# Patient Record
Sex: FEMALE | Race: BLACK OR AFRICAN AMERICAN | Employment: OTHER | URBAN - NONMETROPOLITAN AREA
[De-identification: names, ages, dates, MRNs, and addresses within clinical notes are randomized per-mention and may not be internally consistent; named-entity substitution may affect disease eponyms.]

---

## 2017-09-07 ENCOUNTER — HOSPITAL ENCOUNTER (OUTPATIENT)
Dept: CT IMAGING | Age: 52
Discharge: HOME | End: 2017-09-07
Payer: MEDICAID

## 2017-09-07 DIAGNOSIS — R51.9 HEADACHE, UNSPECIFIED HEADACHE TYPE: ICD-10-CM
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
## 2017-09-07 PROCEDURE — 70450 CT HEAD/BRAIN W/O DYE: CPT

## 2018-01-01 ENCOUNTER — APPOINTMENT (OUTPATIENT)
Dept: CT IMAGING | Age: 53
DRG: 040 | End: 2018-01-01
Payer: MEDICAID

## 2018-01-01 ENCOUNTER — APPOINTMENT (OUTPATIENT)
Dept: GENERAL RADIOLOGY | Age: 53
DRG: 040 | End: 2018-01-01
Payer: MEDICAID

## 2018-01-01 ENCOUNTER — HOSPITAL ENCOUNTER (INPATIENT)
Age: 53
LOS: 4 days | Discharge: NURSING, REHAB OR LONG-TERM CARE | DRG: 040 | End: 2018-01-05
Attending: EMERGENCY MEDICINE | Admitting: HOSPITALIST
Payer: MEDICAID

## 2018-01-01 DIAGNOSIS — I63.9 CEREBROVASCULAR ACCIDENT (CVA), UNSPECIFIED MECHANISM (HCC): Primary | ICD-10-CM

## 2018-01-01 PROBLEM — I10 ESSENTIAL HYPERTENSION: Chronic | Status: ACTIVE | Noted: 2018-01-01

## 2018-01-01 LAB
ALBUMIN SERPL-MCNC: 4.1 G/DL (ref 3.5–5.2)
ALP BLD-CCNC: 67 U/L (ref 35–104)
ALT SERPL-CCNC: 22 U/L (ref 5–33)
ANION GAP SERPL CALCULATED.3IONS-SCNC: 12 MMOL/L (ref 7–19)
APTT: 25.8 SEC (ref 26–36.2)
AST SERPL-CCNC: 14 U/L (ref 5–32)
BASOPHILS ABSOLUTE: 0 K/UL (ref 0–0.2)
BASOPHILS RELATIVE PERCENT: 0.6 % (ref 0–1)
BILIRUB SERPL-MCNC: <0.2 MG/DL (ref 0.2–1.2)
BUN BLDV-MCNC: 13 MG/DL (ref 6–20)
CALCIUM SERPL-MCNC: 9.3 MG/DL (ref 8.6–10)
CHLORIDE BLD-SCNC: 106 MMOL/L (ref 98–111)
CO2: 26 MMOL/L (ref 22–29)
CREAT SERPL-MCNC: 0.8 MG/DL (ref 0.5–0.9)
EOSINOPHILS ABSOLUTE: 0.3 K/UL (ref 0–0.6)
EOSINOPHILS RELATIVE PERCENT: 4 % (ref 0–5)
GFR NON-AFRICAN AMERICAN: >60
GLUCOSE BLD-MCNC: 122 MG/DL (ref 74–109)
HCT VFR BLD CALC: 41.6 % (ref 37–47)
HEMOGLOBIN: 13.6 G/DL (ref 12–16)
INR BLD: 1.03 (ref 0.88–1.18)
LYMPHOCYTES ABSOLUTE: 2 K/UL (ref 1.1–4.5)
LYMPHOCYTES RELATIVE PERCENT: 28.2 % (ref 20–40)
MCH RBC QN AUTO: 32.8 PG (ref 27–31)
MCHC RBC AUTO-ENTMCNC: 32.7 G/DL (ref 33–37)
MCV RBC AUTO: 100.2 FL (ref 81–99)
MONOCYTES ABSOLUTE: 0.6 K/UL (ref 0–0.9)
MONOCYTES RELATIVE PERCENT: 8.5 % (ref 0–10)
NEUTROPHILS ABSOLUTE: 4.1 K/UL (ref 1.5–7.5)
NEUTROPHILS RELATIVE PERCENT: 58.4 % (ref 50–65)
PDW BLD-RTO: 14.2 % (ref 11.5–14.5)
PLATELET # BLD: 239 K/UL (ref 130–400)
PMV BLD AUTO: 10.2 FL (ref 9.4–12.3)
POTASSIUM SERPL-SCNC: 3.7 MMOL/L (ref 3.5–5)
PROTHROMBIN TIME: 13.4 SEC (ref 12–14.6)
RBC # BLD: 4.15 M/UL (ref 4.2–5.4)
SODIUM BLD-SCNC: 144 MMOL/L (ref 136–145)
TOTAL PROTEIN: 7.1 G/DL (ref 6.6–8.7)
WBC # BLD: 6.9 K/UL (ref 4.8–10.8)

## 2018-01-01 PROCEDURE — 6360000002 HC RX W HCPCS: Performed by: PHYSICIAN ASSISTANT

## 2018-01-01 PROCEDURE — 99222 1ST HOSP IP/OBS MODERATE 55: CPT | Performed by: PHYSICIAN ASSISTANT

## 2018-01-01 PROCEDURE — 70450 CT HEAD/BRAIN W/O DYE: CPT

## 2018-01-01 PROCEDURE — 85025 COMPLETE CBC W/AUTO DIFF WBC: CPT

## 2018-01-01 PROCEDURE — 36415 COLL VENOUS BLD VENIPUNCTURE: CPT

## 2018-01-01 PROCEDURE — 93005 ELECTROCARDIOGRAM TRACING: CPT

## 2018-01-01 PROCEDURE — 80053 COMPREHEN METABOLIC PANEL: CPT

## 2018-01-01 PROCEDURE — 85610 PROTHROMBIN TIME: CPT

## 2018-01-01 PROCEDURE — 6370000000 HC RX 637 (ALT 250 FOR IP): Performed by: PHYSICIAN ASSISTANT

## 2018-01-01 PROCEDURE — 1210000000 HC MED SURG R&B

## 2018-01-01 PROCEDURE — 2580000003 HC RX 258: Performed by: PHYSICIAN ASSISTANT

## 2018-01-01 PROCEDURE — 99285 EMERGENCY DEPT VISIT HI MDM: CPT

## 2018-01-01 PROCEDURE — 71045 X-RAY EXAM CHEST 1 VIEW: CPT

## 2018-01-01 PROCEDURE — 99285 EMERGENCY DEPT VISIT HI MDM: CPT | Performed by: EMERGENCY MEDICINE

## 2018-01-01 PROCEDURE — 85730 THROMBOPLASTIN TIME PARTIAL: CPT

## 2018-01-01 RX ORDER — GABAPENTIN 300 MG/1
300 CAPSULE ORAL 3 TIMES DAILY
Status: DISCONTINUED | OUTPATIENT
Start: 2018-01-01 | End: 2018-01-05 | Stop reason: HOSPADM

## 2018-01-01 RX ORDER — ONDANSETRON 2 MG/ML
4 INJECTION INTRAMUSCULAR; INTRAVENOUS EVERY 6 HOURS PRN
Status: DISCONTINUED | OUTPATIENT
Start: 2018-01-01 | End: 2018-01-05 | Stop reason: HOSPADM

## 2018-01-01 RX ORDER — SODIUM CHLORIDE 0.9 % (FLUSH) 0.9 %
10 SYRINGE (ML) INJECTION PRN
Status: DISCONTINUED | OUTPATIENT
Start: 2018-01-01 | End: 2018-01-04 | Stop reason: SDUPTHER

## 2018-01-01 RX ORDER — LISINOPRIL AND HYDROCHLOROTHIAZIDE 20; 12.5 MG/1; MG/1
1 TABLET ORAL DAILY
Status: ON HOLD | COMMUNITY
End: 2018-01-29 | Stop reason: HOSPADM

## 2018-01-01 RX ORDER — FLUOXETINE HYDROCHLORIDE 20 MG/1
20 CAPSULE ORAL DAILY
Status: DISCONTINUED | OUTPATIENT
Start: 2018-01-01 | End: 2018-01-05 | Stop reason: HOSPADM

## 2018-01-01 RX ORDER — SODIUM CHLORIDE 0.9 % (FLUSH) 0.9 %
10 SYRINGE (ML) INJECTION EVERY 12 HOURS SCHEDULED
Status: DISCONTINUED | OUTPATIENT
Start: 2018-01-01 | End: 2018-01-04 | Stop reason: SDUPTHER

## 2018-01-01 RX ORDER — GABAPENTIN 300 MG/1
300 CAPSULE ORAL 3 TIMES DAILY
Status: ON HOLD | COMMUNITY
End: 2018-01-29 | Stop reason: HOSPADM

## 2018-01-01 RX ORDER — HYDROXYZINE PAMOATE 25 MG/1
25 CAPSULE ORAL 3 TIMES DAILY PRN
Status: DISCONTINUED | OUTPATIENT
Start: 2018-01-01 | End: 2018-01-05 | Stop reason: HOSPADM

## 2018-01-01 RX ORDER — HYDROXYZINE PAMOATE 25 MG/1
25 CAPSULE ORAL 3 TIMES DAILY PRN
Status: ON HOLD | COMMUNITY
End: 2019-07-05 | Stop reason: HOSPADM

## 2018-01-01 RX ORDER — FLUTICASONE PROPIONATE 50 MCG
1 SPRAY, SUSPENSION (ML) NASAL DAILY
Status: DISCONTINUED | OUTPATIENT
Start: 2018-01-01 | End: 2018-01-05 | Stop reason: HOSPADM

## 2018-01-01 RX ORDER — LEVOFLOXACIN 500 MG/1
500 TABLET, FILM COATED ORAL DAILY
COMMUNITY
End: 2018-01-01

## 2018-01-01 RX ORDER — ACETAMINOPHEN 325 MG/1
650 TABLET ORAL EVERY 4 HOURS PRN
Status: DISCONTINUED | OUTPATIENT
Start: 2018-01-01 | End: 2018-01-05 | Stop reason: HOSPADM

## 2018-01-01 RX ORDER — TERBINAFINE HYDROCHLORIDE 250 MG/1
250 TABLET ORAL DAILY
Status: ON HOLD | COMMUNITY
End: 2018-01-29 | Stop reason: HOSPADM

## 2018-01-01 RX ORDER — OLANZAPINE 2.5 MG/1
5 TABLET ORAL NIGHTLY
Status: DISCONTINUED | OUTPATIENT
Start: 2018-01-01 | End: 2018-01-05 | Stop reason: HOSPADM

## 2018-01-01 RX ADMIN — ENOXAPARIN SODIUM 40 MG: 40 INJECTION SUBCUTANEOUS at 21:47

## 2018-01-01 RX ADMIN — Medication 10 ML: at 21:48

## 2018-01-01 RX ADMIN — OLANZAPINE 5 MG: 2.5 TABLET, FILM COATED ORAL at 21:48

## 2018-01-01 RX ADMIN — FLUOXETINE HYDROCHLORIDE 20 MG: 20 CAPSULE ORAL at 21:50

## 2018-01-01 RX ADMIN — GABAPENTIN 300 MG: 300 CAPSULE ORAL at 21:48

## 2018-01-01 ASSESSMENT — ENCOUNTER SYMPTOMS
VISUAL CHANGE: 0
VOMITING: 0
SHORTNESS OF BREATH: 0

## 2018-01-01 NOTE — ED PROVIDER NOTES
images are visualized and preliminarily interpreted by the emergency physician with the below findings:        Interpretation per the Radiologist below, if available at the time of this note:    CT Head WO Contrast   Final Result   1. Right frontal lobe hypodensities are most consistent with   nonhemorrhagic subacute ischemia of the right middle cerebral artery   distribution. 2. Chronic bilateral basal ganglia and cerebellar calcifications. .    Signed by Dr Tal Lasron on 1/1/2018 4:42 PM      XR CHEST PORTABLE   Final Result   Impression:   1. Diminished level of inspiration with no acute process identified. .   Signed by Dr Tal Larson on 1/1/2018 4:33 PM            ED BEDSIDE ULTRASOUND:   Performed by ED Physician - none    LABS:  Labs Reviewed   CBC WITH AUTO DIFFERENTIAL - Abnormal; Notable for the following:        Result Value    RBC 4.15 (*)     .2 (*)     MCH 32.8 (*)     MCHC 32.7 (*)     All other components within normal limits   COMPREHENSIVE METABOLIC PANEL - Abnormal; Notable for the following:     Glucose 122 (*)     All other components within normal limits   APTT - Abnormal; Notable for the following:     aPTT 25.8 (*)     All other components within normal limits   PROTIME-INR       All other labs were within normal range or not returned as of this dictation. EMERGENCY DEPARTMENT COURSE and DIFFERENTIAL DIAGNOSIS/MDM:   Vitals:    Vitals:    01/01/18 1502 01/01/18 1613   BP: (!) 142/92 (!) 132/93   Pulse: 88 80   Resp: 18 21   SpO2: 98% 95%   Weight: 173 lb (78.5 kg)    Height: 5' 2\" (1.575 m)            MDM  Number of Diagnoses or Management Options  Diagnosis management comments: Discussed with Dr. Parul Lima - admit      CRITICAL CARE TIME   Total Critical Care time was 0 minutes, excluding separately reportable procedures. There was a high probability of clinically significant/life threatening deterioration in the patient's condition which required my urgent intervention.

## 2018-01-02 ENCOUNTER — APPOINTMENT (OUTPATIENT)
Dept: MRI IMAGING | Age: 53
DRG: 040 | End: 2018-01-02
Payer: MEDICAID

## 2018-01-02 PROBLEM — R27.0 ATAXIA: Status: ACTIVE | Noted: 2018-01-02

## 2018-01-02 PROBLEM — R20.0 NUMBNESS: Status: ACTIVE | Noted: 2018-01-02

## 2018-01-02 PROBLEM — R26.9 GAIT ABNORMALITY: Status: ACTIVE | Noted: 2018-01-02

## 2018-01-02 PROBLEM — R47.1 DYSARTHRIA: Status: ACTIVE | Noted: 2018-01-02

## 2018-01-02 LAB
ANION GAP SERPL CALCULATED.3IONS-SCNC: 13 MMOL/L (ref 7–19)
BUN BLDV-MCNC: 14 MG/DL (ref 6–20)
CALCIUM SERPL-MCNC: 9.1 MG/DL (ref 8.6–10)
CHLORIDE BLD-SCNC: 106 MMOL/L (ref 98–111)
CHOLESTEROL, TOTAL: 191 MG/DL (ref 160–199)
CO2: 25 MMOL/L (ref 22–29)
CREAT SERPL-MCNC: 0.7 MG/DL (ref 0.5–0.9)
GFR NON-AFRICAN AMERICAN: >60
GLUCOSE BLD-MCNC: 93 MG/DL (ref 74–109)
HBA1C MFR BLD: 5.9 %
HCT VFR BLD CALC: 39.7 % (ref 37–47)
HDLC SERPL-MCNC: 34 MG/DL (ref 65–121)
HEMOGLOBIN: 13.1 G/DL (ref 12–16)
LDL CHOLESTEROL CALCULATED: 129 MG/DL
LV EF: 55 %
LVEF MODALITY: NORMAL
MCH RBC QN AUTO: 32.5 PG (ref 27–31)
MCHC RBC AUTO-ENTMCNC: 33 G/DL (ref 33–37)
MCV RBC AUTO: 98.5 FL (ref 81–99)
PDW BLD-RTO: 14 % (ref 11.5–14.5)
PLATELET # BLD: 225 K/UL (ref 130–400)
PMV BLD AUTO: 9.8 FL (ref 9.4–12.3)
POTASSIUM SERPL-SCNC: 3.7 MMOL/L (ref 3.5–5)
RBC # BLD: 4.03 M/UL (ref 4.2–5.4)
SEDIMENTATION RATE, ERYTHROCYTE: 18 MM/HR (ref 0–25)
SODIUM BLD-SCNC: 144 MMOL/L (ref 136–145)
TRIGL SERPL-MCNC: 141 MG/DL (ref 0–149)
TSH SERPL DL<=0.05 MIU/L-ACNC: 1.1 UIU/ML (ref 0.27–4.2)
WBC # BLD: 6.6 K/UL (ref 4.8–10.8)

## 2018-01-02 PROCEDURE — 83090 ASSAY OF HOMOCYSTEINE: CPT

## 2018-01-02 PROCEDURE — 85300 ANTITHROMBIN III ACTIVITY: CPT

## 2018-01-02 PROCEDURE — 80048 BASIC METABOLIC PNL TOTAL CA: CPT

## 2018-01-02 PROCEDURE — 83921 ORGANIC ACID SINGLE QUANT: CPT

## 2018-01-02 PROCEDURE — 93880 EXTRACRANIAL BILAT STUDY: CPT

## 2018-01-02 PROCEDURE — 83516 IMMUNOASSAY NONANTIBODY: CPT

## 2018-01-02 PROCEDURE — A9577 INJ MULTIHANCE: HCPCS | Performed by: PSYCHIATRY & NEUROLOGY

## 2018-01-02 PROCEDURE — 99223 1ST HOSP IP/OBS HIGH 75: CPT | Performed by: PSYCHIATRY & NEUROLOGY

## 2018-01-02 PROCEDURE — 81241 F5 GENE: CPT

## 2018-01-02 PROCEDURE — 92610 EVALUATE SWALLOWING FUNCTION: CPT

## 2018-01-02 PROCEDURE — 2580000003 HC RX 258: Performed by: PHYSICIAN ASSISTANT

## 2018-01-02 PROCEDURE — 6360000002 HC RX W HCPCS: Performed by: PHYSICIAN ASSISTANT

## 2018-01-02 PROCEDURE — 86255 FLUORESCENT ANTIBODY SCREEN: CPT

## 2018-01-02 PROCEDURE — G8987 SELF CARE CURRENT STATUS: HCPCS

## 2018-01-02 PROCEDURE — 84443 ASSAY THYROID STIM HORMONE: CPT

## 2018-01-02 PROCEDURE — 70544 MR ANGIOGRAPHY HEAD W/O DYE: CPT

## 2018-01-02 PROCEDURE — 86308 HETEROPHILE ANTIBODY SCREEN: CPT

## 2018-01-02 PROCEDURE — 97162 PT EVAL MOD COMPLEX 30 MIN: CPT

## 2018-01-02 PROCEDURE — 36415 COLL VENOUS BLD VENIPUNCTURE: CPT

## 2018-01-02 PROCEDURE — G8988 SELF CARE GOAL STATUS: HCPCS

## 2018-01-02 PROCEDURE — 93306 TTE W/DOPPLER COMPLETE: CPT

## 2018-01-02 PROCEDURE — G8997 SWALLOW GOAL STATUS: HCPCS

## 2018-01-02 PROCEDURE — 80061 LIPID PANEL: CPT

## 2018-01-02 PROCEDURE — G8978 MOBILITY CURRENT STATUS: HCPCS

## 2018-01-02 PROCEDURE — 82390 ASSAY OF CERULOPLASMIN: CPT

## 2018-01-02 PROCEDURE — G8996 SWALLOW CURRENT STATUS: HCPCS

## 2018-01-02 PROCEDURE — 85027 COMPLETE CBC AUTOMATED: CPT

## 2018-01-02 PROCEDURE — 70553 MRI BRAIN STEM W/O & W/DYE: CPT

## 2018-01-02 PROCEDURE — 85306 CLOT INHIBIT PROT S FREE: CPT

## 2018-01-02 PROCEDURE — G8979 MOBILITY GOAL STATUS: HCPCS

## 2018-01-02 PROCEDURE — 99232 SBSQ HOSP IP/OBS MODERATE 35: CPT | Performed by: INTERNAL MEDICINE

## 2018-01-02 PROCEDURE — 1210000000 HC MED SURG R&B

## 2018-01-02 PROCEDURE — 6360000004 HC RX CONTRAST MEDICATION: Performed by: PSYCHIATRY & NEUROLOGY

## 2018-01-02 PROCEDURE — 6370000000 HC RX 637 (ALT 250 FOR IP): Performed by: PHYSICIAN ASSISTANT

## 2018-01-02 PROCEDURE — 85652 RBC SED RATE AUTOMATED: CPT

## 2018-01-02 PROCEDURE — 6370000000 HC RX 637 (ALT 250 FOR IP): Performed by: PSYCHIATRY & NEUROLOGY

## 2018-01-02 PROCEDURE — 83036 HEMOGLOBIN GLYCOSYLATED A1C: CPT

## 2018-01-02 PROCEDURE — 97165 OT EVAL LOW COMPLEX 30 MIN: CPT

## 2018-01-02 PROCEDURE — 85301 ANTITHROMBIN III ANTIGEN: CPT

## 2018-01-02 PROCEDURE — 85303 CLOT INHIBIT PROT C ACTIVITY: CPT

## 2018-01-02 RX ORDER — PRAVASTATIN SODIUM 20 MG
40 TABLET ORAL NIGHTLY
Status: DISCONTINUED | OUTPATIENT
Start: 2018-01-02 | End: 2018-01-05 | Stop reason: HOSPADM

## 2018-01-02 RX ORDER — ASPIRIN 81 MG/1
81 TABLET ORAL DAILY
Status: DISCONTINUED | OUTPATIENT
Start: 2018-01-02 | End: 2018-01-05 | Stop reason: HOSPADM

## 2018-01-02 RX ADMIN — GADOBENATE DIMEGLUMINE 17 ML: 529 INJECTION, SOLUTION INTRAVENOUS at 10:13

## 2018-01-02 RX ADMIN — GABAPENTIN 300 MG: 300 CAPSULE ORAL at 12:10

## 2018-01-02 RX ADMIN — FLUOXETINE HYDROCHLORIDE 20 MG: 20 CAPSULE ORAL at 12:09

## 2018-01-02 RX ADMIN — ENOXAPARIN SODIUM 40 MG: 40 INJECTION SUBCUTANEOUS at 20:50

## 2018-01-02 RX ADMIN — Medication 81 MG: at 12:10

## 2018-01-02 RX ADMIN — Medication 10 ML: at 12:15

## 2018-01-02 RX ADMIN — GABAPENTIN 300 MG: 300 CAPSULE ORAL at 20:53

## 2018-01-02 RX ADMIN — Medication 10 ML: at 20:53

## 2018-01-02 RX ADMIN — FLUTICASONE PROPIONATE 1 SPRAY: 50 SPRAY, METERED NASAL at 12:10

## 2018-01-02 RX ADMIN — PRAVASTATIN SODIUM 40 MG: 20 TABLET ORAL at 20:50

## 2018-01-02 RX ADMIN — OLANZAPINE 5 MG: 2.5 TABLET, FILM COATED ORAL at 20:50

## 2018-01-02 NOTE — PROGRESS NOTES
Speech Language Pathology  Facility/Department: [address] EVALUATION    NAME: Krystin Patricia  : 1965  MRN: [id number]    Date of Eval: 2018  Evaluating Therapist: Ambar Power    Current Diet level:  Current Liquid Diet : NPO    Reason for Referral  Krystin Patricia was referred for a bedside swallow evaluation to assess the efficiency of her swallow function, identify signs and symptoms of aspiration and make recommendations regarding safe dietary consistencies, effective compensatory strategies, and safe eating environment. Impression  Assessed patient's swallowing function. Patient exhibits decreased oral prep and transit of more solid consistencies, fast oral transit and suspected swallows delay with even thickened liquids (S/S penetration/aspiration were noted with nectar thick H2O trials with inconsistent changes in vocal quality and inconsistent delayed coughs observed), and very sluggish, mild-moderately decreased laryngeal elevation for swallow airway protection. At this time, would recommend mechanical soft consistency and honey thick liquids. Meds crushed in puree. Assist during meals. CHECK FOR POCKETING ON THE LEFT. Okay for ice chips IN BETWEEN MEALS for comfort. Treatment Plan  Requires SLP Intervention: Yes    Recommended Diet and Intervention  Diet Solids Recommendation: Dysphagia II Mechanically Altered  Liquid Consistency Recommendation: Honey  Recommended Form of Meds: Crushed in puree as able  Therapeutic Interventions: Patient/Family education, Diet tolerance monitoring, Oral motor exercises, Pharyngeal exercises, Therapeutic PO trials with SLP    Compensatory Swallowing Strategies  Compensatory Swallowing Strategies: Upright as possible for all oral intake;Assist feed;Small bites/sips;Eat/Feed slowly; Alternate solids and liquids; Check for pocketing of food on the Left; External pacing;Remain upright for 30-45 minutes after noted.)    Assessed patient's swallowing function with the following observation noted:    Oral Phase Dysfunction  Oral Phase: Exceptions  Oral Phase Dysfunction  Impaired Mastication: Ice chips;Mechanical soft (Patient exhibited slow oral prep with decreased vertical and rotary jaw movement noted, particularly on the left side of the mouth, during 1/2 teaspoon ice chip trials and mechanical soft consistency trials (with added gravy texture) presented by SLP.)  Decreased Anterior to Posterior Transit: Mechanical soft  Suspected Premature Bolus Loss: Ice chips;Honey - cup;Nectar - cup (Patient exhibited fast oral transit of masticated ice chip trials. Patient exhibited inconsistently fast oral transit of honey thick H2O trials presented independently via cup. Patient exhibited consistently fast oral transit of nectar thick H2O trials presented independently via cup.)  Lingual/Palatal Residue: Mechanical soft (Min-moderate oral cavity residue was noted, particularly in the left sulci, post swallows that cleared with lingual sweep and additional swallows.)  Oral Phase - Comment: Patient exhibited functional oral prep and transit of puree consistency trials, presented by SLP, with timing of oral transit primarily measuring 1-2 seconds in length and with no oral cavity residue noted post swallows.       Indicators of Pharyngeal Phase Dysfunction  Pharyngeal Phase: Exceptions  Indicators of Pharyngeal Phase Dysfunction  Delayed Swallow: Ice chips;Honey - cup;Nectar - cup (Suspect secondary to fast oral transit times.)  Decreased Laryngeal Elevation: All (Laryngeal elevation was considered to be very sluggish and mild-moderately decreased for swallow airway protection.)  Wet Vocal Quality: Nectar - cup (Patient exhibited inconsistent wet vocal quality and inconsistent delayed coughs following nectar thick H2O trials.)  Cough - Delayed: Nectar - cup (Patient exhibited inconsistent, delayed coughs following nectar thick H2O

## 2018-01-02 NOTE — PROGRESS NOTES
Patient alert and oriented x 3, up x 2 to bsc to void, left facial droop noted with left side weakness and right side numbness, will cont to monitor thru night  at bedside   Electronically signed by Veronica Pandey RN on 1/1/2018 at 11:11 PM

## 2018-01-02 NOTE — PROGRESS NOTES
Progress Note  1/2/2018 5:17 PM  Subjective:   Admit Date: 1/1/2018  PCP: JOHN Cordova      Subjective: pt seen and examined. States she is feeling better. Denies Kline, dizziness, n/v. No chest pain or sob. Still some L sided weakness but says its better. ROS: 14 point review of systems is negative except as specifically addressed above.     DIET DYSPHAGIA II MECHANICALLY ALTERED; Honey Thick    Intake/Output Summary (Last 24 hours) at 01/02/18 1717  Last data filed at 01/02/18 1100   Gross per 24 hour   Intake              150 ml   Output              300 ml   Net             -150 ml     Medications:   Current Facility-Administered Medications   Medication Dose Route Frequency Provider Last Rate Last Dose    aspirin EC tablet 81 mg  81 mg Oral Daily Kathrine Dong MD   81 mg at 01/02/18 1210    pravastatin (PRAVACHOL) tablet 40 mg  40 mg Oral Nightly Kathrine Dong MD        FLUoxetine (PROZAC) capsule 20 mg  20 mg Oral Daily Jigna Toro PA-C   20 mg at 01/02/18 1209    fluticasone (FLONASE) 50 MCG/ACT nasal spray 1 spray  1 spray Nasal Daily Jigna Toro PA-C   1 spray at 01/02/18 1210    gabapentin (NEURONTIN) capsule 300 mg  300 mg Oral TID Jigna Toro PA-C   300 mg at 01/02/18 1210    hydrOXYzine (VISTARIL) capsule 25 mg  25 mg Oral TID PRN Jigna Toro PA-C        OLANZapine (ZYPREXA) tablet 5 mg  5 mg Oral Nightly Jigna Toro PA-C   5 mg at 01/01/18 2148    sodium chloride flush 0.9 % injection 10 mL  10 mL Intravenous 2 times per day Jigna Toro PA-C   10 mL at 01/02/18 1215    sodium chloride flush 0.9 % injection 10 mL  10 mL Intravenous PRN Jigna Toro PA-C        acetaminophen (TYLENOL) tablet 650 mg  650 mg Oral Q4H PRN Jigna Toro PA-C        magnesium hydroxide (MILK OF MAGNESIA) 400 MG/5ML suspension 30 mL  30 mL Oral Daily PRN KEMAR Kim-KIM        [facility]etron [facility]) injection 4 mg  4 mg Intravenous Q6H PRN Jigna Toro PA-C  enoxaparin (LOVENOX) injection 40 mg  40 mg Subcutaneous Nightly Estee Peterson PA-C   40 mg at 01/01/18 2147    influenza quadrivalent split vaccine (FLUZONE;FLUARIX;FLULAVAL;AFLURIA) injection 0.5 mL  0.5 mL Intramuscular Once Jayla Bell MD            Labs:     Recent Labs      01/01/18   1524  01/02/18   0225   WBC  6.9  6.6   RBC  4.15*  4.03*   HGB  13.6  13.1   HCT  41.6  39.7   MCV  100.2*  98.5   MCH  32.8*  32.5*   MCHC  32.7*  33.0   PLT  239  225     Recent Labs      01/01/18   1524  01/02/18   0225   NA  144  144   K  3.7  3.7   ANIONGAP  12  13   CL  106  106   CO2  26  25   BUN  13  14   CREATININE  0.8  0.7   GLUCOSE  122*  93   CALCIUM  9.3  9.1     No results for input(s): MG, PHOS in the last 72 hours. Recent Labs      01/01/18   1524   AST  14   ALT  22   BILITOT  <0.2   ALKPHOS  67     ABGs:No results for input(s): PHART, LGN8XLQ, PO2ART, AGX8VZR, BEART, HGBAE, F6AFRFWJ, CARBOXHGBART, 02THERAPY in the last 72 hours. HgBA1c:   Recent Labs      01/02/18 0225   LABA1C  5.9     FLP:  Lab Results   Component Value Date    TRIG 141 01/02/2018    HDL 34 01/02/2018    LDLCALC 129 01/02/2018     TSH:    Lab Results   Component Value Date    TSH 1.100 01/02/2018     Troponin T: No results for input(s): TROPONINI in the last 72 hours.   INR:   Recent Labs      01/01/18   1524   INR  1.03       Objective:   Vitals: /78   Pulse 85   Temp 99.1 °F (37.3 °C) (Temporal)   Resp 16   Ht 5' 2\" (1.575 m)   Wt 179 lb (81.2 kg)   SpO2 94%   BMI 32.74 kg/m²   24HR INTAKE/OUTPUT:    Intake/Output Summary (Last 24 hours) at 01/02/18 1717  Last data filed at 01/02/18 1100   Gross per 24 hour   Intake              150 ml   Output              300 ml   Net             -150 ml     General appearance: NAD, alert and cooperative with exam  HEENT: atraumatic, PERRLA, EOMI, anicteric, trachea midline  Lungs: no increased work of breathing, CTAB, no wheezing/rhonchi/rales  Heart: RRR, +s1/s2, no

## 2018-01-02 NOTE — PROGRESS NOTES
Physical Therapy    Facility/Department: [facility] SURG SERVICES  Initial Assessment    NAME: Lisa Del Castillo  : 1965  MRN: [id number]    Date of Service: 2018    Patient Diagnosis(es): The encounter diagnosis was Cerebrovascular accident (CVA), unspecified mechanism ([facility] [address] has a past medical history of Anxiety; Depression; and Hypertension. has a past surgical history that includes  section; Hand surgery; Cholecystectomy; and Cosmetic surgery. Restrictions     Vision/Hearing  Vision:  (Pt had no c/o)  Hearing: Within functional limits     Subjective  General  Family / Caregiver Present: Yes  Diagnosis: CVA  Follows Commands: Within Functional Limits  Subjective  Subjective: Pt'S [address] [facility] [place] [id number] [facility] ASSIST FOR ALL MOBILITY  Pain Screening  Patient Currently in Pain: Yes          Orientation  Orientation  Overall Orientation Status: Within Functional Limits (thought it was december but states year correctly)    Social/Functional History  Social/Functional History  Lives With: Spouse  Home Equipment: Rolling walker  ADL Assistance: Needs assistance  Ambulation Assistance: Needs assistance  Transfer Assistance: Needs assistance  Additional Comments: requires assist with all adl's and mobility in the last month.  chart notes pt is homeless  Objective     Observation/Palpation  Posture: Fair    AROM RLE (degrees)  RLE AROM: WFL  AROM LLE (degrees)  LLE AROM : WFL  Strength RLE  Comment: hip 3/5, knee 4/5, ankle 4/5  Strength LLE  Comment: hip 2/5, knee 3/5, ankle -3/5  Motor Control  Gross Motor?:  (dec doordination L LE)  Sensation  Overall Sensation Status: WFL  Bed mobility  Supine to Sit: Contact guard assistance  Comment: cga to become upright but then required assist due to LOB once sitting  Transfers  Sit to Stand: Contact guard assistance  Stand to sit: Contact guard assistance  Bed to Chair: Minimal assistance (1+1)  Ambulation  Ambulation?: 40 percent impaired, limited or restricted  OutComes Score                                           AM-PAC Score             Goals  Short term goals  Time Frame for Short term goals: 14 days before re eval  Short term goal 1: SUP<>SIT SBA  Short term goal 2: SIT<>STAND SBA  Short term goal 3: AMB 25 FT WITH RW AND SBA       Therapy Time   Individual Concurrent Group Co-treatment   Time In           Time Out           Minutes                   Ravindra Miles PT    Electronically signed by Ravindra Miles PT on 1/2/2018 at 11:13 AM

## 2018-01-02 NOTE — CARE COORDINATION
The [address] at [facility]  Notification of Admission Decision      [] Patient has been accepted for admit to [facility] on :       Please write discharge orders and summary prior to discharge. [] Patient acceptance to Rehab pending the following :    [] Eval in progress       [x] Patient determined to be ineligible for services at [facility] because :  Feel patient will need a longer stay than Rehab to be able to return to [facility]. We recommend you consider: SNF        Thank you for your referral, we appreciate you.     Electronically Signed by Robert Ybarra Admissions Coordinator 1/2/2018 1:31 PM

## 2018-01-02 NOTE — CONSULTS
Inpatient consult to Neurology  Consult performed by: Eduard Florian ordered by: Maribell Estrada Neurology Consult        Patient:   Jose J Christianson  MR#:    [id number]  Account Number:                   [id number]      Room:    [address] [place] [address]   YOB: 1965  Date of Progress Note: 1/2/2018  Time of Note                           7:23 AM  Attending Physician:  Ruben Link MD  Consulting Physician:   Sabra Hall M.D.        [address] [place] [address] Avenue:  Left facial droop/right sided numbness/dysarthria/ataxia       HISTORY OF PRESENT ILLNESS:   This 50 y.o. female who presents with left-sided weakness, right-sided numbness, imbalance, dizziness, dysarthria that started about a week ago. Patient denies any previous stroke but she was a smoker. Currently she is homeless with her . Approximately a week ago she noted left-sided weakness and facial drooping. She had some right-sided numbness. A few weeks prior to admission she lost control of the right side. She indicates her Balance is affected. She indicates that she has had issues with walking over the last 1 year. REVIEW OF SYSTEMS:  Constitutional - No fever or chills. No diaphoresis or significant fatigue. HENT -  No tinnitus or significant hearing loss. Eyes - no sudden vision change or eye pain  Respiratory - no significant shortness of breath or cough  Cardiovascular - no chest pain No palpitations or significant leg swelling  Gastrointestinal - no abdominal swelling or pain. Genitourinary - No difficulty urinating, dysuria  Musculoskeletal - no back pain or myalgia. Skin - no color change or rash  Neurologic - No seizures. No lateralizing weakness. Hematologic - no easy bruising or excessive bleeding. Psychiatric - no severe anxiety or nervousness. All other review of systems are negative.       Past Medical History:      Diagnosis Date    Anxiety     Depression     Hypertension        Past Surgical History:      Procedure Laterality Date     SECTION      x2    CHOLECYSTECTOMY      COSMETIC SURGERY      HAND SURGERY         Medications in Hospital:      Current Facility-Administered Medications:     FLUoxetine (PROZAC) capsule 20 mg, 20 mg, Oral, Daily, Cornel Lord PA-C, 20 mg at 18    fluticasone (FLONASE) 50 MCG/ACT nasal spray 1 spray, 1 spray, Nasal, Daily, Cornel Lord PA-C, Stopped at 18    gabapentin (NEURONTIN) capsule 300 mg, 300 mg, Oral, TID, Cornel Lord PA-C, 300 mg at 18    hydrOXYzine (VISTARIL) capsule 25 mg, 25 mg, Oral, TID PRN, Cornel Lord PA-C    OLANZapine [facility]) tablet 5 mg, 5 mg, Oral, Nightly, Cornel Lord PA-C, 5 mg at 18    sodium chloride flush 0.9 % injection 10 mL, 10 mL, Intravenous, 2 times per day, Cornel Lord PA-C, 10 mL at 18    sodium chloride flush 0.9 % injection 10 mL, 10 mL, Intravenous, PRN, Cornel Lrod PA-C    acetaminophen (TYLENOL) tablet 650 mg, 650 mg, Oral, Q4H PRN, Cornel Lord PA-C    magnesium hydroxide (MILK OF MAGNESIA) 400 MG/5ML suspension 30 mL, 30 mL, Oral, Daily PRN, Cornel Lord PA-C    ondansetron [facility]) injection 4 mg, 4 mg, Intravenous, Q6H PRN, Cornel Lord PA-C    enoxaparin (LOVENOX) injection 40 mg, 40 mg, Subcutaneous, Nightly, Cornel Lord PA-C, 40 mg at 18    influenza quadrivalent split vaccine (FLUZONE;FLUARIX;FLULAVAL;AFLURIA) injection 0.5 mL, 0.5 mL, Intramuscular, Once, Maximus Crespo MD    Allergies:  Penicillins    Social History:   TOBACCO:   reports that she has been smoking. She has been smoking about 0.50 packs per day. She has never used smokeless tobacco.  ETOH:   reports that she drinks alcohol.     Family History:       Problem Relation Age of Onset    Mental Illness Mother     Diabetes Mother     Cancer Mother     Mental Illness Father            Physical Exam:    Vitals: /74 Pulse 79   Temp 98.4 °F (36.9 °C) (Temporal)   Resp 14   Ht 5' 2\" (1.575 m)   Wt 179 lb (81.2 kg)   SpO2 92%   BMI 32.74 kg/m²     Constitutional - well developed, well nourished. Eyes - conjunctiva normal.  Pupils react to light  Ear, nose, throat -hearing intact to finger rub No scars, masses, or lesions over external nose or ears, no atrophy of tongue  Neck-symmetric, no masses noted, no jugular vein distension  Respiration- chest wall appears symmetric, good expansion,   normal effort without use of accessory muscles  Musculoskeletal - no significant wasting of muscles noted, no bony deformities  Extremities-no clubbing, cyanosis or edema  Skin - warm, dry, and intact. No rash, erythema, or pallor.   Psychiatric - mood, affect, and behavior appear normal.      Neurological exam  Awake, alert, fluent oriented x 3 appropriate affect  Attention and concentration appear appropriate  Recent and remote memory appears unremarkable  Speech with dysarthria  No clear issues with language of fund of knowledge    Cranial Nerve Exam   CN II- Visual fields grossly unremarkable  CN III, IV,VI-EOMI, No nystagmus, conjugate eye movements, no ptosis  CN V-sensation reduced over the right  CN VII-left upper and lower facial droop  CN VIII-Hearing intact to finger rub  CN IX and X- Palate elevates in midline  CN XI-good shoulder shrug  CN XII-Tongue midline with no fasciculations or fibrillations    Motor Exam  Giveaway on the right upper extremity  no cogwheeling, normal tone    Sensory Exam  Sensation reduced over the right    Reflexes   2+ biceps bilaterally  2+ brachioradialis  2+patella  2+ ankle jerks  No clonus ankles  No Gordon's sign bilateral hands    Tremors- no tremors in hands or head noted    Gait  Not tested    Coordination  Finger to nose-ataxia worse on the left        CBC:   Recent Labs      01/01/18   1524  01/02/18   0225   WBC  6.9  6.6   HGB  13.6  13.1   PLT  239  225       BMP:    Recent Labs 01/01/18   1524  01/02/18   0225   NA  144  144   K  3.7  3.7   CL  106  106   CO2  26  25   BUN  13  14   CREATININE  0.8  0.7   GLUCOSE  122*  93       Hepatic:   Recent Labs      01/01/18   1524   AST  14   ALT  22   BILITOT  <0.2   ALKPHOS  67       Lipids:   Recent Labs      01/02/18   0225   CHOL  191   HDL  34*       INR:   Recent Labs      01/01/18   1524   INR  1.03           Assessment and Plan     Left-sided weakness/right-sided numbness/ataxia/left upper and lower facial droop/dysarthria  Suspect a brainstem stroke (tiffany) with crossed findings-Demyelination is certainly in the differential  Check MRI of the brain with without contrast/MRA of the head/carotid/2-D echo  Placed on aspirin for stroke prophylaxis   -start statin  hemoglobin A1c 5.9  Smoking cessation  DVT prophylaxis-Lovenox  Mood disorder on medications  PT/OT/speech  rehab consult       Please feel free to call with any questions   [phone] (cell phone)    Dr Keely Pham certified in Neurology  Board Certified in Clinical Neurophysiology  Fellowship Trained in [place] [address] Neurophysiology

## 2018-01-02 NOTE — H&P
Yes Historical Provider, MD     Allergies:    Penicillins    Social History:    The patient currently lives at home. Tobacco:   reports that she has been smoking. She has been smoking about 0.50 packs per day. She has never used smokeless tobacco.  Alcohol:   reports that she drinks alcohol. Illicit Drugs: denies    Family History:      Problem Relation Age of Onset    Mental Illness Mother     Diabetes Mother     Cancer Mother     Mental Illness Father      Review of Systems:   Constitutional / general:  Denies fever / chills / sweats  Head: + headache / Denies neck stiffness / trauma / visual change  Eyes:  Denies blurry vision / acute visual change or loss / itching / redness  ENT: Denies sore throat / hoarseness / nasal drainage / ear pain  CV:  Denies chest pain / palpitations/ orthopnea   Respiratory:  Denies cough / shortness of breath / sputum / hemoptysis  GI: Denies nausea / vomiting / abdominal pain / diarrhea / constipation  :  Denies dysuria / hesitancy / urgency / hematuria   Neuro: Denies paralysis / syncope / seizure / dysphagia /+ headache /+ paresthesias/ +unilateral weakness  Musculoskeletal:  + muscle weakness /Denies joint stiffness / pain  Vascular: Denies edema / claudication / varicosities  Heme / endocrine: Denies easy bruising / bleeding / excessive sweating / heat or cold intolerance  Psychiatric:  + depression /Denies anxiety / insomnia / mood changes  Skin:  Denies new rashes / lesions / skin hair or nail changes    14 point review of systems is negative except as specifically addressed above. Physical Examination:  /75   Pulse 87   Temp 98 °F (36.7 °C)   Resp 15   Ht 5' 2\" (1.575 m)   Wt 173 lb (78.5 kg)   SpO2 99%   BMI 31.64 kg/m²   General appearance: alert, appears stated age, cooperative and no distress, resting comfortably in ER stretcher  Head: Normocephalic, without obvious abnormality, atraumatic  Eyes: conjunctivae/corneas clear. PERRL, EOM's intact.

## 2018-01-03 ENCOUNTER — APPOINTMENT (OUTPATIENT)
Dept: CT IMAGING | Age: 53
DRG: 040 | End: 2018-01-03
Payer: MEDICAID

## 2018-01-03 DIAGNOSIS — I63.9 CEREBROVASCULAR ACCIDENT (CVA) DETERMINED BY CLINICAL ASSESSMENT (HCC): Primary | ICD-10-CM

## 2018-01-03 DIAGNOSIS — R90.89 ABNORMAL FINDING ON MRI OF BRAIN: ICD-10-CM

## 2018-01-03 PROCEDURE — 99233 SBSQ HOSP IP/OBS HIGH 50: CPT | Performed by: INTERNAL MEDICINE

## 2018-01-03 PROCEDURE — 97530 THERAPEUTIC ACTIVITIES: CPT

## 2018-01-03 PROCEDURE — 99233 SBSQ HOSP IP/OBS HIGH 50: CPT | Performed by: PSYCHIATRY & NEUROLOGY

## 2018-01-03 PROCEDURE — 71260 CT THORAX DX C+: CPT

## 2018-01-03 PROCEDURE — 2580000003 HC RX 258: Performed by: PHYSICIAN ASSISTANT

## 2018-01-03 PROCEDURE — 1210000000 HC MED SURG R&B

## 2018-01-03 PROCEDURE — 99253 IP/OBS CNSLTJ NEW/EST LOW 45: CPT | Performed by: NEUROLOGICAL SURGERY

## 2018-01-03 PROCEDURE — 97116 GAIT TRAINING THERAPY: CPT

## 2018-01-03 PROCEDURE — 6370000000 HC RX 637 (ALT 250 FOR IP): Performed by: PHYSICIAN ASSISTANT

## 2018-01-03 PROCEDURE — 74177 CT ABD & PELVIS W/CONTRAST: CPT

## 2018-01-03 PROCEDURE — 6360000002 HC RX W HCPCS: Performed by: PHYSICIAN ASSISTANT

## 2018-01-03 PROCEDURE — 6360000004 HC RX CONTRAST MEDICATION: Performed by: HOSPITALIST

## 2018-01-03 PROCEDURE — 6370000000 HC RX 637 (ALT 250 FOR IP): Performed by: PSYCHIATRY & NEUROLOGY

## 2018-01-03 PROCEDURE — 92526 ORAL FUNCTION THERAPY: CPT

## 2018-01-03 PROCEDURE — 99255 IP/OBS CONSLTJ NEW/EST HI 80: CPT | Performed by: INTERNAL MEDICINE

## 2018-01-03 RX ADMIN — ENOXAPARIN SODIUM 40 MG: 40 INJECTION SUBCUTANEOUS at 20:58

## 2018-01-03 RX ADMIN — Medication 10 ML: at 20:59

## 2018-01-03 RX ADMIN — GABAPENTIN 300 MG: 300 CAPSULE ORAL at 18:44

## 2018-01-03 RX ADMIN — Medication 81 MG: at 18:44

## 2018-01-03 RX ADMIN — FLUOXETINE HYDROCHLORIDE 20 MG: 20 CAPSULE ORAL at 18:44

## 2018-01-03 RX ADMIN — PRAVASTATIN SODIUM 40 MG: 20 TABLET ORAL at 20:58

## 2018-01-03 RX ADMIN — FLUTICASONE PROPIONATE 1 SPRAY: 50 SPRAY, METERED NASAL at 18:27

## 2018-01-03 RX ADMIN — OLANZAPINE 5 MG: 2.5 TABLET, FILM COATED ORAL at 20:58

## 2018-01-03 RX ADMIN — GABAPENTIN 300 MG: 300 CAPSULE ORAL at 20:58

## 2018-01-03 RX ADMIN — IOPAMIDOL 90 ML: 755 INJECTION, SOLUTION INTRAVENOUS at 13:37

## 2018-01-03 NOTE — PROGRESS NOTES
Physical Therapy  Name: Jose J Christianson  MRN:  [id number]  Date of service:  1/3/2018       01/03/18 1693   Subjective   Subjective Patient agreeable to work with therapy. Pain Screening   Patient Currently in Pain Denies   Bed Mobility   Supine to Sit Stand by assistance;Contact guard assistance   Scooting Stand by assistance;Contact guard assistance   Transfers   Sit to Stand Contact guard assistance   Stand to sit Contact guard assistance   Ambulation   Ambulation? Yes   Ambulation 1   Surface level tile   Device Standard Walker   Assistance Moderate assistance  (1+1)   Quality of Gait unsteady, assist with advancing walker, consistent cues required for technique and sequencing, assist with lateral weight shifts with decreased foot clearance noted on left LE   Distance 20'   Comments increased gait distance   Short term goals   Time Frame for Short term goals 14 days before re eval   Short term goal 1 SUP<>SIT SBA   Short term goal 2 SIT<>STAND SBA   Short term goal 3 AMB 25 FT WITH RW AND SBA   Conditions Requiring Skilled Therapeutic Intervention   Body structures, Functions, Activity limitations Decreased functional mobility ; Decreased strength;Decreased safe awareness;Decreased balance;Decreased coordination   Assessment Patient did better today. Assisted patient to the recliner and left with all needs in reach.    Treatment Diagnosis CVA   Prognosis Good       Electronically signed by Faisal Wheatley PTA on 1/3/2018 at 4:23 PM

## 2018-01-03 NOTE — CONSULTS
PA-C, 5 mg at 01/02/18 2050    sodium chloride flush 0.9 % injection 10 mL, 10 mL, Intravenous, 2 times per day, Susan Pontiff, PA-C, 10 mL at 01/02/18 2053    sodium chloride flush 0.9 % injection 10 mL, 10 mL, Intravenous, PRN, Susan Pontiff, PA-C    acetaminophen (TYLENOL) tablet 650 mg, 650 mg, Oral, Q4H PRN, Susan Pontiff, PA-C    magnesium hydroxide (MILK OF MAGNESIA) 400 MG/5ML suspension 30 mL, 30 mL, Oral, Daily PRN, Susan Pontiff, PA-C    [facility]ansetron [facility]) injection 4 mg, 4 mg, Intravenous, Q6H PRN, Susan Pontiff, PA-C    enoxaparin (LOVENOX) injection 40 mg, 40 mg, Subcutaneous, Nightly, Susan Pontiff, PA-C, 40 mg at 01/02/18 2050    influenza quadrivalent split vaccine (FLUZONE;FLUARIX;FLULAVAL;AFLURIA) injection 0.5 mL, 0.5 mL, Intramuscular, Once, Elly Montero MD  Penicillins    Social History  History   Smoking Status    Current Every Day Smoker    Packs/day: 0.50   Smokeless Tobacco    Never Used     History   Alcohol Use    Yes     Comment: Occassionally         Family History   Problem Relation Age of Onset    Mental Illness Mother     Diabetes Mother     Cancer Mother     Mental Illness Father          REVIEW OF SYSTEMS:  Constitutional: No fevers No chills  Neck:No stiffness  Respiratory: No shortness of breath  Cardiovascular: No chest pain No palpitations  Gastrointestinal: No abdominal pain    Genitourinary: No Dysuria  Neurological: No headache, no confusion    PHYSICAL EXAM:  Vitals:    01/03/18 1432   BP: 114/76   Pulse: 84   Resp: 18   Temp: 97.4 °F (36.3 °C)   SpO2: 96%     Constitutional: The patient appears well-developed and well-nourished.    Eyes - conjunctiva normal.  Conjugate Gaze  Ear, nose, throat - No scars, masses, or lesions over external nose or ears, no atrophy of tongue  Neck-symmetric, no masses noted, no jugular vein distension  Respiration- chest wall appears symmetric, good expansion, normal effort without use of accessory muscles  Musculoskeletal - no significant wasting of muscles noted, no bony deformities, gait no gross ataxia  Extremities-no clubbing, cyanosis or edema  Skin - warm, dry, and intact. No rash, erythema, or pallor. Psychiatric - mood, affect, and behavior appear normal.     Neurologic Examination  Awake, Alert and oriented x 3  Left sided facial droop  Pronator drift on left  Normal speech pattern, following commands  Identifies objects appropriately   Motor 4-/5 entire LUE, subtle weakness in LLE   No deficits to light touch or pinprick sensation  Reflexes 2+ and symmetric      DATA:  Nursing/pcp notes, imaging,labs and vitals reviewed. PT,OT and/or speech notes reviewed    Lab Results   Component Value Date    WBC 6.6 01/02/2018    HGB 13.1 01/02/2018    HCT 39.7 01/02/2018    MCV 98.5 01/02/2018     01/02/2018     Lab Results   Component Value Date     01/02/2018    K 3.7 01/02/2018     01/02/2018    CO2 25 01/02/2018    BUN 14 01/02/2018    CREATININE 0.7 01/02/2018    GLUCOSE 93 01/02/2018    CALCIUM 9.1 01/02/2018    PROT 7.1 01/01/2018    LABALBU 4.1 01/01/2018    BILITOT <0.2 01/01/2018    ALKPHOS 67 01/01/2018    AST 14 01/01/2018    ALT 22 01/01/2018    LABGLOM >60 01/02/2018    GLOB 3.0 11/12/2016     Lab Results   Component Value Date    INR 1.03 01/01/2018    PROTIME 13.4 01/01/2018     Narrative   Examination. MRA HEAD WO CONTRAST   History: Stroke. MR angiography of the brain is performed with 2-D time-of-flight   imaging sequence without contrast enhancement. There is subsequent 3-D   maximum intensity projection image reconstruction. The source images   and reformatted images are reviewed. There is no previous study for   comparison. The correlation is made with the previous MR imaging of   the brain obtained earlier today. There are normal size internal carotid arteries near the base of the   skull and in the carotid canals bilaterally.  There are normal size   internal carotid arteries in the cavernous sinus and suprasellar   region bilaterally. The left internal carotid artery then divides into   normal-appearing middle and anterior cerebral arteries. There is   normal visualization of the insular and opercular branches of the left   middle cerebral artery. The right internal carotid artery divides into   normal-appearing anterior cerebral artery. There is an area of   narrowing and displacement at the junction of M1 and M2 segment of the   right middle cerebral artery. This area is poorly defined due to   motion/imaging artifacts. The insular branches of the right middle   cerebral arteries are very poorly visualized and are not displaced   laterally. Opercular branches are also poorly visualized. There are no   areas of focal aneurysmal dilatation. A normal size/dominant right vertebral artery and a small and   attenuated left vertebral arteries enter the foramen magnum and join   to make a normal size basilar artery. It divides into a   tiny/attenuated P1 segment of the left posterior cerebral artery and a   normal size right posterior cerebral artery. There is a large left   posterior communicating artery joining and attenuated P1 segment to   make a normal size P2 and P3 segment of the left posterior cerebral   artery. No areas of focal stenosis or aneurysmal dilatation.       Impression   An area of narrowing at the M1 and M2 junction of the   right middle cerebral artery with displacement of the insular and   opercular branches which are poorly visualized. The possibility of a   mass in the area is suspected. A fetal type posterior circulation.    Signed by Dr Himanshu Broderick on 1/2/2018 10:50 AM         Narrative   MRI BRAIN W WO CONTRAST 1/2/2018 6:00 AM   HISTORY: CVA size for one week   Comparison: CT scan of January 1, 2018 CT scan September 7, 2017     Technique: Multiplanar imaging of the brain was performed in a routine   fashion before and after the intravenous injection of gadolinium   contrast.   Findings:    Midline structures are nondisplaced. There is mass effect against the   frontal horn and body of the right lateral ventricle. . . Basilar   cisterns are preserved. Restricted diffusion is present in the right   centrum semiovale. Subtle blood products are present in the right   basal ganglia and head of the caudate nucleus on the right. Some   associated enhancement is present. An underlying neoplasm cannot be   excluded. . No abnormal extra axial fluid collections are noted. There is restricted diffusion in the right hemisphere adjacent to the   subtle hemorrhagic lesion. .    Proximal cervical spinal cord, brainstem, and cerebellum are   unremarkable. Normal cerebrovascular flow voids are seen. Bilateral   globes and orbits are normal in appearance. No abnormal signal is noted in the mastoid air cells or paranasal   sinuses.        Impression   Impression:    Right basal ganglia lesion is present. This is 17 x 42 mm. This   contains some blood products. There are also some subtle peripheral   enhancement. Associated restricted diffusion is noted peripherally. Although   restricted diffusion suggests ischemic infarction in the basal ganglia   this lesion may represent a neoplasm. Follow-up is suggested. 2.    fahrs disease . There is extensive calcification in the basal ganglia   region and also in the dentate nuclei region. Signed by Dr Elijah Boland on 1/2/2018 10:32 AM         IMPRESSION:  Mrs. Rafael Velasco is a 50year old female that originally presented to the ED with stroke like symptoms that had been occurring for approximately 1 week. RECOMMENDATIONS:    -We had a long discussion with Mrs. Rafael Velasco regarding the results/findings of the MRI brain.   We discussed that we believe that the findings of the MRI are consistent with an ischemic event followed by a small amount of hemorrhage that is resulting in cerebral edema, however, the possibility of

## 2018-01-03 NOTE — CONSULTS
[facility]     Cardiology Consultation      Date of Admission:  1/1/2018  2:48 PM    Date of Initially Being Seen / Consultation:  1/3/18    Cardiologist:  Dr. Olivia Briggs    Cardiology Attending: Dr. Ilan Macdonald Attending: Hospitalist     PCP:  JOHN Drew    Reason for Consultation or Admission / Chief Complaint:  Evaluation for WAQAR and Loop recorder placement    SUBJECTIVE AND HISTORY OF PRESENT ILLNESS:    Source of the history:  Patient, family, previous inpatient and outpatient records in [facility]. Sylvester Cabot is a 50 y.o. female who presents to [facility] ER with symptoms / signs / problem or diagnosis of left sided weakness, right sided numbness, balance abnormalities, facial droop, and dysarthria onset of a week ago. Patient had an abnormal MRI/MRA of the brain that is consistent with embolic process according to Dr. Fay Armstrong. Cardiology was asked to evaluate patient for need for WAQAR and loop recorder placement. Patient states that she does have occasional palpitations that she was told were due to panic attacks. They usually last for seconds to minutes and spontaneously resolve. She denied aggravating or alleviating factors. Patient denies prior history of CAD, PE, and DVT. Patient denies chest pain, pressure, and tightness. Patient does have a history of hypertension.      Family present:  no      CARDIAC RISK PROFILE:    Risk Factor Yes / No / Unknown       Gender Female   Cigarette Use Yes: Former   Family History of Cardiovascular Disease Yes: Mother   Diabetes Mellitus no   Hypercholesteremia yes   Hypertension yes          Cardiac Specific Problems:    Specialty Problems        Cardiology Problems    * (Principal)Cerebrovascular accident (CVA) determined by clinical assessment [facility])        Essential hypertension                PRIOR CARDIAC PROBLEM LIST  (IF APPLICABLE):  6/0/6262 Echo: Normal LVEF, 55%      Past Medical History:    Past Medical History:   Diagnosis Date    Anxiety     Depression     Hypertension          Past Surgical History:    Past Surgical History:   Procedure Laterality Date     SECTION      x2    CHOLECYSTECTOMY      COSMETIC SURGERY      HAND SURGERY           Home Medications:   Prior to Admission medications    Medication Sig Start Date End Date Taking? Authorizing Provider   hydrOXYzine (VISTARIL) 25 MG capsule Take 25 mg by mouth 3 times daily as needed for Itching   Yes Historical Provider, MD   terbinafine (LAMISIL) 250 MG tablet Take 250 mg by mouth daily   Yes Historical Provider, MD   lisinopril-hydrochlorothiazide (PRINZIDE;ZESTORETIC) 20-12.5 MG per tablet Take 1 tablet by mouth daily   Yes Historical Provider, MD   gabapentin (NEURONTIN) 300 MG capsule Take 300 mg by mouth 3 times daily. Yes Historical Provider, MD   FLUoxetine (PROZAC) 20 MG capsule Take 1 capsule by mouth daily 16  Yes Arthor Ashley, APRBYRON   OLANZapine (ZYPREXA) 5 MG tablet Take 1 tablet by mouth nightly 16  Yes Arthor Ashley, APRN   fluticasone (FLONASE) 50 MCG/ACT nasal spray 1 spray by Nasal route daily   Yes Historical Provider, MD        Facility Administered Medications:    aspirin  81 mg Oral Daily    pravastatin  40 mg Oral Nightly    FLUoxetine  20 mg Oral Daily    fluticasone  1 spray Nasal Daily    gabapentin  300 mg Oral TID    OLANZapine  5 mg Oral Nightly    sodium chloride flush  10 mL Intravenous 2 times per day    enoxaparin  40 mg Subcutaneous Nightly    influenza virus vaccine  0.5 mL Intramuscular Once       Allergies:  Penicillins     Social History:       Social History     Social History    Marital status:      Spouse name: N/A    Number of children: N/A    Years of education: N/A     Occupational History    Not on file.      Social History Main Topics    Smoking status: Current Every Day Smoker     Packs/day: 0.50    Smokeless tobacco: Never Used    Alcohol use distress  HEENT -  PERRLA, Hearing appears normal, conjunctiva and lids are normal, ears and nose appear normal  NECK - no thyromegaly, no JVD, trachea is in the midline  CARDIOVASCULAR - PMI is in the left mid line clavicular position, Normal S1 and S2 without systolic murmur. No S3 or S4    PULMONARY - No respiratory distress. No wheezes and rales. Breath sounds in both  lung fields are Normal  ABDOMEN  - soft, non tender, no rebound, no hepatomegaly or splenomegaly  MUSCULOSKELETAL  - Sitting, digitals and nails are without clubbing or cyanosis  EXTREMITIES - No+ edema  NEUROLOGIC - alert and oriented x3  SKIN - turgor is normal, no rash  PSYCHIATRIC - normal mood and affect, alert and orientated x 3, judgement and insight appear appropriate  I examined the patient for these specific problems:    ASSESSMENT:    ALL THE CARDIOLOGY PROBLEMS ARE LISTED ABOVE; HOWEVER, THE FOLLOWING SPECIFIC CARDIAC PROBLEMS WERE ADDRESSED AND TREATED DURING THE HOSPITAL VISIT TODAY:                                                                                                                                                                                                                                              MEDICAL DECISION MAKING                  Cardiac Specific Problem / Diagnosis   Discussion and Data Reviewed Diagnostic Procedures Ordered Management Options Selected                 1. Presenting problem / symptom     Left sided weakness, ataxia, facial droop, speech disturbance  Initial encounter MRA:   An area of narrowing at the M1 and M2 junction of the   right middle cerebral artery with displacement of the insular and   opercular branches which are poorly visualized. The possibility of a mass in the area is suspected. A fetal type posterior circulation.      MRI brain:   Right basal ganglia lesion is present. This is 17 x 42 mm. This   contains some blood products.  There are also some subtle peripheral

## 2018-01-03 NOTE — PROGRESS NOTES
Progress Note  1/3/2018 1:57 PM  Subjective:   Admit Date: 1/1/2018  PCP: JOHN Saleem      Subjective: pt seen and examined. Continues to feel improved. Has less weakness today. Denies Kline, dizziness, n/v. No chest pain or sob. ROS: 14 point review of systems is negative except as specifically addressed above.     DIET DYSPHAGIA II MECHANICALLY ALTERED; Pace Thick    Intake/Output Summary (Last 24 hours) at 01/03/18 1357  Last data filed at 01/03/18 0835   Gross per 24 hour   Intake              840 ml   Output              450 ml   Net              390 ml     Medications:   Current Facility-Administered Medications   Medication Dose Route Frequency Provider Last Rate Last Dose    aspirin EC tablet 81 mg  81 mg Oral Daily Suma Fry MD   81 mg at 01/02/18 1210    pravastatin (PRAVACHOL) tablet 40 mg  40 mg Oral Nightly Suma Fry MD   40 mg at 01/02/18 2050    FLUoxetine (PROZAC) capsule 20 mg  20 mg Oral Daily Cornel Lord PA-C   20 mg at 01/02/18 1209    fluticasone (FLONASE) 50 MCG/ACT nasal spray 1 spray  1 spray Nasal Daily Cornel Lord PA-C   1 spray at 01/02/18 1210    gabapentin (NEURONTIN) capsule 300 mg  300 mg Oral TID JESSICA AshbyC   300 mg at 01/02/18 2053    hydrOXYzine (VISTARIL) capsule 25 mg  25 mg Oral TID PRN Cornel Lord PA-C        OLANZapine (ZYPREXA) tablet 5 mg  5 mg Oral Nightly Cornel Lord PA-C   5 mg at 01/02/18 2050    sodium chloride flush 0.9 % injection 10 mL  10 mL Intravenous 2 times per day Cornel Lord PA-C   10 mL at 01/02/18 2053    sodium chloride flush 0.9 % injection 10 mL  10 mL Intravenous PRN Cornel Lord PA-C        acetaminophen (TYLENOL) tablet 650 mg  650 mg Oral Q4H PRN Cornel Lord PA-C        magnesium hydroxide (MILK OF MAGNESIA) 400 MG/5ML suspension 30 mL  30 mL Oral Daily PRN Cornel Lord PA-C        [facility]) injection 4 mg  4 mg Intravenous Q6H PRN Cornel Lord PA-C

## 2018-01-03 NOTE — CARE COORDINATION
SW met with Pt and spouse in the room to discuss DC plans. Pt and spouse have a home at [facility]. If Pt goes to rehab somewhere, then [facility]. Pt and spouse are requesting to do outpatient therapy instead. SW explained the process of getting Pt to and from appointments and both parties understood. SW gave contact card and asked to be contacted if plans/needs change. Will continue to monitor and update as needed.  Electronically signed by Yeni Houser on 1/3/2018 at 8:56 AM

## 2018-01-03 NOTE — PROGRESS NOTES
Patient:   Krystin Patricia  MR#:    [id number]   Room:    [address] [place] [address]   YOB: 1965  Date of Progress Note: 1/3/2018  Time of Note                           8:31 AM  Consulting Physician:   Hyun Hillman M.D. Attending Physician:  Olga Wyman MD     Chief complaint left sided weakness    S:This 50 y.o. female  who presents with left-sided weakness, right-sided numbness, imbalance, dizziness, dysarthria that started about a week ago. Patient denies any previous stroke but she is a smoker. Currently she is homeless with her . Approximately a week ago she noted left-sided weakness and facial drooping. She feels she had some right-sided numbness. A few weeks prior to admission she lost control of the right side. She indicates her Balance is affected. She indicates that she has had issues with walking over the last 1 year.     REVIEW OF SYSTEMS:  Constitutional: No fevers No chills  Neck:No stiffness  Respiratory: No shortness of breath  Cardiovascular: No chest pain No palpitations  Gastrointestinal: No abdominal pain    Genitourinary: No Dysuria  Neurological: No headache, no confusion    Past Medical History:      Diagnosis Date    Anxiety     Depression     Hypertension        Past Surgical History:      Procedure Laterality Date     SECTION      x2    CHOLECYSTECTOMY      COSMETIC SURGERY      HAND SURGERY         Medications in Hospital:      Current Facility-Administered Medications:     aspirin EC tablet 81 mg, 81 mg, Oral, Daily, Hyun Hillman MD, 81 mg at 18 1210    pravastatin (PRAVACHOL) tablet 40 mg, 40 mg, Oral, Nightly, Hyun Hillman MD, 40 mg at 18    FLUoxetine (PROZAC) capsule 20 mg, 20 mg, Oral, Daily, Peace [place] PA-C, 20 mg at 18 1209    fluticasone (FLONASE) 50 MCG/ACT nasal spray 1 spray, 1 spray, Nasal, Daily, Peace Morris PA-C, 1 spray at 18 1210    gabapentin (NEURONTIN) capsule 300 mg, 300 mg, Oral, TID, Olivia [place] without use of accessory muscles  Musculoskeletal - no significant wasting of muscles noted, no bony deformities Extremities-no clubbing, cyanosis or edema  Skin - warm, dry, and intact. No rash, erythema, or pallor. Psychiatric - mood, affect, and behavior appear normal.         Neurological exam  Awake, alert, fluent oriented x 3 appropriate affect  Attention and concentration appear appropriate  Recent and remote memory appears unremarkable  Speech with dysarthria  No clear issues with language of fund of knowledge     Cranial Nerve Exam     CN III, IV,VI-EOMI, No nystagmus, conjugate eye movements, no ptosis    CN VII-left upper and lower facial droop       Motor Exam  Giveaway on the left no cogwheeling, normal tone     Sensation intact right LT        Tremors- no tremors in hands or head noted     Gait  Not tested       Nursing/pcp notes, imaging,labs and vitals reviewed.      PT,OT and/or speech notes reviewed    Lab Results   Component Value Date    WBC 6.6 01/02/2018    HGB 13.1 01/02/2018    HCT 39.7 01/02/2018    MCV 98.5 01/02/2018     01/02/2018     Lab Results   Component Value Date     01/02/2018    K 3.7 01/02/2018     01/02/2018    CO2 25 01/02/2018    BUN 14 01/02/2018    CREATININE 0.7 01/02/2018    GLUCOSE 93 01/02/2018    CALCIUM 9.1 01/02/2018    PROT 7.1 01/01/2018    LABALBU 4.1 01/01/2018    BILITOT <0.2 01/01/2018    ALKPHOS 67 01/01/2018    AST 14 01/01/2018    ALT 22 01/01/2018    LABGLOM >60 01/02/2018    GLOB 3.0 11/12/2016     Lab Results   Component Value Date    INR 1.03 01/01/2018    PROTIME 13.4 01/01/2018     ECHO Complete 2D W Doppler W Color [[id number]] Collected: 01/02/18 1790   Updated: 01/02/18 1420    Narrative:     Transthoracic Echocardiography Report (TTE)     Demographics      Patient Name Larissa Aldana Date of Study          01/02/2018      MRN           [id number]           Gender                 Female      Date of Birth 1965       Room narrowing at the M1 and M2 junction of the  right middle cerebral artery with displacement of the insular and  opercular branches which are poorly visualized. The possibility of a  mass in the area is suspected. A fetal type posterior circulation. Signed by Dr Prasad Do on 1/2/2018 10:50 AM   MRI BRAIN W [address] [employer] [[id number]] Resulted: 01/02/18 1132   Updated: 01/02/18 1034    Narrative:     MRI BRAIN W WO CONTRAST 1/2/2018 6:00 AM  HISTORY: CVA size for one week  Comparison: CT scan of January 1, 2018 CT scan September 7, 2017    Technique: Multiplanar imaging of the brain was performed in a routine  fashion before and after the intravenous injection of gadolinium  contrast.  Findings:   Midline structures are nondisplaced. There is mass effect against the  frontal horn and body of the right lateral ventricle. . . Basilar  cisterns are preserved. Restricted diffusion is present in the right  centrum semiovale. Subtle blood products are present in the right  basal ganglia and head of the caudate nucleus on the right. Some  associated enhancement is present. An underlying neoplasm cannot be  excluded. . No abnormal extra axial fluid collections are noted. There is restricted diffusion in the right hemisphere adjacent to the  subtle hemorrhagic lesion. .   Proximal cervical spinal cord, brainstem, and cerebellum are  unremarkable. Normal cerebrovascular flow voids are seen. Bilateral  globes and orbits are normal in appearance. No abnormal signal is noted in the mastoid air cells or paranasal  sinuses. Impression:     Impression:   Right basal ganglia lesion is present. This is 17 x 42 mm. This  contains some blood products. There are also some subtle peripheral  enhancement. Associated restricted diffusion is noted peripherally. Although  restricted diffusion suggests ischemic infarction in the basal ganglia  this lesion may represent a neoplasm. Follow-up is suggested. 2.   fahrs disease .  There is

## 2018-01-04 PROBLEM — Z95.818 STATUS POST PLACEMENT OF IMPLANTABLE LOOP RECORDER: Status: ACTIVE | Noted: 2018-01-04

## 2018-01-04 LAB
ANA IGG, ELISA: NORMAL
ANTITHROMBIN ACTIVITY: 99 % (ref 76–128)
ANTITHROMBIN ANTIGEN: 84 % (ref 82–136)
CERULOPLASMIN: 30 MG/DL (ref 17–54)
DOUBLE STRANDED DNA AB, IGG: NORMAL
HOMOCYSTEINE: 10 UMOL/L
PROTEIN C FUNCTIONAL: 142 % (ref 83–168)
PROTEIN S, FUNCTIONAL: 85 % (ref 57–131)

## 2018-01-04 PROCEDURE — 97116 GAIT TRAINING THERAPY: CPT

## 2018-01-04 PROCEDURE — 99232 SBSQ HOSP IP/OBS MODERATE 35: CPT | Performed by: INTERNAL MEDICINE

## 2018-01-04 PROCEDURE — 97530 THERAPEUTIC ACTIVITIES: CPT

## 2018-01-04 PROCEDURE — 6360000002 HC RX W HCPCS

## 2018-01-04 PROCEDURE — 33282 HC IMPANTABLE LOOP RECORDER: CPT | Performed by: INTERNAL MEDICINE

## 2018-01-04 PROCEDURE — B246ZZ4 ULTRASONOGRAPHY OF RIGHT AND LEFT HEART, TRANSESOPHAGEAL: ICD-10-PCS | Performed by: INTERNAL MEDICINE

## 2018-01-04 PROCEDURE — 93312 ECHO TRANSESOPHAGEAL: CPT | Performed by: INTERNAL MEDICINE

## 2018-01-04 PROCEDURE — C1764 EVENT RECORDER, CARDIAC: HCPCS

## 2018-01-04 PROCEDURE — 6360000002 HC RX W HCPCS: Performed by: PHYSICIAN ASSISTANT

## 2018-01-04 PROCEDURE — 2580000003 HC RX 258: Performed by: NURSE PRACTITIONER

## 2018-01-04 PROCEDURE — G0008 ADMIN INFLUENZA VIRUS VAC: HCPCS | Performed by: HOSPITALIST

## 2018-01-04 PROCEDURE — 94762 N-INVAS EAR/PLS OXIMTRY CONT: CPT

## 2018-01-04 PROCEDURE — 1210000000 HC MED SURG R&B

## 2018-01-04 PROCEDURE — 90686 IIV4 VACC NO PRSV 0.5 ML IM: CPT | Performed by: HOSPITALIST

## 2018-01-04 PROCEDURE — 6360000002 HC RX W HCPCS: Performed by: HOSPITALIST

## 2018-01-04 PROCEDURE — 6370000000 HC RX 637 (ALT 250 FOR IP): Performed by: PSYCHIATRY & NEUROLOGY

## 2018-01-04 PROCEDURE — 93325 DOPPLER ECHO COLOR FLOW MAPG: CPT

## 2018-01-04 PROCEDURE — 93320 DOPPLER ECHO COMPLETE: CPT | Performed by: INTERNAL MEDICINE

## 2018-01-04 PROCEDURE — 0JH632Z INSERTION OF MONITORING DEVICE INTO CHEST SUBCUTANEOUS TISSUE AND FASCIA, PERCUTANEOUS APPROACH: ICD-10-PCS | Performed by: INTERNAL MEDICINE

## 2018-01-04 PROCEDURE — 2700000000 HC OXYGEN THERAPY PER DAY

## 2018-01-04 PROCEDURE — 6370000000 HC RX 637 (ALT 250 FOR IP): Performed by: PHYSICIAN ASSISTANT

## 2018-01-04 PROCEDURE — 99233 SBSQ HOSP IP/OBS HIGH 50: CPT | Performed by: PSYCHIATRY & NEUROLOGY

## 2018-01-04 PROCEDURE — 3E0234Z INTRODUCTION OF SERUM, TOXOID AND VACCINE INTO MUSCLE, PERCUTANEOUS APPROACH: ICD-10-PCS | Performed by: HOSPITALIST

## 2018-01-04 PROCEDURE — 93325 DOPPLER ECHO COLOR FLOW MAPG: CPT | Performed by: INTERNAL MEDICINE

## 2018-01-04 PROCEDURE — 93312 ECHO TRANSESOPHAGEAL: CPT

## 2018-01-04 PROCEDURE — 33282 PR IMPLANTATION PT-ACTIVATED CARDIAC EVENT RECORDER: CPT | Performed by: INTERNAL MEDICINE

## 2018-01-04 RX ORDER — SODIUM CHLORIDE 0.9 % (FLUSH) 0.9 %
10 SYRINGE (ML) INJECTION PRN
Status: DISCONTINUED | OUTPATIENT
Start: 2018-01-04 | End: 2018-01-05 | Stop reason: HOSPADM

## 2018-01-04 RX ORDER — SODIUM CHLORIDE 0.9 % (FLUSH) 0.9 %
10 SYRINGE (ML) INJECTION EVERY 12 HOURS SCHEDULED
Status: DISCONTINUED | OUTPATIENT
Start: 2018-01-04 | End: 2018-01-05 | Stop reason: HOSPADM

## 2018-01-04 RX ORDER — SODIUM CHLORIDE 9 MG/ML
INJECTION, SOLUTION INTRAVENOUS CONTINUOUS
Status: DISCONTINUED | OUTPATIENT
Start: 2018-01-04 | End: 2018-01-05 | Stop reason: HOSPADM

## 2018-01-04 RX ADMIN — SODIUM CHLORIDE: 9 INJECTION, SOLUTION INTRAVENOUS at 10:21

## 2018-01-04 RX ADMIN — OLANZAPINE 5 MG: 2.5 TABLET, FILM COATED ORAL at 21:36

## 2018-01-04 RX ADMIN — INFLUENZA A VIRUS A/CALIFORNIA/7/2009 X-179A (H1N1) ANTIGEN (FORMALDEHYDE INACTIVATED), INFLUENZA A VIRUS A/TEXAS/50/2012 X-223A (H3N2) ANTIGEN (FORMALDEHYDE INACTIVATED), INFLUENZA B VIRUS B/MASSACHUSETTS/2/2012 BX-51B ANTIGEN (FORMALDEHYDE INACTIVATED), AND INFLUENZA B VIRUS B/BRISBANE/60/2008 ANTIGEN (FORMALDEHYDE INACTIVATED) 0.5 ML: 15; 15; 15; 15 INJECTION, SUSPENSION INTRAMUSCULAR at 10:15

## 2018-01-04 RX ADMIN — GABAPENTIN 300 MG: 300 CAPSULE ORAL at 10:11

## 2018-01-04 RX ADMIN — FLUOXETINE HYDROCHLORIDE 20 MG: 20 CAPSULE ORAL at 10:11

## 2018-01-04 RX ADMIN — SODIUM CHLORIDE: 9 INJECTION, SOLUTION INTRAVENOUS at 21:37

## 2018-01-04 RX ADMIN — PRAVASTATIN SODIUM 40 MG: 20 TABLET ORAL at 21:37

## 2018-01-04 RX ADMIN — GABAPENTIN 300 MG: 300 CAPSULE ORAL at 13:28

## 2018-01-04 RX ADMIN — FLUTICASONE PROPIONATE 1 SPRAY: 50 SPRAY, METERED NASAL at 10:12

## 2018-01-04 RX ADMIN — GABAPENTIN 300 MG: 300 CAPSULE ORAL at 21:36

## 2018-01-04 RX ADMIN — ENOXAPARIN SODIUM 40 MG: 40 INJECTION SUBCUTANEOUS at 21:36

## 2018-01-04 RX ADMIN — Medication 81 MG: at 10:11

## 2018-01-04 NOTE — PROGRESS NOTES
Patient:   Margy Dias  MR#:    [id number]   Room:    [address] [place] [address]   YOB: 1965  Date of Progress Note: 2018  Time of Note                           9:12 AM  Consulting Physician:   Landry Kruse M.D. Attending Physician:  Cali Carbajal MD     Chief complaint left sided weakness    S:This 50 y.o. female  who presents with left-sided weakness, right-sided numbness, imbalance, dizziness, dysarthria that started about a week ago. Patient denies any previous stroke but she is a smoker. Currently she is homeless with her . Approximately a week ago she noted left-sided weakness and facial drooping. She feels she had some right-sided numbness. A few weeks prior to admission she lost control of the right side. She indicates her Balance is affected. She indicates that she has had issues with walking over the last 1 year.  No acute issues overnight      REVIEW OF SYSTEMS:  Constitutional: No fevers No chills  Neck:No stiffness  Respiratory: No shortness of breath  Cardiovascular: No chest pain No palpitations  Gastrointestinal: No abdominal pain    Genitourinary: No Dysuria  Neurological: No headache, no confusion    Past Medical History:      Diagnosis Date    Anxiety     Depression     Hypertension        Past Surgical History:      Procedure Laterality Date     SECTION      x2    CHOLECYSTECTOMY      COSMETIC SURGERY      HAND SURGERY         Medications in Hospital:      Current Facility-Administered Medications:     aspirin EC tablet 81 mg, 81 mg, Oral, Daily, Landry Kruse MD, 81 mg at 18    pravastatin (PRAVACHOL) tablet 40 mg, 40 mg, Oral, Nightly, Landry Kruse MD, 40 mg at 18    FLUoxetine (PROZAC) capsule 20 mg, 20 mg, Oral, Daily, Leanne Gay PA-C, 20 mg at 18 184    fluticasone (FLONASE) 50 MCG/ACT nasal spray 1 spray, 1 spray, Nasal, Daily, Leanne Gay PA-C, 1 spray at 18 182    gabapentin (NEURONTIN) capsule 300 Updated: 01/02/18 1053    Narrative:     Examination. MRA HEAD WO CONTRAST  History: Stroke. MR angiography of the brain is performed with 2-D time-of-flight  imaging sequence without contrast enhancement. There is subsequent 3-D  maximum intensity projection image reconstruction. The source images  and reformatted images are reviewed. There is no previous study for  comparison. The correlation is made with the previous MR imaging of  the brain obtained earlier today. There are normal size internal carotid arteries near the base of the  skull and in the carotid canals bilaterally. There are normal size  internal carotid arteries in the cavernous sinus and suprasellar  region bilaterally. The left internal carotid artery then divides into  normal-appearing middle and anterior cerebral arteries. There is  normal visualization of the insular and opercular branches of the left  middle cerebral artery. The right internal carotid artery divides into  normal-appearing anterior cerebral artery. There is an area of  narrowing and displacement at the junction of M1 and M2 segment of the  right middle cerebral artery. This area is poorly defined due to  motion/imaging artifacts. The insular branches of the right middle  cerebral arteries are very poorly visualized and are not displaced  laterally. Opercular branches are also poorly visualized. There are no  areas of focal aneurysmal dilatation. A normal size/dominant right vertebral artery and a small and  attenuated left vertebral arteries enter the foramen magnum and join  to make a normal size basilar artery. It divides into a  tiny/attenuated P1 segment of the left posterior cerebral artery and a  normal size right posterior cerebral artery. There is a large left  posterior communicating artery joining and attenuated P1 segment to  make a normal size P2 and P3 segment of the left posterior cerebral  artery. No areas of focal stenosis or aneurysmal dilatation. fahrs disease . There is extensive calcification in the basal ganglia  region and also in the dentate nuclei region. Signed by Dr Olayinka Ozuna on 1/2/2018 10:32 AM   VL DUP CAROTID BILATERAL [[id number]] Collected: 01/02/18 0701   Updated: 01/02/18 0949    Narrative:     Vascular Carotid Procedure     Demographics      Patient Name Juan Redman Age                52                 F      Patient       [id number]         Gender             Female   Number      Visit Number  [id number]      Interpreting       Arnaldo Shirley MD                                Physician      Date of Birth 1965     Referring          NEO WHITE                                Physician      Accession     [id number]      Sonographer        Cheryl Mcfadden   Number                                          RVS, RCS     Procedure  Type of Study:      Cerebral:Carotid, VL CAROTID BILATERAL.     Indications for Study:CVA and Ataxia. Risk Factors      - The patient's risk factor(s) include: arterial hypertension.      - The patient has a current/recent (within 1 year) tobacco history.     Impression      There is mixed plaque visualized in the bilateral internal carotid   artery(ies).   There is less than 50% stenosis in the right internal carotid artery.  Suann  is less than 50% stenosis of the left internal carotid artery.   There is normal antegrade flow in the bilateral vertebral artery(ies).      Signature      ----------------------------------------------------------------   Electronically signed by Arnaldo Shirley MD(Interpreting   physician) on 01/02/2018 09:49 AM   ----------------------------------------------------------------     Blood Pressure:Right arm 130/90 mmHg. Left arm 130/100 mmHg. Velocities are measured in cm/s ; Diameters are measured in mm    Carotid Right Measurements  +------------+-------+-------+--------+-------+------------+---------------+  ! Location    !PSV    !EDV    ! Angle   !RI

## 2018-01-04 NOTE — PROGRESS NOTES
Physical Therapy  Tatiana Holm  [id number]     01/04/18 1416   Subjective   Subjective Agrees to work with therapy   Bed Mobility   Supine to Sit Minimal assistance   Transfers   Sit to Stand Contact guard assistance   Stand to sit Contact guard assistance   Ambulation   Ambulation? Yes   Ambulation 1   Surface level tile   Device Standard Walker   Assistance Minimal assistance   Quality of Gait drags L LE, not safe for long distance, ok to/from BR with assist, w/c for longer distance for safety   Distance 5' x2   Other Activities   Comment Worked with patient on bed mobility, sit to stand transfers, patient took a few steps forward and back to bed. Patient sat EOB for recovery time and then was able to take steps to the chair with Min assist. L hand has a hard time staying on the walker, patient needs v/c's and t/c's for sequencing of gait, patient drags L LE with ambulation. Patient agreed to sit up in recliner, positioned for comfort with all needs in reach. Short term goals   Time Frame for Short term goals 14 days before re eval   Short term goal 1 SUP<>SIT SBA   Short term goal 2 SIT<>STAND SBA   Short term goal 3 AMB 25 FT WITH RW AND SBA   Activity Tolerance   Activity Tolerance Patient Tolerated treatment well   Safety Devices   Type of devices Call light within reach; Chair alarm in place; Left in chair   Electronically signed by Sharon Grey PTA on 1/4/2018 at 2:27 PM

## 2018-01-04 NOTE — PROCEDURES
[facility]    Transesophageal Echocardiogram      1/4/2018    :  JOSE MARIA Lawrence MD    Indications:  Possible cryptogenic stroke    Description of Procedure: The patient was placed int he left lateral decubitus position. The patient received 50 micrograms of Fentanyl and 3 mg of Midazolam (Versed). The probe was passed easily to the fundus of the stomach with adequate views obtained. Findings:    1. The aortic root was not well visualized. 2.  The aortic valve was tri leaflet. There was trace significant aortic insufficiency. 3.  The mitral valve leaflets were of normal thickness and mobility. There was trace significant mitral regurgitation. 4.  The left atrium was not well visualized. 5.  The left ventricle was of normal size with an estimated ejection fraction of > 55%. 6.  There was noted to no thrombus in the appendage, nor is there atrial \"smoke\"  7. No significant pericardial effusion was detected. 8.  There was no intra cardiac communication noted with agitated saline injection. Complications:  None    Impression: This transesophageal echocardiogram revealed:    1.    No obvious intracardiac thrombus or communication    Electronically signed by Chavez Fleming MD on 1/4/18

## 2018-01-05 ENCOUNTER — HOSPITAL ENCOUNTER (INPATIENT)
Age: 53
LOS: 24 days | Discharge: HOME HEALTH | DRG: 057 | End: 2018-01-29
Attending: PSYCHIATRY & NEUROLOGY | Admitting: PSYCHIATRY & NEUROLOGY
Payer: MEDICAID

## 2018-01-05 VITALS
BODY MASS INDEX: 33.79 KG/M2 | OXYGEN SATURATION: 95 % | SYSTOLIC BLOOD PRESSURE: 104 MMHG | HEIGHT: 62 IN | RESPIRATION RATE: 16 BRPM | TEMPERATURE: 97.9 F | WEIGHT: 183.6 LBS | DIASTOLIC BLOOD PRESSURE: 71 MMHG | HEART RATE: 80 BPM

## 2018-01-05 DIAGNOSIS — I10 ESSENTIAL HYPERTENSION: Chronic | ICD-10-CM

## 2018-01-05 DIAGNOSIS — R26.9 GAIT ABNORMALITY: ICD-10-CM

## 2018-01-05 DIAGNOSIS — R47.1 DYSARTHRIA: ICD-10-CM

## 2018-01-05 DIAGNOSIS — G25.9 LESION OF BASAL GANGLIA: ICD-10-CM

## 2018-01-05 DIAGNOSIS — I63.40 CEREBROVASCULAR ACCIDENT (CVA) DUE TO EMBOLISM OF CEREBRAL ARTERY (HCC): Primary | ICD-10-CM

## 2018-01-05 LAB — METHYLMALONIC ACID: 0.13 UMOL/L (ref 0–0.4)

## 2018-01-05 PROCEDURE — 6360000002 HC RX W HCPCS: Performed by: PSYCHIATRY & NEUROLOGY

## 2018-01-05 PROCEDURE — 97530 THERAPEUTIC ACTIVITIES: CPT

## 2018-01-05 PROCEDURE — 2700000000 HC OXYGEN THERAPY PER DAY

## 2018-01-05 PROCEDURE — 6370000000 HC RX 637 (ALT 250 FOR IP): Performed by: PHYSICIAN ASSISTANT

## 2018-01-05 PROCEDURE — 99233 SBSQ HOSP IP/OBS HIGH 50: CPT | Performed by: PSYCHIATRY & NEUROLOGY

## 2018-01-05 PROCEDURE — 97116 GAIT TRAINING THERAPY: CPT

## 2018-01-05 PROCEDURE — 99239 HOSP IP/OBS DSCHRG MGMT >30: CPT | Performed by: INTERNAL MEDICINE

## 2018-01-05 PROCEDURE — 94762 N-INVAS EAR/PLS OXIMTRY CONT: CPT

## 2018-01-05 PROCEDURE — 6370000000 HC RX 637 (ALT 250 FOR IP): Performed by: INTERNAL MEDICINE

## 2018-01-05 PROCEDURE — 1180000000 HC REHAB R&B

## 2018-01-05 PROCEDURE — 6370000000 HC RX 637 (ALT 250 FOR IP): Performed by: PSYCHIATRY & NEUROLOGY

## 2018-01-05 PROCEDURE — 99231 SBSQ HOSP IP/OBS SF/LOW 25: CPT | Performed by: NEUROLOGICAL SURGERY

## 2018-01-05 RX ORDER — FLUTICASONE PROPIONATE 50 MCG
1 SPRAY, SUSPENSION (ML) NASAL DAILY
Status: DISCONTINUED | OUTPATIENT
Start: 2018-01-06 | End: 2018-01-29 | Stop reason: HOSPADM

## 2018-01-05 RX ORDER — PRAVASTATIN SODIUM 20 MG
40 TABLET ORAL NIGHTLY
Status: CANCELLED | OUTPATIENT
Start: 2018-01-05

## 2018-01-05 RX ORDER — HYDROXYZINE PAMOATE 25 MG/1
25 CAPSULE ORAL 3 TIMES DAILY PRN
Status: CANCELLED | OUTPATIENT
Start: 2018-01-05

## 2018-01-05 RX ORDER — BISACODYL 10 MG
10 SUPPOSITORY, RECTAL RECTAL DAILY PRN
Status: DISCONTINUED | OUTPATIENT
Start: 2018-01-05 | End: 2018-01-29 | Stop reason: HOSPADM

## 2018-01-05 RX ORDER — FLUTICASONE PROPIONATE 50 MCG
1 SPRAY, SUSPENSION (ML) NASAL DAILY
Status: CANCELLED | OUTPATIENT
Start: 2018-01-06

## 2018-01-05 RX ORDER — DOCUSATE SODIUM 100 MG/1
100 CAPSULE, LIQUID FILLED ORAL 2 TIMES DAILY PRN
Status: DISCONTINUED | OUTPATIENT
Start: 2018-01-05 | End: 2018-01-29 | Stop reason: HOSPADM

## 2018-01-05 RX ORDER — PRAVASTATIN SODIUM 20 MG
40 TABLET ORAL NIGHTLY
Status: DISCONTINUED | OUTPATIENT
Start: 2018-01-05 | End: 2018-01-29 | Stop reason: HOSPADM

## 2018-01-05 RX ORDER — ASPIRIN 81 MG/1
81 TABLET ORAL DAILY
Status: CANCELLED | OUTPATIENT
Start: 2018-01-06

## 2018-01-05 RX ORDER — GABAPENTIN 300 MG/1
300 CAPSULE ORAL 3 TIMES DAILY
Status: DISCONTINUED | OUTPATIENT
Start: 2018-01-05 | End: 2018-01-29 | Stop reason: HOSPADM

## 2018-01-05 RX ORDER — FLUOXETINE HYDROCHLORIDE 20 MG/1
20 CAPSULE ORAL DAILY
Status: CANCELLED | OUTPATIENT
Start: 2018-01-06

## 2018-01-05 RX ORDER — ASPIRIN 81 MG/1
81 TABLET ORAL DAILY
Status: DISCONTINUED | OUTPATIENT
Start: 2018-01-06 | End: 2018-01-29 | Stop reason: HOSPADM

## 2018-01-05 RX ORDER — OLANZAPINE 5 MG/1
5 TABLET ORAL NIGHTLY
Status: DISCONTINUED | OUTPATIENT
Start: 2018-01-05 | End: 2018-01-29 | Stop reason: HOSPADM

## 2018-01-05 RX ORDER — OLANZAPINE 2.5 MG/1
5 TABLET ORAL NIGHTLY
Status: CANCELLED | OUTPATIENT
Start: 2018-01-05

## 2018-01-05 RX ORDER — GABAPENTIN 300 MG/1
300 CAPSULE ORAL 3 TIMES DAILY
Status: CANCELLED | OUTPATIENT
Start: 2018-01-05

## 2018-01-05 RX ORDER — ACETAMINOPHEN 325 MG/1
650 TABLET ORAL EVERY 4 HOURS PRN
Status: CANCELLED | OUTPATIENT
Start: 2018-01-05

## 2018-01-05 RX ORDER — HYDROXYZINE PAMOATE 25 MG/1
25 CAPSULE ORAL 3 TIMES DAILY PRN
Status: DISCONTINUED | OUTPATIENT
Start: 2018-01-05 | End: 2018-01-29 | Stop reason: HOSPADM

## 2018-01-05 RX ORDER — FLUOXETINE HYDROCHLORIDE 20 MG/1
20 CAPSULE ORAL DAILY
Status: DISCONTINUED | OUTPATIENT
Start: 2018-01-06 | End: 2018-01-29 | Stop reason: HOSPADM

## 2018-01-05 RX ORDER — ACETAMINOPHEN 325 MG/1
650 TABLET ORAL EVERY 4 HOURS PRN
Status: DISCONTINUED | OUTPATIENT
Start: 2018-01-05 | End: 2018-01-29 | Stop reason: HOSPADM

## 2018-01-05 RX ADMIN — PRAVASTATIN SODIUM 40 MG: 20 TABLET ORAL at 21:30

## 2018-01-05 RX ADMIN — ENOXAPARIN SODIUM 40 MG: 40 INJECTION SUBCUTANEOUS at 21:30

## 2018-01-05 RX ADMIN — Medication 81 MG: at 08:52

## 2018-01-05 RX ADMIN — FLUTICASONE PROPIONATE 1 SPRAY: 50 SPRAY, METERED NASAL at 08:52

## 2018-01-05 RX ADMIN — FLUOXETINE HYDROCHLORIDE 20 MG: 20 CAPSULE ORAL at 08:52

## 2018-01-05 RX ADMIN — GABAPENTIN 300 MG: 300 CAPSULE ORAL at 21:30

## 2018-01-05 RX ADMIN — GABAPENTIN 300 MG: 300 CAPSULE ORAL at 08:52

## 2018-01-05 RX ADMIN — OLANZAPINE 5 MG: 5 TABLET, FILM COATED ORAL at 21:30

## 2018-01-05 RX ADMIN — GABAPENTIN 300 MG: 300 CAPSULE ORAL at 14:41

## 2018-01-05 NOTE — DISCHARGE SUMMARY
Hospitalist  Discharge Summary    Patient ID: Home Andreina      Patient's PCP: JOHN Banks    Admit Date: 1/1/2018     Discharge Date:   1/5/2018    Admitting Physician: Leopold Griffes, MD     Discharge Physician: Subha Ndiaye DO     Discharge Diagnoses:  Principal Problem:    Cerebrovascular accident (CVA) [facility])  Active Problems:    Major depressive disorder, recurrent, severe without psychotic features ([facility]    Essential hypertension    Hemiplegia affecting non-dominant side, post-stroke ([address]    Gait abnormality    Ataxia    Numbness    Dysarthria    Lesion of basal ganglia    Cerebrovascular accident (CVA) determined by clinical assessment [facility])       C/Alycia [address] Problems    Diagnosis Date Noted    Cerebrovascular accident (CVA) determined by clinical assessment ([facility] [I63.9]     Lesion of basal ganglia [G25.9]     Hemiplegia affecting non-dominant side, post-stroke ([address] [I69.359] 01/02/2018    Gait abnormality [R26.9] 01/02/2018    Ataxia [R27.0] 01/02/2018    Numbness [R20.0] 01/02/2018    Dysarthria [R47.1] 01/02/2018    Cerebrovascular accident (CVA) ([address] [I63.9] 01/01/2018    Essential hypertension [I10] 01/01/2018    Major depressive disorder, recurrent, severe without psychotic features ([facility] [F33.2] 11/13/2016       The patient was seen and examined on day of discharge and this discharge summary is in conjunction with any daily progress note from day of discharge. Hospital Course: 49 y/o female presenting to [facility] ER complaining of L sided weakness, headache, R sided numbness, dizziness, slurred speech. CT of the head on arrival w/ R frontal hypodensities. Admitted as CVA w/ neuro consult. Subsequent Mri showed Basal ganglia infarct as well as basal ganglia lesion. Pt underwent WAQAR and loop recorder placement. No clots found. NICS w/o significant lesion.  Pt was seen by neurosurgery for possible basal ganglia lesion but they want to monitor and repeat MRI in 2 posterior cerebral artery. There is a large left  posterior communicating artery joining and attenuated P1 segment to  make a normal size P2 and P3 segment of the left posterior cerebral  artery. No areas of focal stenosis or aneurysmal dilatation.     Impression:       An area of narrowing at the M1 and M2 junction of the  right middle cerebral artery with displacement of the insular and  opercular branches which are poorly visualized. The possibility of a  mass in the area is suspected. A fetal type posterior circulation. Signed by Dr Delisa Ochoa on 1/2/2018 10:50 AM     MRI BRAIN W WO CONTRAST [[id number]] Resulted: 01/02/18 1132     Order Status: Completed Updated: 01/02/18 1034     Narrative:       MRI BRAIN W WO CONTRAST 1/2/2018 6:00 AM  HISTORY: CVA size for one week  Comparison: CT scan of January 1, 2018 CT scan September 7, 2017    Technique: Multiplanar imaging of the brain was performed in a routine  fashion before and after the intravenous injection of gadolinium  contrast.  Findings:   Midline structures are nondisplaced. There is mass effect against the  frontal horn and body of the right lateral ventricle. . . Basilar  cisterns are preserved. Restricted diffusion is present in the right  centrum semiovale. Subtle blood products are present in the right  basal ganglia and head of the caudate nucleus on the right. Some  associated enhancement is present. An underlying neoplasm cannot be  excluded. . No abnormal extra axial fluid collections are noted. There is restricted diffusion in the right hemisphere adjacent to the  subtle hemorrhagic lesion. .   Proximal cervical spinal cord, brainstem, and cerebellum are  unremarkable. Normal cerebrovascular flow voids are seen. Bilateral  globes and orbits are normal in appearance. No abnormal signal is noted in the mastoid air cells or paranasal  sinuses.      Impression:       Impression:   Right basal ganglia lesion is present. This is 17 x 42 mm. This  contains some blood products. There are also some subtle peripheral  enhancement. Associated restricted diffusion is noted peripherally. Although  restricted diffusion suggests ischemic infarction in the basal ganglia  this lesion may represent a neoplasm. Follow-up is suggested. 2.   fahrs disease . There is extensive calcification in the basal ganglia  region and also in the dentate nuclei region. Signed by Dr Obie Mohs on 1/2/2018 10:32 AM     VL DUP CAROTID BILATERAL [[id number]] Collected: 01/02/18 0701     Order Status: Completed Updated: 01/02/18 0949     Narrative:       Vascular Carotid Procedure     Demographics      Patient Name Lana Ortega Age                52                 F      Patient       [id number]         Gender             Female   Number      Visit Number  [id number]      Interpreting       Nnamdi Wyman MD                                Physician      Date of Birth 1965     Referring          NEO WHITE                                Physician      Accession     [id number]      Sonographer        Cheryl Mcfadden   Number                                          RVS, RCS     Procedure  Type of Study:      Cerebral:Carotid, VL CAROTID BILATERAL.     Indications for Study:CVA and Ataxia.     Risk Factors      - The patient's risk factor(s) include: arterial hypertension.      - The patient has a current/recent (within 1 year) tobacco history.     Impression      There is mixed plaque visualized in the bilateral internal carotid   artery(ies).   There is less than 50% stenosis in the right internal carotid artery.  Jade Maze is less than 50% stenosis of the left internal carotid artery.   There is normal antegrade flow in the bilateral vertebral artery(ies).      Signature      ----------------------------------------------------------------   Electronically signed by Nnamdi Wyman MD(Interpreting   physician) on 01/02/2018 09:49 AM       CT ABDOMEN PELVIS W IV CONTRAST umbilical hernia seen. There is a small focal left lower abdominal subcutaneous soft tissue  air which may represent a recent trauma or subcutaneous injection. Similar changes are seen in the right lower abdomen. Mild atheromatous changes of the abdominal aorta and iliac arteries  are seen. No aneurysmal dilatation. There is no evidence of abdominal or pelvic lymphadenopathy. No bony abnormalities noted.     Impression:       No acute abnormality of the abdomen or pelvis. A small stable cyst in the lower pole of the right kidney. Evidence of a prior cholecystectomy and a mild dilatation of the  common bile duct. No evidence of intra-abdominal mass or lymphadenopathy. Signed by Dr Yuni Burton on 1/3/2018 3:23 PM     CT Chest W Contrast [[id number]] Resulted: 01/03/18 1613     Order Status: Completed Updated: 01/03/18 1515     Narrative:       Examination. CT CHEST W CONTRAST  History: Evaluation for mass. DLP: 1512 mGy. CT scan of the chest is performed after intravenous contrast  enhancement. The images are acquired in axial plane with subsequent  reconstruction in coronal and sagittal planes. There is no previous study for comparison. There is a small groundglass nodule in the right middle lobe, image  #82 in axial plane, measuring 4 mm in diameter. There are scattered areas of groundglass opacities in the right lower  lobe. There are areas of discoid atelectasis and scarring in the lower  lungs bilaterally. There is also an area of interstitial thickening  and scarring in the right middle lobe medially. There is small focal  area of consolidation adjacent to the cardiac border in the right  middle lobe. This probably represent an area of scarring. Normal opacification of the thoracic aorta are seen. Normal  opacification of the pulmonary arteries and branches are noted. No  filling defect. Normal-appearing coronary arteries are seen. No  significant atheromatous plaques.   There are calcified

## 2018-01-05 NOTE — CARE COORDINATION
[facility] [address] [facility]  Notification of Admission Decision      [x] Patient has been accepted for admit to [facility] on : 1/5/18 Room 812      Please write discharge orders and summary prior to discharge. [] Patient acceptance to Rehab pending the following :    [] Eval in progress       [] Patient determined to be ineligible for services at [facility] because : We recommend you consider        Thank you for your referral, we appreciate you.     Electronically Signed by Montez Scruggs Admissions Coordinator 1/5/2018 10:12 AM

## 2018-01-05 NOTE — PROGRESS NOTES
Gender                 Female      Date of Birth 1965       Room Number            [id number]      Age           52 year(s)      Height:       62 inches        Referring Physician    NEO WHITE      Weight:       173 pounds       Sonographer            Delilah Disla [facility]      BSA:          1.8 m^2          Interpreting Physician Julene Burkitt MD      BMI:          31.64 kg/m^2     Procedure    Type of Study      TTE procedure:ECHO NO CONTRAST WITH DOP/COLR.     Study Location: Echo Lab  Technical Quality: Adequate visualization    Patient Status: Inpatient    Contrast Medium: Bubble Study. Indications:TIA.     Conclusions      Summary   Structurally normal.   Normal mitral valve leaflet mobility.   No evidence of mitral regurgitation.   Individual aortic valve leaflets are not clearly visualized.   No significant aortic regurgitation or stenosis is noted.   Normal left ventricular size with preserved LV function and an estimated   ejection fraction of approximately 55%.  Mild concentric left ventricular hypertrophy noted.   No regional wall motion abnormalities identified.    E/A flow reversal pattern consistent with Grade I diastolic dysfunction.   No evidence of left ventricular mass or thrombus noted.      Signature      ----------------------------------------------------------------   Electronically signed by Julene Burkitt MD(Interpreting   physician) on 01/02/2018 02:20 PM   ----------------------------------------------------------------      Findings      Mitral Valve   Structurally normal.   Normal mitral valve leaflet mobility.   No evidence of mitral regurgitation.      Aortic Valve   Individual aortic valve leaflets are not clearly visualized.   No significant aortic regurgitation or stenosis is noted.      Tricuspid Valve   Tricuspid valve is structurally normal.   There was trace tricuspid regurgitation.      Pulmonic Valve   Pulmonic valve is structurally normal.      Left MRA HEAD WO CONTRAST [[id number]] Resulted: 01/02/18 1150   Updated: 01/02/18 1053    Narrative:     Examination. MRA HEAD WO CONTRAST  History: Stroke. MR angiography of the brain is performed with 2-D time-of-flight  imaging sequence without contrast enhancement. There is subsequent 3-D  maximum intensity projection image reconstruction. The source images  and reformatted images are reviewed. There is no previous study for  comparison. The correlation is made with the previous MR imaging of  the brain obtained earlier today. There are normal size internal carotid arteries near the base of the  skull and in the carotid canals bilaterally. There are normal size  internal carotid arteries in the cavernous sinus and suprasellar  region bilaterally. The left internal carotid artery then divides into  normal-appearing middle and anterior cerebral arteries. There is  normal visualization of the insular and opercular branches of the left  middle cerebral artery. The right internal carotid artery divides into  normal-appearing anterior cerebral artery. There is an area of  narrowing and displacement at the junction of M1 and M2 segment of the  right middle cerebral artery. This area is poorly defined due to  motion/imaging artifacts. The insular branches of the right middle  cerebral arteries are very poorly visualized and are not displaced  laterally. Opercular branches are also poorly visualized. There are no  areas of focal aneurysmal dilatation. A normal size/dominant right vertebral artery and a small and  attenuated left vertebral arteries enter the foramen magnum and join  to make a normal size basilar artery. It divides into a  tiny/attenuated P1 segment of the left posterior cerebral artery and a  normal size right posterior cerebral artery. There is a large left  posterior communicating artery joining and attenuated P1 segment to  make a normal size P2 and P3 segment of the left posterior cerebral  artery.  No Measurements  +------------+-------+-------+--------+-------+------------+---------------+  ! Location    !PSV    !EDV    ! Angle   !RI     !%Stenosis   ! Tortuosity     !  +------------+-------+-------+--------+-------+------------+---------------+  ! Prox CCA    !68.8   !26.7   !60      !0.61   !            !               !  +------------+-------+-------+--------+-------+------------+---------------+  ! Mid CCA     !65.6   !24.8   !60      !0.62   !            !               !  +------------+-------+-------+--------+-------+------------+---------------+  ! Prox ICA    !52.6   !22     !60      !0.58   !            !               !  +------------+-------+-------+--------+-------+------------+---------------+  ! Mid ICA     !40.9   !26.3   !60      !0.36   !            !               !  +------------+-------+-------+--------+-------+------------+---------------+  ! Dist ICA    !80.3   !38.1   !60      !0.53   !            !               !  +------------+-------+-------+--------+-------+------------+---------------+  ! Prox ECA    !56.2   !16.1   !60      !0.71   !            !               !  +------------+-------+-------+--------+-------+------------+---------------+  ! Vertebral   !30.5   !14.8   !60      !0.51   !            !               !  +------------+-------+-------+--------+-------+------------+---------------+      - There is antegrade vertebral flow noted on the right side.      - Additional Measurements:ICAPSV/CCAPSV 1.17. ICAEDV/CCAEDV 1.43. Carotid Left Measurements  +------------+-------+-------+--------+-------+------------+---------------+  ! Location    !PSV    !EDV    ! Angle   !RI     !%Stenosis   ! Tortuosity     !  +------------+-------+-------+--------+-------+------------+---------------+  ! Prox CCA    !63.3   !24.8   !60      !0.61   !            !               !  +------------+-------+-------+--------+-------+------------+---------------+  ! Mid CCA     !71.1   !31.4   !60      !0.56   !       is performed after intravenous contrast  enhancement. The images are acquired in axial plane with subsequent  reconstruction in coronal and sagittal planes. There is no previous study for comparison. There is a small groundglass nodule in the right middle lobe, image  #82 in axial plane, measuring 4 mm in diameter. There are scattered areas of groundglass opacities in the right lower  lobe. There are areas of discoid atelectasis and scarring in the lower  lungs bilaterally. There is also an area of interstitial thickening  and scarring in the right middle lobe medially. There is small focal  area of consolidation adjacent to the cardiac border in the right  middle lobe. This probably represent an area of scarring. Normal opacification of the thoracic aorta are seen. Normal  opacification of the pulmonary arteries and branches are noted. No  filling defect. Normal-appearing coronary arteries are seen. No  significant atheromatous plaques. There are calcified granulomas in the right hilum. A calcified  granuloma seen in the right upper lobe. There is no evidence of mediastinal or hilar mass or lymphadenopathy. Benign-appearing lymph nodes are seen in the axilla bilaterally. The thyroid gland appears normal. No intrinsic mass. The visualized  part of the neck show no evidence of mass or lymphadenopathy. The liver and spleen as visualized appear normal. The adrenal glands  appear normal.  The images reviewed in bone window show moderate chronic degenerative  changes of the thoracolumbar spine with a mild levoscoliosis. Impression:     A 4 mm groundglass nodule in the right middle lobe. A  follow-up examination in one year is recommended to ensure  stability/resolution. Groundglass opacities in the lower lungs bilaterally, more so in the  right lower lung may represent inflammatory or infectious process. Scarring and areas of discoid atelectasis in the lungs bilaterally.   No evidence of mediastinal, hilar or axillary lymphadenopathy. Old granulomatous lung changes. Signed by Dr Lydia Johnson on 1/3/2018 3:13 PM          Transesophageal Echocardiogram        1/4/2018     :  JOSE MARIA Lugo MD     Indications:  Possible cryptogenic stroke     Description of Procedure:     The patient was placed int he left lateral decubitus position. The patient received 50 micrograms of Fentanyl and 3 mg of Midazolam (Versed). The probe was passed easily to the fundus of the stomach with adequate views obtained.     Findings:     1. The aortic root was not well visualized. 2.  The aortic valve was tri leaflet. There was trace significant aortic insufficiency. 3.  The mitral valve leaflets were of normal thickness and mobility. There was trace significant mitral regurgitation. 4.  The left atrium was not well visualized. 5.  The left ventricle was of normal size with an estimated ejection fraction of > 55%. 6.  There was noted to no thrombus in the appendage, nor is there atrial \"smoke\"  7. No significant pericardial effusion was detected. 8.  There was no intra cardiac communication noted with agitated saline injection.     Complications:  None     Impression:     This transesophageal echocardiogram revealed:     1.    No obvious intracardiac thrombus or communication     Electronically signed by Abiel Barron MD on 1/4/18   RECORD REVIEW: Previous medical records, medications were reviewed at today's visit    IMPRESSION:   Left-sided weakness//ataxia/left upper and lower facial droop/dysarthria right sided numbness resolved  MRi brain reviewed with radiology-a few areas of small acute ischemic infarction in the right hemisphere consistent with embolic process- carotid US and echo essentially unremarkable  Appreciate cardiology input   S/p Loop recorder  WAQAR unremarkable  In addition there is a 17x 42 mm enhancing lesion on the right basal ganglia-appreciate neurosurgical input- agree on repeat MRI brain in a few weeks  CT C/A/P essentially unremarkable  Incidental calcification noted in basal ganglia as well  on aspirin for stroke prophylaxis   -start statin  Hypercoagulable panel so far unremarkable  hemoglobin A1c 5.9  Smoking cessation  DVT prophylaxis-Lovenox  Mood disorder on medications  PT/OT/speech  Rehab consult pending  Ok to rehab from neuro standpoint If accepted    [facility]  [phone] CELL  Dr Sabra Hall

## 2018-01-05 NOTE — PROGRESS NOTES
nose, throat - No scars, masses, or lesions over external nose or ears, no atrophy of tongue  Neck-symmetric, no masses noted, no jugular vein distension  Respiration- chest wall appears symmetric, good expansion, normal effort without use of accessory muscles  Musculoskeletal - no significant wasting of muscles noted, no bony deformities, gait no gross ataxia  Extremities-no clubbing, cyanosis or edema  Skin - warm, dry, and intact. No rash, erythema, or pallor. Psychiatric - mood, affect, and behavior appear normal.     Neurologic Examination  Awake, Alert and oriented x 3  Left sided facial droop  Pronator drift on left  Normal speech pattern, following commands  Identifies objects appropriately   Motor 4-/5 entire LUE, subtle weakness in LLE   No deficits to light touch or pinprick sensation  Reflexes 2+ and symmetric      DATA:  Nursing/pcp notes, imaging,labs and vitals reviewed. PT,OT and/or speech notes reviewed    Lab Results   Component Value Date    WBC 6.6 01/02/2018    HGB 13.1 01/02/2018    HCT 39.7 01/02/2018    MCV 98.5 01/02/2018     01/02/2018     Lab Results   Component Value Date     01/02/2018    K 3.7 01/02/2018     01/02/2018    CO2 25 01/02/2018    BUN 14 01/02/2018    CREATININE 0.7 01/02/2018    GLUCOSE 93 01/02/2018    CALCIUM 9.1 01/02/2018    PROT 7.1 01/01/2018    LABALBU 4.1 01/01/2018    BILITOT <0.2 01/01/2018    ALKPHOS 67 01/01/2018    AST 14 01/01/2018    ALT 22 01/01/2018    LABGLOM >60 01/02/2018    GLOB 3.0 11/12/2016     Lab Results   Component Value Date    INR 1.03 01/01/2018    PROTIME 13.4 01/01/2018     Narrative   Examination. MRA HEAD WO CONTRAST   History: Stroke. MR angiography of the brain is performed with 2-D time-of-flight   imaging sequence without contrast enhancement. There is subsequent 3-D   maximum intensity projection image reconstruction. The source images   and reformatted images are reviewed.  There is no previous study for

## 2018-01-06 LAB
ALBUMIN SERPL-MCNC: 3.7 G/DL (ref 3.5–5.2)
ALP BLD-CCNC: 63 U/L (ref 35–104)
ALT SERPL-CCNC: 19 U/L (ref 5–33)
ANION GAP SERPL CALCULATED.3IONS-SCNC: 11 MMOL/L (ref 7–19)
AST SERPL-CCNC: 16 U/L (ref 5–32)
BASOPHILS ABSOLUTE: 0 K/UL (ref 0–0.2)
BASOPHILS RELATIVE PERCENT: 0.3 % (ref 0–1)
BILIRUB SERPL-MCNC: <0.2 MG/DL (ref 0.2–1.2)
BUN BLDV-MCNC: 12 MG/DL (ref 6–20)
CALCIUM SERPL-MCNC: 9.1 MG/DL (ref 8.6–10)
CHLORIDE BLD-SCNC: 104 MMOL/L (ref 98–111)
CO2: 28 MMOL/L (ref 22–29)
CREAT SERPL-MCNC: 0.7 MG/DL (ref 0.5–0.9)
EOSINOPHILS ABSOLUTE: 0.4 K/UL (ref 0–0.6)
EOSINOPHILS RELATIVE PERCENT: 5.9 % (ref 0–5)
GFR NON-AFRICAN AMERICAN: >60
GLUCOSE BLD-MCNC: 97 MG/DL (ref 74–109)
HCT VFR BLD CALC: 36.5 % (ref 37–47)
HEMOGLOBIN: 11.8 G/DL (ref 12–16)
LYMPHOCYTES ABSOLUTE: 2 K/UL (ref 1.1–4.5)
LYMPHOCYTES RELATIVE PERCENT: 34.3 % (ref 20–40)
MCH RBC QN AUTO: 32.7 PG (ref 27–31)
MCHC RBC AUTO-ENTMCNC: 32.3 G/DL (ref 33–37)
MCV RBC AUTO: 101.1 FL (ref 81–99)
MONOCYTES ABSOLUTE: 0.6 K/UL (ref 0–0.9)
MONOCYTES RELATIVE PERCENT: 9.8 % (ref 0–10)
NEUTROPHILS ABSOLUTE: 2.9 K/UL (ref 1.5–7.5)
NEUTROPHILS RELATIVE PERCENT: 49.4 % (ref 50–65)
PDW BLD-RTO: 13.7 % (ref 11.5–14.5)
PLATELET # BLD: 227 K/UL (ref 130–400)
PMV BLD AUTO: 10.2 FL (ref 9.4–12.3)
POTASSIUM SERPL-SCNC: 3.8 MMOL/L (ref 3.5–5)
PREALBUMIN: 21 MG/DL (ref 20–40)
RBC # BLD: 3.61 M/UL (ref 4.2–5.4)
SODIUM BLD-SCNC: 143 MMOL/L (ref 136–145)
T4 FREE: 0.9 NG/DL (ref 0.9–1.7)
TOTAL PROTEIN: 6.7 G/DL (ref 6.6–8.7)
TSH SERPL DL<=0.05 MIU/L-ACNC: 2.43 UIU/ML (ref 0.27–4.2)
VITAMIN B-12: 360 PG/ML (ref 211–946)
WBC # BLD: 5.9 K/UL (ref 4.8–10.8)

## 2018-01-06 PROCEDURE — 84134 ASSAY OF PREALBUMIN: CPT

## 2018-01-06 PROCEDURE — 85025 COMPLETE CBC W/AUTO DIFF WBC: CPT

## 2018-01-06 PROCEDURE — 1180000000 HC REHAB R&B

## 2018-01-06 PROCEDURE — 92522 EVALUATE SPEECH PRODUCTION: CPT

## 2018-01-06 PROCEDURE — 80053 COMPREHEN METABOLIC PANEL: CPT

## 2018-01-06 PROCEDURE — 97162 PT EVAL MOD COMPLEX 30 MIN: CPT

## 2018-01-06 PROCEDURE — 99223 1ST HOSP IP/OBS HIGH 75: CPT | Performed by: PSYCHIATRY & NEUROLOGY

## 2018-01-06 PROCEDURE — 97110 THERAPEUTIC EXERCISES: CPT

## 2018-01-06 PROCEDURE — 6360000002 HC RX W HCPCS: Performed by: PSYCHIATRY & NEUROLOGY

## 2018-01-06 PROCEDURE — 82607 VITAMIN B-12: CPT

## 2018-01-06 PROCEDURE — 97166 OT EVAL MOD COMPLEX 45 MIN: CPT

## 2018-01-06 PROCEDURE — 6370000000 HC RX 637 (ALT 250 FOR IP): Performed by: INTERNAL MEDICINE

## 2018-01-06 PROCEDURE — 84443 ASSAY THYROID STIM HORMONE: CPT

## 2018-01-06 PROCEDURE — 97116 GAIT TRAINING THERAPY: CPT

## 2018-01-06 PROCEDURE — 92610 EVALUATE SWALLOWING FUNCTION: CPT

## 2018-01-06 PROCEDURE — 36415 COLL VENOUS BLD VENIPUNCTURE: CPT

## 2018-01-06 PROCEDURE — 84439 ASSAY OF FREE THYROXINE: CPT

## 2018-01-06 PROCEDURE — 97535 SELF CARE MNGMENT TRAINING: CPT

## 2018-01-06 RX ADMIN — PRAVASTATIN SODIUM 40 MG: 20 TABLET ORAL at 20:44

## 2018-01-06 RX ADMIN — GABAPENTIN 300 MG: 300 CAPSULE ORAL at 08:08

## 2018-01-06 RX ADMIN — GABAPENTIN 300 MG: 300 CAPSULE ORAL at 20:44

## 2018-01-06 RX ADMIN — FLUTICASONE PROPIONATE 1 SPRAY: 50 SPRAY, METERED NASAL at 08:08

## 2018-01-06 RX ADMIN — OLANZAPINE 5 MG: 5 TABLET, FILM COATED ORAL at 20:44

## 2018-01-06 RX ADMIN — GABAPENTIN 300 MG: 300 CAPSULE ORAL at 14:06

## 2018-01-06 RX ADMIN — ENOXAPARIN SODIUM 40 MG: 40 INJECTION SUBCUTANEOUS at 20:44

## 2018-01-06 RX ADMIN — FLUOXETINE HYDROCHLORIDE 20 MG: 20 CAPSULE ORAL at 08:08

## 2018-01-06 RX ADMIN — ASPIRIN 81 MG: 81 TABLET, COATED ORAL at 08:08

## 2018-01-06 NOTE — PLAN OF CARE
[address] TREATMENT PLAN      Kevin Mcdonough    : 1965  [facility]  [facility]: [id number]   PHYSICIAN:  Becky Burk MD  Primary Problem    Patient Active Problem List   Diagnosis    Suicidal ideation    Major depressive disorder, recurrent, severe without psychotic features [address] [address]    Cerebrovascular accident (CVA) ([address]    Essential hypertension    Hemiplegia affecting non-dominant side, post-stroke ([address]    Gait abnormality    Ataxia    Numbness    Dysarthria    Lesion of basal ganglia    Cryptogenic stroke ([address]    Status post placement of implantable loop recorder    Cerebrovascular accident (CVA) determined by clinical assessment ([facility] [address]       Rehabilitation Diagnosis:     Acute ischemic stroke ([address] [I63.9]  Acute ischemic stroke ([facility] [address] [I63.9]       ADMIT DATE:2018   CARE PLAN     NURSING:  Kevin Mcdonough while on this unit will:     [x] Be continent of bowel and bladder      [x] Have an adequate number of bowel movements   [x] Urinate with no urinary retention >300ml in bladder   [] Complete bladder protocol with christianson removal   [] Maintain O2 SATs at __92_%   [x] Have pain managed while on ARU        [x] Be pain free by discharge    [x] Have no skin breakdown while on ARU   [] Have improved skin integrity via wound measurements   [] Have no signs/symptoms of infection at the wound site  [x] Be free from injury during hospitalization   [x] Complete education with patient/family with understanding demonstrated for:  [x] Adjustment   [] Other:   Nursing interventions may include bowel/bladder training, education for medical assistive devices, medication education, O2 saturation management, energy conservation, stress management techniques, fall prevention, alarms protocol, seating and positioning, skin/wound care, pressure relief instruction,dressing changes,  infection protection, DVT prophylaxis, and/or assistance with in room safety with transfers to bed, toilet, wheelchair, shower as well as bathroom activities and hygiene. Patient/caregiver education for:   [x] Disease/sustained injury/management      [x] Medication Use   [x] Surgical intervention   [x] Safety   [x] Body mechanics and or joint protection   [] Health maintenance         PHYSICAL THERAPY:  Goals:                  Short term goals  Time Frame for Short term goals: 1 to 2 weeks  Short term goal 1: transfer fim to 5  Short term goal 2: amb fim to 2  Short term goal 3: w/c fim to 4  Short term goal 5: min a hep            Long term goals  Time Frame for Long term goals : 2 to 3 weeks  Long term goal 1: transfer fim to 6  Long term goal 2: amb fim to 5  Long term goal 3: w/c fim to 5  Long term goal 4: steps fim to 5  Long term goal 5: indpe hep  These goals were reviewed with this patient at the time of assessment and Lazarus Offer is in agreement. Plan of Care: Pt to be seen 5 days per week for a minimum of 60 minutes.                 Current Treatment Recommendations: Strengthening, Balance Training, Functional Mobility Training, Transfer Training, Endurance Training, Wheelchair Mobility Training, Gait Training, Stair training, Pain Management, Home Exercise Program, Safety Education & Training, Patient/Caregiver Education & Training      OCCUPATIONAL THERAPY:  Goals:             Short term goals  Time Frame for Short term goals: 1 week  Short term goal 1: complete UB dressing with setup  Short term goal 2: complete LB dressing with min A  Short term goal 3: complete overall toileting with min A  Short term goal 4: complete L UE GM/FM tasks x 5 occasions to increase independence with ADLs  Short term goal 5: complete left visual scanning/attention tasks to increase awareness of left side of body  Short term goal 6: complete 1 hand static standing act for 3 mins with min A  Short term goal 7: complete ambulatory home making task with min A for balance :  Long term goals  Time Frame for Long term goals

## 2018-01-06 NOTE — PROGRESS NOTES
Physical Therapy EVALUATION Note  DATE:  2018  NAME:  Gabriela Vargas  :  1965  (52 y.o.,female)  MRN:  [id number]    HEIGHT:  Height: 5' 2\" (157.5 cm)  WEIGHT:  Weight: 173 lb 3.2 oz (78.6 kg)    PATIENT DIAGNOSIS(ES):    Diagnosis: STROKE WITH LEFT BODY INVOLVEMENT  RESTRICTIONS/PRECAUTIONS:    Restrictions/Precautions: Fall Risk, Swallowing - Thickened Liquids  Right Lower Extremity Weight Bearing: Weight Bearing As Tolerated  Left Lower Extremity Weight Bearing: Weight Bearing As Tolerated     OVERALL  ORIENTATION STATUS:     PAIN: NONE     NEUROLOGICAL              POSTURE     STRENGTH  Strength RLE  Strength RLE: Exception  Comment:  grossly  Strength LLE  Strength LLE: Exception  Comment:  grossly, with decreased control  ROM  AROM RLE (degrees)  RLE AROM: WFL  AROM LLE (degrees)  LLE AROM : WFL  BALANCE  Sitting - Static: Fair, +  Standing - Dynamic: Fair, -     ACTIVITY TOLERANCE  Activity Tolerance: Patient Tolerated treatment well     BED MOBILITY  Rolling: Stand by assistance  Supine to Sit: Stand by assistance  Sit to Supine: Contact guard assistance  TRANSFERS  Sit to Stand: Contact guard assistance  Bed to Chair: Minimal assistance     WHEELCHAIR PROPULSION  Level of Assistance: Minimal assistance, Moderate assistance  Distance: 150  Description/ Details: unabloe to  l hand, ineffective use of rle, constant veer to left  AMBULATION  Device: Rolling Walker  Assistance: Minimal assistance, Moderate assistance  Distance: 30  Quality of Gait: attempts to hop, amb too fast,   STAIRS   NOT ASSESSED           FIM SCORES    CURRENT  Bed, Chair, Wheel Chair: 4 - Requires steadying assistance only <25% assist  and/or requires assist with one leg only  Walk: 1 - Total Assistance Walks/operates wheelchair < 50 feet OR requires two or more people OR patient performs < 25% of locomotion effort  Wheel Chair: 3 - Moderate Assistance Requires up to Moderate Assistance to walk/operate wheelchair at least 150 feet  Stairs: 0 - Activity Does not Occur ( 0 only for the admission assessment)  ADMIT  Bed, Chair, Wheel Chair: 4 - Requires steadying assistance only <25% assist  and/or requires assist with one leg only   Walk: 1 - Total Assistance Walks/operates wheelchair < 50 feet OR requires two or more people OR patient performs < 25% of locomotion effort   Wheel Chair: 3 - Moderate Assistance Requires up to Moderate Assistance to walk/operate wheelchair at least 150 feet   Stairs: 0 - Activity Does not Occur ( 0 only for the admission assessment)      GOALS  GOAL: Bed, Chair, Wheel Chair: 6  GOAL: Walk/Wheel Chair: 5  GOAL: Stairs: 5     GOALS:  Short term goals  Time Frame for Short term goals: 1 to 2 weeks  Short term goal 1: transfer fim to 5  Short term goal 2: amb fim to 2  Short term goal 3: w/c fim to 4  Short term goal 5: min a hep    Long term goals  Time Frame for Long term goals : 2 to 3 weeks  Long term goal 1: transfer fim to 6  Long term goal 2: amb fim to 5  Long term goal 3: w/c fim to 5  Long term goal 4: steps fim to 5  Long term goal 5: indpe hep  HOME LIVING:  Lives With: Spouse  Type of Home: Homeless  Home Layout: One level  Home Access: Stairs to enter with rails  Entrance Stairs - Number of Steps: 3  Entrance Stairs - Rails: Both  ASSESSMENT (IMPAIRMENTS/BARRIERS):   Body structures, Functions, Activity limitations: Decreased functional mobility , Decreased strength, Decreased endurance, Decreased balance, Decreased coordination, Decreased safe awareness  Assessment: DECREASED STRENTH AND CONTROL OF L SIDE, IMPULSIVE, DECREASED BALANCE, ASSIST REQUIRED FOR TRANSFERS, AMB, AND W/C PROPULSION, STEPS NOT ATTEMPTED DUE TO SAFETY  Activity Tolerance: Patient Tolerated treatment well     EDUCATION:  Patient Education: INSTRUCTED IN 3 PT GAIT WITH RW     PLAN:  Plan  Times per week: 5 TO 6  Times per day: Twice a day  Plan weeks: 2 TO 3 WEEKS  Current Treatment Recommendations: Strengthening, Balance Training, Functional Mobility Training, Transfer Training, Endurance Training, Wheelchair Mobility Training, Gait Training, Stair training, Pain Management, Home Exercise Program, Safety Education & Training, Patient/Caregiver Education & Training  PATIENT REQUIRES AND IS REASONABLY EXPECTED TO ACTIVELY PARTICIPATE IN AT LEAST 3 HOURS OF INTENSIVE THERAPY PER DAY AT LEAST 5 DAYS PER WEEK, AND BE EXPECTED TO MAKE MEASURABLE IMPROVEMENT THAT WILL BE OF PRACTICAL VALUE TO IMPROVE THE PATIENT'S FUNCTIONAL CAPACITY OR ADAPTATION TO IMPAIRMENTS.    PATIENT GOAL FOR REHAB:  RETURN TO PRIOR LEVEL OF FUNCTION   DISCHARGE PLANS:  Discharge Recommendations: Home with Home health PT  ELOS:  2 TO 3 WEEKS        IRF/SHEA    ROLLING  Roll Left and Right  Assistance Needed: Supervision  Physical Assistance Level: No physical assistance  CARE Score: 4  SIT TO SUPINE  Sit to Lying  Assistance Needed: Incidental touching  Physical Assistance Level: Less than 25%  CARE Score: 3  SUPINE TO SIT  Lying to Sitting on Side of Bed  Assistance Needed: Supervision  Physical Assistance Level: No physical assistance  CARE Score: 4  SIT TO STAND  Sit to Stand  Assistance Needed: Incidental touching  Physical Assistance Level: Less than 25%  CARE Score: 3  TRANSFERS  Chair/Bed-to-Chair Transfer  Assistance Needed: Physical assistance  Physical Assistance Level: Less than 25%  CARE Score: 3  CAR TRANSFERS  Car Transfer  Reason if not Attempted: Safety concerns  CARE Score: 88  WALK 10 FT  Walk 10 Feet  Assistance Needed: Physical assistance  Physical Assistance Level: 25%-49%  CARE Score: 3  WALK 50 FT  Walk 50 Feet with Two Turns  Reason if not Attempted: Safety concerns  CARE Score: 88  Discharge Goal: Independent  WALK 150 FT  Walk 150 Feet  Reason if not Attempted: Safety concerns  CARE Score: 88  WALK 10 FT UNEVEN SURFACES  Walking 10 Feet on Uneven Surfaces  Reason if not Attempted: Safety concerns  CARE Score: 88  1 STEP  1 Step

## 2018-01-06 NOTE — H&P
[facility]   History and Physical        Patient:   Jaja Saldana  MR#:    [id number]  Account Number:                   [id number]      Room:    [address] [place] [address]-   YOB: 1965  Date of Progress Note: 1/6/2018  Time of Note                           9:08 AM  Attending Physician:  Joe Andersen MD        Admitting diagnosis:stroke/abnormal brain scan    Secondary diagnoses: Major depressive disorder, essential hypertension, gait abnormality, ataxia, dysarthria and anarthria, anesthesia skin, hemiplegia and hemiparesis following cerebral infarction affecting left dominant side, dysarthria following cerebral infarction, type II diabetes unspecified, nicotine dependence, anxiety, dysphasia following cerebral infarction    CHIEF COMPLAINT:  Stroke/abnormal brain scan       HISTORY OF PRESENT ILLNESS:   This 50 y.o. female who presented to [facility] ER on 1/1/8 with c/o left sided weakness & facial drooping,right-sided numbness,dizziness, dysarthria that had started about 1 week prior. CT of head done in ER revealed right frontal lobe hypodensities are most consistent with  nonhemorrhagic subacute ischemia of the right middle cerebral artery distribution. She was admitted for further treatment with consult for neurology. MRI of brain done revealed seeral areas of small acute inarction in the right hemisphere,as well as a 17x42 mm enhancing lesion on the right basal ganglia. She was seen by Neurologist Dr. Marv Reed who felt the infarcts were consistent with embolic process. Cardiology was consulted for evaluation WAQAR and loop recorder. She was seen by Dr. Mart Mendoza and taken for WAQAR and loop recorder placement on 1/4/18. She tolerated the procedure well. Neurosurgery was consulted to evaluate lesion. She was seen by Dr. Claudia Gomes.  Dr. Holland Ledesma believed that the findings of the MRI are consistent with an ischemic event followed by a small amount of hemorrhage that is resulting in cerebral edema, however, the possibility of an underlying mass is not entirely excluded. He will follow her very closely to watch the dynamics of the mass. He felt that a biopsy of a deep cerebral lesion that could possibly be a stroke poses more risk than benefit at this time and recommended repeat MRI brain in 4-6 weeks with an outpatient follow up. She continues to have left sided weakness,dysarthria and dysphasia. She was evaluated by SPT therapy and is currently on mechanical soft diet with nectar thick liquids. She is participating in PT/OT as well. She currently lives with her  at [facility]. She is felt to need a stay on Rehab for continue PT/OT/SPT before for she will be ready to return ther with her . She is now felt ready to start the Rehab program.    REVIEW OF SYSTEMS:  Constitutional - No fever or chills. No diaphoresis or significant fatigue. HENT -  No tinnitus or significant hearing loss. Eyes - no sudden vision change or eye pain  Respiratory - no significant shortness of breath or cough  Cardiovascular - no chest pain No palpitations or significant leg swelling  Gastrointestinal - no abdominal swelling or pain. Genitourinary - No difficulty urinating, dysuria  Musculoskeletal - no back pain or myalgia. Skin - no color change or rash  Neurologic - No seizures. No lateralizing weakness. Hematologic - no easy bruising or excessive bleeding. Psychiatric - no severe anxiety or nervousness. All other review of systems are negative.       Past Medical History:      Diagnosis Date    Anxiety     Cryptogenic stroke ([facility] [address]     Depression     Hypertension     Status post placement of implantable loop recorder 2018       Past Surgical History:      Procedure Laterality Date     SECTION      x2    CHOLECYSTECTOMY      COSMETIC SURGERY      HAND SURGERY         Medications in Hospital:      Current Facility-Administered Medications:     acetaminophen (TYLENOL) tablet 650 mg, 650 mg, Oral, Q4H PRN, Blayne Mancia DO    magnesium hydroxide (MILK OF MAGNESIA) 400 MG/5ML suspension 30 mL, 30 mL, Oral, Daily PRN, Marco Antonio Sommers DO    aspirin EC tablet 81 mg, 81 mg, Oral, Daily, Euclid Heaps, DO, 81 mg at 01/06/18 0808    FLUoxetine (PROZAC) capsule 20 mg, 20 mg, Oral, Daily, Blayne Mancia DO, 20 mg at 01/06/18 0808    fluticasone (FLONASE) 50 MCG/ACT nasal spray 1 spray, 1 spray, Nasal, Daily, Marco Antonio Sommers, DO, 1 spray at 01/06/18 0808    gabapentin (NEURONTIN) capsule 300 mg, 300 mg, Oral, TID, Blayne Mancia DO, 300 mg at 01/06/18 0808    hydrOXYzine (VISTARIL) capsule 25 mg, 25 mg, Oral, TID PRN, Blayne Mancia DO    OLANZapine [facility]) tablet 5 mg, 5 mg, Oral, Nightly, Blayne Mancia DO, 5 mg at 01/05/18 2130    pravastatin (PRAVACHOL) tablet 40 mg, 40 mg, Oral, Nightly, Blayne Mancia DO, 40 mg at 01/05/18 2130    docusate sodium (COLACE) capsule 100 mg, 100 mg, Oral, BID PRN, Sabra Hall MD    bisacodyl (DULCOLAX) suppository 10 mg, 10 mg, Rectal, Daily PRN, Sabra Hall MD    enoxaparin (LOVENOX) injection 40 mg, 40 mg, Subcutaneous, Nightly, Sabra Hall MD, 40 mg at 01/05/18 2130    Allergies:  Penicillins    Social History:   TOBACCO:   reports that she has been smoking. She has been smoking about 0.50 packs per day. She has never used smokeless tobacco.  ETOH:   reports that she drinks alcohol. Family History:       Problem Relation Age of Onset    Mental Illness Mother     Diabetes Mother     Cancer Mother     Mental Illness Father            Physical Exam:    Vitals: BP 95/64   Pulse 78   Temp 98.6 °F (37 °C) (Temporal)   Resp 20   Ht 5' 2\" (1.575 m)   Wt 174 lb 6.4 oz (79.1 kg)   SpO2 98%   BMI 31.90 kg/m²     Constitutional - well developed, well nourished.     Eyes - conjunctiva normal.  Pupils react to light  Ear, nose, throat -hearing intact to finger rub No scars, masses, or lesions over external nose or ears, no atrophy of hours.        Assessment and Plan     1. Stroke-on aspirin/statin-cardioembolic status post loop recorder/WAQAR  2. Abnormal brain scan- repeat MRI of the brain in 4-6 weeks  3. Mood disorder-on medications continue monitor  4. DVT prophylaxis-on Lovenox  5. Allergies Flonase  6. Hyperlipidemia-on statin  7. PT/OT/speech    Patient's functional assessment  Prior to hospitalization the patient was continent of bowel and bladder    Prior to admission to the hospital patient is independent of all function except ambulation where she was modified independent  No  Minimal assist contact guard-grooming, upper extremities dressing, bed mobility, supine to sit, sit to stand, transfer, toilet transfer, expression  Supervision-feeding  Moderate assistance test lower extremity dressing, bladder management, bowel management, ambulation  Independent test memory  Prior to admission to the rehab patient is employed 20 feet with a standard walker moderate assistance ×2 with unsteady assist with the patient's walker, consistent cues required for take a conservative sequencing assist with lateral weight shifts and decreased clearance on the left lower extremity    Functional assessment  All notes from reehab data were reviewed regarding the patient's functional status.     Current therapy  Requires PT, OT and/or speech therapy    Anticipated discharge approximately 16 days    Anticipated discharge setting  Home with home care    No clear barriers to discharge    The patient appears to be an appropriate candidate for inpatient rehabilitation    Sufficiently stable: Patient's condition is sufficiently stable at the time of admission to allow the patient to actively participate in an intensive rehabilitation program    Close medical supervision: A rehabilitation physician, or other licensed treating physician with specialized training and experience in inpatient rehabilitation, will conduct face-to-face visits with the patient a minimum of at least 3 days per week throughout the patient's stay    This patient requires close medical supervision for the active management of the ongoing conditions and potential complications stated in the admission note    Intensive rehabilitation nursing: The patient demonstrates the need for 24-hour rehabilitation nursing care for active management of the multiple medical issues documented in the admission note    Appropriate therapy needed: The patient requires the active and ongoing therapeutic intervention of at least 2 therapeutic disciplines, one of which must be physical or occupational therapy and/or speech therapy    Intensive therapy: The patient requires and is reasonably expected to actively participate in at least 3 hours of therapy per day at least 5 days per week, and expected to make measurable improvements that will be of practical value to improve the patient's functional capacity or adaptation to impairments. In addition, therapy treatments will begin within 36 hours from midnight of the day of the patient's admission to the inpatient rehabilitation facility    Expected duration and frequency therapy: 180 minutes per day, 5 days per week    Interdisciplinary team: The patient demonstrates the need for an interdisciplinary team for active management of the following medical issues including ataxia, motor planning, balance, disease management, elimination, endurance, family training, education, independent ADLs, pain management, precautions, range of motion, safety, strength, and transfers    I have reviewed the preadmission screening documents and concur with the findings. I believe the patient meets criteria and is sufficiently stable to allow participation in the program. This requires an intensive level of therapy, close medical supervision, and an interdisciplinary team approach provided through an individualized plan of care.  I approve admitting this patient for an intensive inpatient

## 2018-01-06 NOTE — PROGRESS NOTES
act)  Standing Balance  Sit to stand: Minimal assistance  Functional Mobility  Functional - Mobility Device: Rolling Walker  Activity: To/from bathroom  Assist Level: Moderate assistance  Functional Mobility Comments: Left knee elba, vc to slow down pace     Coordination  Movements Are Fluid And Coordinated: No  Coordination and Movement description: Ataxia; Left UE;Fine motor impairments;Decreased accuracy        Transfers  Sit to stand: Minimal assistance     Cognition  Overall Cognitive Status: WFL  Perception  Overall Perceptual Status: Impaired  Unilateral Attention: Cues to attend to left side of body     01/06/18 1400   Self-Care   Eating 5   GOAL: Eating 7   Grooming 5   GOAL: Grooming 7   Bathing 3   GOAL: Bathing 6   Dressing-Upper 4  (assistance with left sleeve)   GOAL: Dressing-Upper 7   Dressing-Lower 2   GOAL: Dressing-Lower 6   Toileting 2  (assistance with pants up/down)   GOAL: Toileting 6   Transfers   Toilet Transfer 3  (w/c to toilet, GB)   GOAL: Toilet 6   Primary Mode Shower   GOAL: Tub, Shower 6   Shower Transfer 0   Communication   Comprehension 5   GOAL: Comprehension 6   Expression 7   GOAL: Expression 7   Social Cognition   Social Interaction 6   GOAL: Social Interaction 7   Problem Solving 5   GOAL: Problem Solving 6   Memory 4   GOAL: Memory 6       LUE PROM (degrees)  LUE PROM: WNL  LUE AROM (degrees)  LUE AROM : WFL  LUE General AROM: UE ataxia  RUE PROM (degrees)  RUE PROM: WNL  RUE AROM (degrees)  RUE AROM : WNL  LUE Strength  LUE Strength Comment: 4/5 proximal strength, 4-/5 distal strength  RUE Strength  Gross RUE Strength: WNL    Assessment   Performance deficits / Impairments: Decreased functional mobility ; Decreased high-level IADLs;Decreased ADL status; Decreased fine motor control;Decreased coordination;Decreased strength;Decreased balance;Decreased safe awareness  Assessment: would benefit from further rehab to increase independence, safety, and functional mobility.  Has independence       Therapy Time   Individual Concurrent Group Co-treatment   Time In 1400         Time Out 1500         Minutes 60                   Electronically signed by Maria Luz Messina OT on 1/6/2018 at 5:09 PM

## 2018-01-06 NOTE — PROGRESS NOTES
movements were noted     Attention/ Orientation/ Memory:    Attention/concentration: Decreased sustained attention was noted during assessment; patient redirected to questions without adverse behaviors noted. Do feel this was related to lethargy. Orientation: 20% impaired, limited, or restricted-Just temporal information of KAT and date; patient was off by 1 on each question. Immediate Memory: 10% impaired, limited, or restricted  Delayed Memory: 0% impaired, limited, or restricted; patient was 8/8 with 20 minute delay+distractors present    Higher Cognitive:  Organizational and Sequencing Skills: 0% impaired, limited, or restricted  Categorization: 0% impaired, limited, or restricted  Problem Solving and Reasonin% impaired, limited, or restricted      Swallowing:   Completed swallow evaluation. Patient exhibited strong cough for swallow airway protection. Oral transit of puree consistency trials, presented independently, primarily measured 1 second in length and no oral cavity residue was noted post swallows. Patient exhibited decreased vertical and rotary jaw movement, particularly on the left side of the mouth, during oral prep of mechanical soft consistency trials presented independently. Oral transit of mechanical soft consistency primarily measured 1-2 seconds in length and min-moderate oral cavity residue was observed, particularly in the left sulci, post swallows. Mechanical soft consistency residue did clear with lingual sweeps and additional swallows. Oral transit of nectar thick liquid trials, presented independently via cup, primarily measured 1 second in length. Patient exhibited inconsistently fast oral transit of thin H2O trials presented independently via cup. Laryngeal elevation during swallow initiation was considered to be sluggish, but strong, in movement.  No outward S/S penetration/aspiration was noted with any puree consistency trial, mechanical soft consistency trial, or nectar thick

## 2018-01-07 LAB
BACTERIA: NEGATIVE /HPF
BILIRUBIN URINE: NEGATIVE
BLOOD, URINE: NEGATIVE
CLARITY: CLEAR
COLOR: YELLOW
EPITHELIAL CELLS, UA: 5 /HPF (ref 0–5)
FACTOR V LEIDEN: NEGATIVE
GLUCOSE URINE: NEGATIVE MG/DL
HYALINE CASTS: 4 /HPF (ref 0–8)
KETONES, URINE: NEGATIVE MG/DL
LEUKOCYTE ESTERASE, URINE: ABNORMAL
NITRITE, URINE: NEGATIVE
PH UA: 6
PROTEIN UA: NEGATIVE MG/DL
RBC UA: 2 /HPF (ref 0–4)
SPECIFIC GRAVITY UA: 1.01
SPECIMEN: NORMAL
UROBILINOGEN, URINE: 0.2 E.U./DL
WBC UA: 4 /HPF (ref 0–5)

## 2018-01-07 PROCEDURE — 6370000000 HC RX 637 (ALT 250 FOR IP): Performed by: INTERNAL MEDICINE

## 2018-01-07 PROCEDURE — 99232 SBSQ HOSP IP/OBS MODERATE 35: CPT | Performed by: PSYCHIATRY & NEUROLOGY

## 2018-01-07 PROCEDURE — 87086 URINE CULTURE/COLONY COUNT: CPT

## 2018-01-07 PROCEDURE — 6360000002 HC RX W HCPCS: Performed by: PSYCHIATRY & NEUROLOGY

## 2018-01-07 PROCEDURE — 1180000000 HC REHAB R&B

## 2018-01-07 PROCEDURE — 81001 URINALYSIS AUTO W/SCOPE: CPT

## 2018-01-07 RX ADMIN — GABAPENTIN 300 MG: 300 CAPSULE ORAL at 21:31

## 2018-01-07 RX ADMIN — PRAVASTATIN SODIUM 40 MG: 20 TABLET ORAL at 21:31

## 2018-01-07 RX ADMIN — FLUOXETINE HYDROCHLORIDE 20 MG: 20 CAPSULE ORAL at 08:12

## 2018-01-07 RX ADMIN — FLUTICASONE PROPIONATE 1 SPRAY: 50 SPRAY, METERED NASAL at 08:12

## 2018-01-07 RX ADMIN — ENOXAPARIN SODIUM 40 MG: 40 INJECTION SUBCUTANEOUS at 21:30

## 2018-01-07 RX ADMIN — OLANZAPINE 5 MG: 5 TABLET, FILM COATED ORAL at 21:31

## 2018-01-07 RX ADMIN — ASPIRIN 81 MG: 81 TABLET, COATED ORAL at 08:12

## 2018-01-07 RX ADMIN — GABAPENTIN 300 MG: 300 CAPSULE ORAL at 08:12

## 2018-01-07 RX ADMIN — GABAPENTIN 300 MG: 300 CAPSULE ORAL at 13:56

## 2018-01-07 NOTE — PROGRESS NOTES
participating in PT/OT as well. She currently lives with her  at [facility]. She is felt to need a stay on Rehab for continue PT/OT/SPT before for she will be ready to return ther with her .  She is now felt ready to start the Rehab program.    REVIEW OF SYSTEMS:  Constitutional: No fevers No chills  Neck:No stiffness  Respiratory: No shortness of breath  Cardiovascular: No chest pain No palpitations  Gastrointestinal: No abdominal pain    Genitourinary: No Dysuria  Neurological: No headache, no confusion    Past Medical History:      Diagnosis Date    Anxiety     Cryptogenic stroke ([facility] [address]     Depression     Hypertension     Status post placement of implantable loop recorder 2018       Past Surgical History:      Procedure Laterality Date     SECTION      x2    CHOLECYSTECTOMY      COSMETIC SURGERY      HAND SURGERY         Medications in Hospital:      Current Facility-Administered Medications:     acetaminophen (TYLENOL) tablet 650 mg, 650 mg, Oral, Q4H PRN, Dayna Mancia DO    magnesium hydroxide (MILK OF MAGNESIA) 400 MG/5ML suspension 30 mL, 30 mL, Oral, Daily PRN, Ania Flores DO    aspirin EC tablet 81 mg, 81 mg, Oral, Daily, Dayna Mancia DO, 81 mg at 18 7255    FLUoxetine (PROZAC) capsule 20 mg, 20 mg, Oral, Daily, Dayna Mancia DO, 20 mg at 18 6985    fluticasone (FLONASE) 50 MCG/ACT nasal spray 1 spray, 1 spray, Nasal, Daily, Dayna Mancia DO, 1 spray at 18 6345    gabapentin (NEURONTIN) capsule 300 mg, 300 mg, Oral, TID, Dayna Mancia DO, 300 mg at 18 7888    hydrOXYzine (VISTARIL) capsule 25 mg, 25 mg, Oral, TID PRN, Dayna Mancia DO    OLANZapine (ZYPREXA) tablet 5 mg, 5 mg, Oral, Nightly, Dayna Mancia DO, 5 mg at 18    pravastatin (PRAVACHOL) tablet 40 mg, 40 mg, Oral, Nightly, Dayna Mancia DO, 40 mg at 18    docusate sodium (COLACE) capsule 100 mg, 100 mg, Gait                  Not tested           Nursing/pcp notes, imaging,labs and vitals reviewed. PT,OT and/or speech notes reviewed    Lab Results   Component Value Date    WBC 5.9 01/06/2018    HGB 11.8 (L) 01/06/2018    HCT 36.5 (L) 01/06/2018    .1 (H) 01/06/2018     01/06/2018     Lab Results   Component Value Date     01/06/2018    K 3.8 01/06/2018     01/06/2018    CO2 28 01/06/2018    BUN 12 01/06/2018    CREATININE 0.7 01/06/2018    GLUCOSE 97 01/06/2018    CALCIUM 9.1 01/06/2018    PROT 6.7 01/06/2018    LABALBU 3.7 01/06/2018    BILITOT <0.2 01/06/2018    ALKPHOS 63 01/06/2018    AST 16 01/06/2018    ALT 19 01/06/2018    LABGLOM >60 01/06/2018    GLOB 3.0 11/12/2016     Lab Results   Component Value Date    INR 1.03 01/01/2018    PROTIME 13.4 01/01/2018             RECORD REVIEW: Previous medical records, medications were reviewed at today's visit    IMPRESSION:   1. Stroke-on aspirin/statin-cardioembolic status post loop recorder/WAQAR  2. Abnormal brain scan- repeat MRI of the brain in 4-6 weeks  3. Mood disorder-on medications continue monitor  4. DVT prophylaxis-on Lovenox  5. Allergies Flonase  6. Hyperlipidemia-on statin  7.  PT/OT/speech      CALL WITH ANY QUESTIONS  [phone] CELL  Dr Daysi Jacobsen

## 2018-01-08 LAB
ANTIPHOSPHOLIPID AB IGG: 3 GPL (ref 0–14)
ANTIPHOSPHOLIPID AB IGM: 2 MPL (ref 0–14)
BASOPHILS ABSOLUTE: 0 K/UL (ref 0–0.2)
BASOPHILS RELATIVE PERCENT: 0.5 % (ref 0–1)
EOSINOPHILS ABSOLUTE: 0.4 K/UL (ref 0–0.6)
EOSINOPHILS RELATIVE PERCENT: 5.8 % (ref 0–5)
HCT VFR BLD CALC: 38.7 % (ref 37–47)
HEMOGLOBIN: 12.7 G/DL (ref 12–16)
LYMPHOCYTES ABSOLUTE: 2.5 K/UL (ref 1.1–4.5)
LYMPHOCYTES RELATIVE PERCENT: 40.6 % (ref 20–40)
MCH RBC QN AUTO: 32.5 PG (ref 27–31)
MCHC RBC AUTO-ENTMCNC: 32.8 G/DL (ref 33–37)
MCV RBC AUTO: 99 FL (ref 81–99)
MONOCYTES ABSOLUTE: 0.5 K/UL (ref 0–0.9)
MONOCYTES RELATIVE PERCENT: 8.1 % (ref 0–10)
NEUTROPHILS ABSOLUTE: 2.7 K/UL (ref 1.5–7.5)
NEUTROPHILS RELATIVE PERCENT: 44.8 % (ref 50–65)
PDW BLD-RTO: 14.1 % (ref 11.5–14.5)
PLATELET # BLD: 239 K/UL (ref 130–400)
PMV BLD AUTO: 10.1 FL (ref 9.4–12.3)
RBC # BLD: 3.91 M/UL (ref 4.2–5.4)
WBC # BLD: 6.1 K/UL (ref 4.8–10.8)

## 2018-01-08 PROCEDURE — 92507 TX SP LANG VOICE COMM INDIV: CPT

## 2018-01-08 PROCEDURE — 85025 COMPLETE CBC W/AUTO DIFF WBC: CPT

## 2018-01-08 PROCEDURE — 6370000000 HC RX 637 (ALT 250 FOR IP): Performed by: INTERNAL MEDICINE

## 2018-01-08 PROCEDURE — 97110 THERAPEUTIC EXERCISES: CPT

## 2018-01-08 PROCEDURE — 6360000002 HC RX W HCPCS: Performed by: PSYCHIATRY & NEUROLOGY

## 2018-01-08 PROCEDURE — 36415 COLL VENOUS BLD VENIPUNCTURE: CPT

## 2018-01-08 PROCEDURE — 99232 SBSQ HOSP IP/OBS MODERATE 35: CPT | Performed by: PSYCHIATRY & NEUROLOGY

## 2018-01-08 PROCEDURE — 92526 ORAL FUNCTION THERAPY: CPT

## 2018-01-08 PROCEDURE — 97116 GAIT TRAINING THERAPY: CPT

## 2018-01-08 PROCEDURE — 6370000000 HC RX 637 (ALT 250 FOR IP): Performed by: PSYCHIATRY & NEUROLOGY

## 2018-01-08 PROCEDURE — 97535 SELF CARE MNGMENT TRAINING: CPT

## 2018-01-08 PROCEDURE — 1180000000 HC REHAB R&B

## 2018-01-08 RX ADMIN — FLUOXETINE HYDROCHLORIDE 20 MG: 20 CAPSULE ORAL at 08:33

## 2018-01-08 RX ADMIN — ASPIRIN 81 MG: 81 TABLET, COATED ORAL at 08:33

## 2018-01-08 RX ADMIN — GABAPENTIN 300 MG: 300 CAPSULE ORAL at 08:33

## 2018-01-08 RX ADMIN — GABAPENTIN 300 MG: 300 CAPSULE ORAL at 14:01

## 2018-01-08 RX ADMIN — OLANZAPINE 5 MG: 5 TABLET, FILM COATED ORAL at 20:58

## 2018-01-08 RX ADMIN — MAGNESIUM HYDROXIDE 30 ML: 400 SUSPENSION ORAL at 20:58

## 2018-01-08 RX ADMIN — ENOXAPARIN SODIUM 40 MG: 40 INJECTION SUBCUTANEOUS at 20:58

## 2018-01-08 RX ADMIN — DOCUSATE SODIUM 100 MG: 100 CAPSULE, LIQUID FILLED ORAL at 20:58

## 2018-01-08 RX ADMIN — PRAVASTATIN SODIUM 40 MG: 20 TABLET ORAL at 20:58

## 2018-01-08 RX ADMIN — FLUTICASONE PROPIONATE 1 SPRAY: 50 SPRAY, METERED NASAL at 08:32

## 2018-01-08 RX ADMIN — GABAPENTIN 300 MG: 300 CAPSULE ORAL at 20:58

## 2018-01-08 NOTE — PROGRESS NOTES
Nutrition Assessment    Type and Reason for Visit: Initial (rehab)    Nutrition Recommendations: continue POC, follow for diet progression and tolerance    Malnutrition Assessment:  · Malnutrition Status: No malnutrition    Nutrition Diagnosis:   · Problem: No nutrition diagnosis at this time    Nutrition Assessment:  · Subjective Assessment: Pt resting after lunch, reports good appetite, tolerating diet. No needs. · Nutrition-Focused Physical Findings:    · Wound Type: None  · Current Nutrition Therapies:  · Oral Diet Orders: Dysphagia 2, Nectar Thick   · Oral Diet intake: %  · Oral Nutrition Supplement (ONS) Orders: None  · Anthropometric Measures:  · Ht: 5' 2\" (157.5 cm)   · Current Body Wt: 173 lb (78.5 kg)  · Admission Body Wt: 174 lb (78.9 kg)  · Usual Body Wt:    · % Weight Change:  ,     · Ideal Body Wt: 110 lb (49.9 kg), % Ideal Body    · BMI Classification: BMI 30.0 - 34.9 Obese Class I    Estimated Intake vs Estimated Needs: Intake Meets Needs    Nutrition Risk Level: Low    Nutrition Interventions:   Continue current diet  Continued Inpatient Monitoring    Nutrition Evaluation:   · Evaluation: Goals set   · Goals: po 50% or more    · Monitoring: Meal Intake, Weight, Pertinent Labs, Chewing/Swallowing, Diet Progression, Diet Tolerance    See Adult Nutrition Doc Flowsheet for more detail.      Electronically signed by Miranda Montero, MS, RD, LD on 1/8/18 at 1:40 PM    Contact Number: [phone]

## 2018-01-08 NOTE — PATIENT CARE CONFERENCE
[facility] ACUTE INPATIENT REHABILITATION  TEAM CONFERENCE NOTE    Date: 2018  Patient Name: Katt Limon        MRN: [id number]    : 1965  (50 y.o.)  Gender: female      Diagnosis: STROKE WITH LEFT BODY INVOLVEMENT      PHYSICAL THERAPY  STRENGTH  Strength RLE  Strength RLE: Exception  Comment: 5/5 grossly  Strength LLE  Strength LLE: Exception  Comment: 4/5 grossly, with decreased control  ROM  AROM RLE (degrees)  RLE AROM: WFL  AROM LLE (degrees)  LLE AROM : WFL  BED MOBILITY  Rolling: Stand by assistance  Supine to Sit: Stand by assistance  Sit to Supine: Stand by assistance  TRANSFERS  Sit to Stand: Contact guard assistance  Bed to Chair: Contact guard assistance     WHEELCHAIR PROPULSION  Level of Assistance: Minimal assistance, Moderate assistance  Distance: 15 FT  Description/ Details: unabloe to  l hand, ineffective use of rle, constant veer to left  AMBULATION  Device: Rolling Walker  Assistance: Minimal assistance  Distance: 25 FT  Quality of Gait: INITIALLY RECPIROCAL GAIT, DECREASED LEFT STEP LENGTH, EXCESSIVE RIGHT. VCS FOR STEP TO GAIT. THEN EXCESSIVE LEFT STEP, POST LOB WITH RIGHT STEP TO. AMB D/C'D DUE TO C/O DIZZINESS.    STAIRS              FIM SCORES  Bed, Chair, Wheel Chair: 4 - Requires steadying assistance only <25% assist  and/or requires assist with one leg only  Walk: 1 - Total Assistance Walks/operates wheelchair < 50 feet OR requires two or more people OR patient performs < 25% of locomotion effort  Wheel Chair: 3 - Moderate Assistance Requires up to Moderate Assistance to walk/operate wheelchair at least 150 feet  Stairs: 0 - Activity Does not Occur ( 0 only for the admission assessment)  GOALS:  Short term goals  Time Frame for Short term goals: 1 to 2 weeks  Short term goal 1: transfer fim to 5  Short term goal 2: amb fim to 2  Short term goal 3: w/c fim to 4  Short term goal 5: min a hep    Long term goals  Time Frame for Long term goals : 2 to 3 weeks  Long Eatin - Feeds self with setup/supervision/cues and/or requires only setup/supervision/cues to perform tube feedings GOAL: Eatin   GROOMING Groomin - Requires setup/cues to do all tasks Groomin - Requires setup/cues to do all tasks GOAL: Groomin   BATHING Bathing: 3 - Able to bathe 5-7 areas Bathing: 3 - Able to bathe 5-7 areas GOAL: Bathin   UPPER EXTREMITY DRESSING Dressing-Upper: 4 - Requires assist with buttons/zippers only and/or requires assist with one arm only (assistance with left sleeve) Dressing-Upper: 4 - Requires assist with buttons/zippers only and/or requires assist with one arm only (assistance with left sleeve) GOAL: Dressing-Upper: 7   LOWER EXTREMITY DRESSING Dressing-Lower: 2 - Requires assist with 4-5 parts of dressing Dressing-Lower: 2 - Requires assist with 4-5 parts of dressing GOAL: Dressing-Lower: 6   TOILETING Toiletin - Able to perform 1 task only (e.g. hygiene) (assistance with pants up/down) Toiletin - Able to perform 1 task only (e.g. hygiene) (assistance with pants up/down) GOAL: Toiletin   TOILET TRANSFER Toilet Transfer: 3 - Requires 25-49% assist getting off toilet (w/c to toilet, GB) Toilet Transfer: 3 - Requires 25-49% assist getting off toilet (w/c to toilet, GB) GOAL: Toilet: 6   TUB/SHOWER TRANSFER Primary Mode: Shower     Shower Transfer: 0 - Activity does not occur   Primary Mode: Shower     Shower Transfer: 0 - Activity does not occur     Primary Mode: Shower  GOAL: Tub, Shower: 6         Cognition:  Comprehension: 5 - Patient understands basic needs (hungry/hot/pain)  Expression: 7 - Patient expresses complex ideas/needs  Social Interaction: 6 - Patient requires medication for mood and/or effect  Problem Solvin - Patient able to solve simple/routine tasks  Memory: 4 - Patient remembers 75-90%+ of the time    UE Functioning:  L UE ataxia, 4/5 proximal strength, 4-/5 distal strength (L inattention)  R WNL     Pain Assessment:   None reported during tx       STGs:  Short term goals  Time Frame for Short term goals: 1 week  Short term goal 1: complete UB dressing with setup  Short term goal 2: complete LB dressing with min A  Short term goal 3: complete overall toileting with min A  Short term goal 4: complete L UE GM/FM tasks x 5 occasions to increase independence with ADLs  Short term goal 5: complete left visual scanning/attention tasks to increase awareness of left side of body  Short term goal 6: complete 1 hand static standing act for 3 mins with min A  Short term goal 7: complete ambulatory home making task with min A for balance    LTGs:  Long term goals  Time Frame for Long term goals : 3 weeks  Long term goal 1: complete ambulatory home making task with modified independence`  Long term goal 2: complete overall toileting with modified independence  Long term goal 3: complete overall dressing with modified independence  Long term goal 4: complete HEP with independence  Long term goal 5: complete overall bathing with modified independence  Long term goals 6: verbalize DME    Assessment:  Decreased functional mobility ; Decreased high-level IADLs; Decreased ADL status; Decreased fine motor control; Decreased coordination; Decreased strength; Decreased balance; Decreased safe awareness  would benefit from further rehab to increase independence, safety, and functional mobility. Has left body inattention that affects ADLs        NUTRITION  Current Wt: Weight: 173 lb 3.2 oz (78.6 kg) / Body mass index is 31.68 kg/m². Admission Wt: Admission Body Weight: 174 lb (78.9 kg)  Oral Diet Orders: Dysphagia 2, Bogard Thick   Oral Nutrition Supplement (ONS) Orders: None   PO %  Please see nutrition note for details.       NURSING    FIMS:  Bladder  Level of Assistance: Bladder Level of Assistance: 3- Moderate Assistance (Subject equal 50% or more)  Frequency of Accidents: Bladder Frequency of Accidents: 6 - No accidents: uses device    Bowel  Level of Assistance: Bowel Level of Assistance: 3- Moderate Assistance (Subject equal 50% or more)  Frequency of Accidents: Bowel Frequency of Accidents: 6 - No accidents: uses device  Wounds/Incisions/Ulcers: No skin issues identified     Ronaldo Scale Score: 17     Pain: No pain concerns to address     Consultations/Labs/X-rays: Dr. Sondra Mckeon, Dr. Maximo Jansent: CT-Scan 1-3, Chest XR 1-1, MRI 1-2 , EKG 1-1, ECHO 1-2, VL Carotid bilateral     Family Education: Family available and participating in education      Fall Risk:  Dunaway Score: 80     Fall in the last week? none        Other Nursing Issues: Alert and oriented x3, cont of bowel and bladder, meds crushed in applesauce, BM 1-7, Left sided weakness,Left facial droop, left foot drag,  Loop recorder in place 1-4-18,  at bedside    SOCIAL WORK/CASE MANAGEMENT  Assessment:    Discharge Plan   Estimated Length of Stay: 2 weeks  Destination:  continued therapy at appropriate setting    Pass: No    Services at Discharge: Other -continue physical and occupational therapy    Equipment at Discharge: wheelchair, walker, bath bench and bedside commode    Progress made in the prior week: 1. NO PRIOR STAFFING  2.  3.  4.  5.      Goals for following week:  1. SUPERVISION FOR TRANSFERS  2. SUPERVISION FOR MRADLs   3.   4.   5.     Factors facilitating achievement of predicted outcomes: Motivated and Cooperative    Barriers to the achievement of predicted outcomes: financial limitations    Team Members Present at Conference:  : Shirley Adler   Occupational Therapist: Ritchie Nicole, OTR/L  Physical Therapist: Radha Ricardo PT,DPT  Speech Therapist: Nicole Rodriguez [address], [address]  Nurse: Tres Aguillon, RN   Nurse Manager:  Tres Aguillon, RN  Dietitian:  Penelope Smith MS, RD, LD  Rehab Director:  Luisa Brambila approve the established interdisciplinary plan of care as documented within the medical record of Robert Balderrama

## 2018-01-09 LAB — URINE CULTURE, ROUTINE: NORMAL

## 2018-01-09 PROCEDURE — 92507 TX SP LANG VOICE COMM INDIV: CPT

## 2018-01-09 PROCEDURE — 97116 GAIT TRAINING THERAPY: CPT

## 2018-01-09 PROCEDURE — 92526 ORAL FUNCTION THERAPY: CPT

## 2018-01-09 PROCEDURE — 99233 SBSQ HOSP IP/OBS HIGH 50: CPT | Performed by: PSYCHIATRY & NEUROLOGY

## 2018-01-09 PROCEDURE — 97110 THERAPEUTIC EXERCISES: CPT

## 2018-01-09 PROCEDURE — 1180000000 HC REHAB R&B

## 2018-01-09 PROCEDURE — 6370000000 HC RX 637 (ALT 250 FOR IP): Performed by: INTERNAL MEDICINE

## 2018-01-09 PROCEDURE — 97530 THERAPEUTIC ACTIVITIES: CPT

## 2018-01-09 PROCEDURE — 6360000002 HC RX W HCPCS: Performed by: PSYCHIATRY & NEUROLOGY

## 2018-01-09 RX ADMIN — GABAPENTIN 300 MG: 300 CAPSULE ORAL at 07:39

## 2018-01-09 RX ADMIN — FLUOXETINE HYDROCHLORIDE 20 MG: 20 CAPSULE ORAL at 07:39

## 2018-01-09 RX ADMIN — GABAPENTIN 300 MG: 300 CAPSULE ORAL at 16:21

## 2018-01-09 RX ADMIN — OLANZAPINE 5 MG: 5 TABLET, FILM COATED ORAL at 20:45

## 2018-01-09 RX ADMIN — ASPIRIN 81 MG: 81 TABLET, COATED ORAL at 07:39

## 2018-01-09 RX ADMIN — FLUTICASONE PROPIONATE 1 SPRAY: 50 SPRAY, METERED NASAL at 07:38

## 2018-01-09 RX ADMIN — GABAPENTIN 300 MG: 300 CAPSULE ORAL at 20:45

## 2018-01-09 RX ADMIN — PRAVASTATIN SODIUM 40 MG: 20 TABLET ORAL at 20:44

## 2018-01-09 RX ADMIN — ENOXAPARIN SODIUM 40 MG: 40 INJECTION SUBCUTANEOUS at 20:45

## 2018-01-09 NOTE — PROGRESS NOTES
Patient:   Jillian Tapia  MR#:    [id number]   Room:    0812/812-01   YOB: 1965  Date of Progress Note: 1/9/2018  Time of Note                           8:06 AM  Consulting Physician:   Daysi Jacobsen M.D. Attending Physician:  Daysi Jacobsen MD     Chief complaint stroke/abnormal brain scan    S:This 50 y.o. female  who presented to [facility] ER on 1/1/8 with c/o left sided weakness & facial drooping,right-sided numbness,dizziness, dysarthria that had started about 1 week prior. CT of head done in ER revealed right frontal lobe hypodensities are most consistent with  nonhemorrhagic subacute ischemia of the right middle cerebral artery distribution. She was admitted for further treatment with consult for neurology. MRI of brain done revealed seeral areas of small acute inarction in the right hemisphere,as well as a 17x42 mm enhancing lesion on the right basal ganglia. She was seen by Neurologist Dr. Rebekah Singer who felt the infarcts were consistent with embolic process. Cardiology was consulted for evaluation WAQAR and loop recorder. She was seen by Dr. Real Scales and taken for WAQAR and loop recorder placement on 1/4/18. She tolerated the procedure well. Neurosurgery was consulted to evaluate lesion. She was seen by Dr. Jamal Mittal. Dr. Harriett Ramos believed that the findings of the MRI are consistent with an ischemic event followed by a small amount of hemorrhage that is resulting in cerebral edema, however, the possibility of an underlying mass is not entirely excluded. He will follow her very closely to watch the dynamics of the mass. He felt that a biopsy of a deep cerebral lesion that could possibly be a stroke poses more risk than benefit at this time and recommended repeat MRI brain in 4-6 weeks with an outpatient follow up. She continues to have left sided weakness,dysarthria and dysphasia. She was evaluated by SPT therapy and is currently on mechanical soft diet with nectar thick liquids.  She is Tremor None present     Gait                  Not tested           Nursing/pcp notes, imaging,labs and vitals reviewed. PT,OT and/or speech notes reviewed    Lab Results   Component Value Date    WBC 6.1 01/08/2018    HGB 12.7 01/08/2018    HCT 38.7 01/08/2018    MCV 99.0 01/08/2018     01/08/2018     Lab Results   Component Value Date     01/06/2018    K 3.8 01/06/2018     01/06/2018    CO2 28 01/06/2018    BUN 12 01/06/2018    CREATININE 0.7 01/06/2018    GLUCOSE 97 01/06/2018    CALCIUM 9.1 01/06/2018    PROT 6.7 01/06/2018    LABALBU 3.7 01/06/2018    BILITOT <0.2 01/06/2018    ALKPHOS 63 01/06/2018    AST 16 01/06/2018    ALT 19 01/06/2018    LABGLOM >60 01/06/2018    GLOB 3.0 11/12/2016     Lab Results   Component Value Date    INR 1.03 01/01/2018    PROTIME 13.4 01/01/2018             RECORD REVIEW: Previous medical records, medications were reviewed at today's visit    IMPRESSION:   1. Stroke-on aspirin/statin-cardioembolic status post loop recorder/WAQAR  2. Abnormal brain scan- repeat MRI of the brain in 4-6 weeks  3. Mood disorder-on medications continue monitor  4. DVT prophylaxis-on Lovenox  5. Allergies Flonase  6. Hyperlipidemia-on statin  7.  PT/OT/speech    Team conference with PT/OT/speech/nursing/Care coordinator to review in depth patients care and discharge planning    WC 15 ft mid to mod assist  Ambulation 25 ft RW mid assist  0 steps  Impulsive    ELOS pending    [address] CELL  Dr Shade Mcghee

## 2018-01-09 NOTE — PROGRESS NOTES
[facility]  INPATIENT SPEECH THERAPY  [facility] 8 REHAB UNIT  TIME   Time In: 1100  Time Out: 1200  Minutes: 60       [x]Daily Note  []Progress Note    Date: 2018  Patient Name: Jennifer Canchola        MRN: [id number]    Account #: [de-identified]  : 1965  (50 y.o.)  Gender: female   Primary Provider: Omer Huffman MD  Precautions: SWALLOW PRECAUTIONS, NECTAR THICK LIQUID    Swallowing Status/Diet: PATIENT PARTICIPATED IN [facility] DINING AND PRESENTED DYSPHAGIA II WITH NECTAR THICK LIQUID. PATIENT DEMONSTRATED RAPID INTAKE AND OVER FILLING OF MOUTH. PATIENT DEMONSTRATED POCKETING AND RESIDUE ON LEFT SIDE OF CHEEK. REQUIRED MODERATE CUES TO SLOW INTAKE AND CHECK LEFT SIDE OF MOUTH FOR POCKETING. PATIENT TOLERATED NECTAR THICK LIQUID WITH MILD ORAL LEAKAGE ON LEFT CORNER OF MOUTH.  ALSO RESIDUE OF FOOD OUTSIDE OF MOUTH ON LEFT, REQUIRED CUING TO WIPE MOUTH. PATIENT DEMONSTRATED MILD COUGH WITH WATERY EYES AT END OF MEAL. PATIENT TO CONTINUE WITH CURRENT DIET OF DYSPHAGIA II WITH NECTAR THICK LIQUID. ORAL CARE PROVIDED AFTER MEAL AND REMOVED VERY SMALL PIECES OF FOOD FROM LEFT SIDE OF MOUTH. Subjective: PATIENT COMPLETED PHARYNGEAL EXERCISES INCLUDING EFFORTFUL SWALLOW AND GRACE MANEUVER,  WITH MODERATE CUES, DIFFICULTY WITH PRODUCING STRONG SWALLOW. PATIENT ALSO COMPLETED READING OF 6 TO 8 WORD PHRASES AND REQUIRED MODERATE CUES TO UTILIZE COMPENSATORY STRATEGIES TO INCREASE SPEECH CLARITY. PATIENT DEMONSTRATED MILD SHORTNESS OF BREATH WITH RUNNING SPEECH. PATIENT IS IMPULSIVE WITH THERAPY ACTIVITIES AND DURING MEALS. Swallowing Short Term Goals  Short-term Goals  Timeframe for Short-term Goals: 1-2x/day for 1 wk  Goal 1: Patient will tolerate mechanical soft consistency and nectar thick liquids with min S/S penetration/aspiration during PO intake. Goal 2: Patient staff will follow swallow safety recommendations to decrease risk of penetration/aspiration during PO intake.   Goal 3: Patient

## 2018-01-10 PROCEDURE — 92526 ORAL FUNCTION THERAPY: CPT

## 2018-01-10 PROCEDURE — 6370000000 HC RX 637 (ALT 250 FOR IP): Performed by: INTERNAL MEDICINE

## 2018-01-10 PROCEDURE — 99232 SBSQ HOSP IP/OBS MODERATE 35: CPT | Performed by: PSYCHIATRY & NEUROLOGY

## 2018-01-10 PROCEDURE — 92507 TX SP LANG VOICE COMM INDIV: CPT

## 2018-01-10 PROCEDURE — 97530 THERAPEUTIC ACTIVITIES: CPT

## 2018-01-10 PROCEDURE — 6360000002 HC RX W HCPCS: Performed by: PSYCHIATRY & NEUROLOGY

## 2018-01-10 PROCEDURE — 97110 THERAPEUTIC EXERCISES: CPT

## 2018-01-10 PROCEDURE — 97535 SELF CARE MNGMENT TRAINING: CPT

## 2018-01-10 PROCEDURE — 97116 GAIT TRAINING THERAPY: CPT

## 2018-01-10 PROCEDURE — 1180000000 HC REHAB R&B

## 2018-01-10 RX ADMIN — GABAPENTIN 300 MG: 300 CAPSULE ORAL at 14:23

## 2018-01-10 RX ADMIN — FLUOXETINE HYDROCHLORIDE 20 MG: 20 CAPSULE ORAL at 08:29

## 2018-01-10 RX ADMIN — ASPIRIN 81 MG: 81 TABLET, COATED ORAL at 08:29

## 2018-01-10 RX ADMIN — GABAPENTIN 300 MG: 300 CAPSULE ORAL at 08:29

## 2018-01-10 RX ADMIN — OLANZAPINE 5 MG: 5 TABLET, FILM COATED ORAL at 20:32

## 2018-01-10 RX ADMIN — FLUTICASONE PROPIONATE 1 SPRAY: 50 SPRAY, METERED NASAL at 08:29

## 2018-01-10 RX ADMIN — PRAVASTATIN SODIUM 40 MG: 20 TABLET ORAL at 20:32

## 2018-01-10 RX ADMIN — ENOXAPARIN SODIUM 40 MG: 40 INJECTION SUBCUTANEOUS at 20:32

## 2018-01-10 RX ADMIN — GABAPENTIN 300 MG: 300 CAPSULE ORAL at 20:32

## 2018-01-10 NOTE — PLAN OF CARE
Problem: Falls - Risk of  Goal: Absence of falls  Outcome: Ongoing  Bed alarm set, call light within reach, side rails up times two    Problem: Risk for Impaired Skin Integrity  Goal: Tissue integrity - skin and mucous membranes  Structural intactness and normal physiological function of skin and  mucous membranes. Outcome: Ongoing  Skin remains intact. No skin breakdown at this time.

## 2018-01-10 NOTE — PLAN OF CARE
Problem: Falls - Risk of  Goal: Absence of falls  Outcome: Ongoing  No falls will occur this shift, personal alarm on .  Bed alarm on

## 2018-01-10 NOTE — PROGRESS NOTES
[facility]  INPATIENT SPEECH THERAPY  [facility] 8 REHAB UNIT  TIME   Time In: 1100  Time Out: 1200  Minutes: 60       [x]Daily Note  []Progress Note    Date: 1/10/2018  Patient Name: Jennifer Canchola        MRN: [id number]    Account #: [de-identified]  : 1965  (50 y.o.)  Gender: female   Primary Provider: Omer Huffman MD  Precautions: Swallowing, Impulsivity, Fall  Swallowing Status/Diet: Dysphagia II diet with nectar thick liquids      Subjective: The patient was upright in her wheelchair. She denied pain this morning. Objective: The patient tolerated thin liquid trials via cup between meals without difficulty. She did require cues to pause before taking another drink. Pt tolerated nectar thick liquids via cup during her noon meal.  Per OT, pt filled her oral cavity with food and exhibited decreased awareness of buccal cavity pocketing and oral residue. She was also leaning over in her wheelchair to  food that she had dropped. The patient was seen during her noon meal and she fed herself. Max verbal cues were necessary for the patient to take small bites and sips, to masticate thoroughly, clear the oral cavity, and to pause before taking another sip or bite. Buccal cavity pocketing and oral residue was observed and the patient was able to clear this with lingual sweep and liquid wash. At rest, some left labial loss of saliva was noted. Left lingual deviation also noted. Hyolaryngeal elevation per palpation was sluggish. Her MPT was 12 seconds this date. SLP reviewed all speech intelligiblity strategies and the patient required moderate cueing to utilize these during today's session. The patient's  is very supportive and has been present for some meals. He has been providing cueing for her to use a slow rate, take small bites and clear the oral cavity prior to taking more bites.        Swallowing Short Term Goals  Short-term Goals  Timeframe for Short-term Goals: 1-2x/day for 1 wk  Goal 1: by a licensed therapist to address functional deficits as outlined in the established plan of care.                 Electronically Signed By:  Tawanda Rios  1/10/2018,3:12 PM.

## 2018-01-11 LAB
BASOPHILS ABSOLUTE: 0.1 K/UL (ref 0–0.2)
BASOPHILS RELATIVE PERCENT: 0.8 % (ref 0–1)
EOSINOPHILS ABSOLUTE: 0.5 K/UL (ref 0–0.6)
EOSINOPHILS RELATIVE PERCENT: 6.2 % (ref 0–5)
HCT VFR BLD CALC: 39.5 % (ref 37–47)
HEMOGLOBIN: 12.8 G/DL (ref 12–16)
LYMPHOCYTES ABSOLUTE: 2.9 K/UL (ref 1.1–4.5)
LYMPHOCYTES RELATIVE PERCENT: 40.4 % (ref 20–40)
MCH RBC QN AUTO: 32.3 PG (ref 27–31)
MCHC RBC AUTO-ENTMCNC: 32.4 G/DL (ref 33–37)
MCV RBC AUTO: 99.7 FL (ref 81–99)
MONOCYTES ABSOLUTE: 0.7 K/UL (ref 0–0.9)
MONOCYTES RELATIVE PERCENT: 9.4 % (ref 0–10)
NEUTROPHILS ABSOLUTE: 3.1 K/UL (ref 1.5–7.5)
NEUTROPHILS RELATIVE PERCENT: 42.9 % (ref 50–65)
PDW BLD-RTO: 14 % (ref 11.5–14.5)
PLATELET # BLD: 242 K/UL (ref 130–400)
PMV BLD AUTO: 10.1 FL (ref 9.4–12.3)
RBC # BLD: 3.96 M/UL (ref 4.2–5.4)
WBC # BLD: 7.3 K/UL (ref 4.8–10.8)

## 2018-01-11 PROCEDURE — 97535 SELF CARE MNGMENT TRAINING: CPT

## 2018-01-11 PROCEDURE — 97116 GAIT TRAINING THERAPY: CPT

## 2018-01-11 PROCEDURE — 1180000000 HC REHAB R&B

## 2018-01-11 PROCEDURE — 6360000002 HC RX W HCPCS: Performed by: PSYCHIATRY & NEUROLOGY

## 2018-01-11 PROCEDURE — 85025 COMPLETE CBC W/AUTO DIFF WBC: CPT

## 2018-01-11 PROCEDURE — 97110 THERAPEUTIC EXERCISES: CPT

## 2018-01-11 PROCEDURE — 36415 COLL VENOUS BLD VENIPUNCTURE: CPT

## 2018-01-11 PROCEDURE — 6370000000 HC RX 637 (ALT 250 FOR IP): Performed by: INTERNAL MEDICINE

## 2018-01-11 PROCEDURE — 92526 ORAL FUNCTION THERAPY: CPT

## 2018-01-11 PROCEDURE — 92507 TX SP LANG VOICE COMM INDIV: CPT

## 2018-01-11 RX ADMIN — FLUOXETINE HYDROCHLORIDE 20 MG: 20 CAPSULE ORAL at 08:23

## 2018-01-11 RX ADMIN — OLANZAPINE 5 MG: 5 TABLET, FILM COATED ORAL at 20:36

## 2018-01-11 RX ADMIN — ENOXAPARIN SODIUM 40 MG: 40 INJECTION SUBCUTANEOUS at 20:36

## 2018-01-11 RX ADMIN — GABAPENTIN 300 MG: 300 CAPSULE ORAL at 20:36

## 2018-01-11 RX ADMIN — FLUTICASONE PROPIONATE 1 SPRAY: 50 SPRAY, METERED NASAL at 08:23

## 2018-01-11 RX ADMIN — ASPIRIN 81 MG: 81 TABLET, COATED ORAL at 08:23

## 2018-01-11 RX ADMIN — PRAVASTATIN SODIUM 40 MG: 20 TABLET ORAL at 20:36

## 2018-01-11 RX ADMIN — GABAPENTIN 300 MG: 300 CAPSULE ORAL at 08:23

## 2018-01-11 NOTE — PLAN OF CARE
Problem: Falls - Risk of  Goal: Absence of falls  Outcome: Ongoing  No falls will occur this shift, personal alarm on . Bed alarm on    Problem: Risk for Impaired Skin Integrity  Goal: Tissue integrity - skin and mucous membranes  Structural intactness and normal physiological function of skin and  mucous membranes.    Outcome: Ongoing

## 2018-01-11 NOTE — PROGRESS NOTES
01/11/18 0900   Strength RLE   Strength RLE Exception   Comment 5/5 grossly   Strength LLE   Strength LLE Exception   Comment 4/5 grossly, with decreased control   Bed Mobility   Rolling Stand by assistance   Supine to Sit Stand by assistance   Sit to Supine Stand by assistance   Transfers   Sit to Stand Contact guard assistance   Stand to sit Contact guard assistance   Bed to Chair Contact guard assistance   Ambulation 1   Device Rolling Walker   Assistance Moderate assistance;Minimal assistance   Quality of Gait STEP TO GAIT WITH EXCESSIVE LEFT STEP LENGTH AND POST LOB DURING RIGHT ADVANCEMENT. IMPROVED LEFT STEP LENGTH WITH VISUAL CUES FOR FOOT PLACEMNET. INSUFFICNET RIGHT WEIGHT SHIFT DURING LEFT SWING CAUSING FOREFOOT/TOE DRAG. LEFT HAND INATTENTION WITH LEFT HAND LOSING  CAUSING LOB. FATIGUED QUICKLY   Distance 20 FT   Stairs/Curb   Stairs? Yes   Stairs   # Steps  3   Rails Bilateral   Assistance Moderate assistance   Comment POST LOB. BILAT KNEE INSTABILITY. MAXIMUM CUES FOR TECHNIQUE. Propulsion 1   Level Level Tile   Method RUE;RLE   Level of Assistance Minimal assistance   Description/ Details FATIGUED QUICKLY. Distance 100 FT   Conditions Requiring Skilled Therapeutic Intervention   Body structures, Functions, Activity limitations Decreased functional mobility ; Decreased strength;Decreased endurance;Decreased balance;Decreased coordination;Decreased safe awareness   Assessment PATIETN CONTINUES TO PRESENT WITH IMPULSIVITY, LEFT SIDE INATENTION WITH DECREASED CONTROL. MAXIMUM CUES REQUIRED FOR PROPER PERFORMANCE OF AMBULATION, STEPS. MOD A REQUIRED FOR LOB DURING AMBULATION.      Electronically signed by Cortney Hylton PT on 1/11/2018 at 9:59 AM

## 2018-01-11 NOTE — PROGRESS NOTES
Nutrition Assessment    Type and Reason for Visit: Reassess, Consult    Malnutrition Assessment:  · Malnutrition Status: No malnutrition    Nutrition Diagnosis:   · Problem: No nutrition diagnosis at this time    Nutrition Assessment:  · Subjective Assessment: Consulted re: Ronaldo Billy. Pt continues to have good appetite, usually eats 75% or more. Tolerating diet. Nutrition Risk Level   Risk Level: Low    Nutrition Intervention  Food and/or Delivery: Continue current diet, ONS not indicated  Nutrition Education/Counseling/Coordination of Care:  Continued Inpatient Monitoring    Patient assessed for nutrition risk. Deemed to be at low risk at this time. Will continue to follow patient.       Electronically signed by Francie Fuentes, MS, RD, LD on 1/11/18 at 7:47 AM    Contact Number: [phone]

## 2018-01-11 NOTE — PROGRESS NOTES
control;Decreased high-level IADLs;Decreased cognition;Decreased ADL status; Decreased endurance;Decreased sensation;Decreased strength;Decreased vision/visual deficit  Assessment: impulsive at times, requires cues to attend to task, easily distracted  Patient will benefit from further skilled therapy in this setting to address Left inattention, functional mobility, ADLs, balance, and home safety. Patient is impulsive at times; therefore, continued therapy to increase home safety is highly recommended. Patient was independent prior to admit. Treatment Diagnosis: CVA with L side weakness  Prognosis: Good  Activity Tolerance  Activity Tolerance: Patient Tolerated treatment well  Safety Devices  Safety Devices in place: Yes  Type of devices: Call light within reach; Left in chair;Chair alarm in place (Sitting in recliner; spouse also in room)       Discharge Recommendations:  IP Rehab     Plan   Plan  Specific instructions for Next Treatment: BITS  Current Treatment Recommendations: Strengthening, Patient/Caregiver Education & Training, Balance Training, Home Management Training, Functional Mobility Training, Self-Care / ADL, Safety Education & Training, Neuromuscular Re-education    Goals  Short term goals  Time Frame for Short term goals: 1 week  Short term goal 1: complete UB dressing with setup  Short term goal 2: complete LB dressing with min A  Short term goal 3: met  Short term goal 4: complete L UE GM/FM tasks x 5 occasions to increase independence with ADLs  Short term goal 5: MET  Short term goal 6: met  Short term goal 7: complete ambulatory home making task with min A for balance  Long term goals  Time Frame for Long term goals : 3 weeks  Long term goal 1: complete ambulatory home making task with modified independence`  Long term goal 2: complete overall toileting with modified independence  Long term goal 3: complete overall dressing with modified independence  Long term goal 4: complete HEP with independence  Long term goal 5: complete overall bathing with modified independence  Long term goals 6: verbalize DME  Patient Goals   Patient goals : regain independence       Therapy Time   Individual Concurrent Group Co-treatment   Time In 1000         Time Out 1100         Minutes 60         Timed Code Treatment Minutes: 60 Minutes       Electronically signed by Kameron Steele OT on 1/11/2018 at 2:54 PM

## 2018-01-11 NOTE — PROGRESS NOTES
[facility]  INPATIENT SPEECH THERAPY  [facility] 8 REHAB UNIT  TIME   Time In: 0700  Time Out: 0800  Minutes: 60       [x]Daily Note  []Progress Note    Date: 2018  Patient Name: Ron Truong        MRN: [id number]    Account #: [de-identified]  : 1965  (50 y.o.)  Gender: female   Primary Provider: Earlene Andres MD  Precautions:  Swallowing, Impulsivity during meals  Swallowing Status/Diet: Dysphagia II, nectar thick liquids      Subjective: The patient was upright in her bed. Her spouse reports she did use a slower rate during po intake last night. Objective: The patient was seen at morning meal. She did use a slower rate but still required min to mod verbal cues. Pt required moderate verbal cues to use a lingual sweep to the left buccal cavity. Lingual residue was also noted. Liquid wash was effective in clearing the lingual residue. Pt has a tendency to take large bites and large sips. No overt s/s of aspiration observed with nectar thick liquids or soft solids. Recommend continue dysphagia II diet with nectar thick liquids, will continue thin liquid trials with speech therapy. All swallowing precautions were reviewed and the patient and her spouse expressed understanding. Recommend:   Continue dysphagia II diet  (extra sauces/gravies)  Nectar thick liquids   Meds whole in pudding or applesauce  If patient receives mouth care prior to intake, okay for thin H2O in between meals for comfort      Precautions:  Small bites and sips  Upright for all oral intake  Remain upright for 30 minutes following all meals  Utilize lingual search/sweep during and following all meals  Alternate liquids and foods          SHORT TERM GOAL #1:  Goal 1: Goal 1: Patient will tolerate mechanical soft consistency and nectar thick liquids with min S/S penetration/aspiration during PO intake.  not met    SHORT TERM GOAL #2:  Goal 2: Patient staff will follow swallow safety recommendations to decrease risk of penetration/aspiration during PO intake. not met    SHORT TERM GOAL #3:  Goal 3: Patient will complete oral motor, lingual, and pharyngeal strengthening exercises with provision of min cues/prompts. not met    SHORT TERM GOAL #4:  Goal 4: Re-assessment of swallowing function for potential diet upgrade. not met    SHORT TERM GOAL #5:  Goal 5: Patient will utilize tools/strategies to promote increased clarity of speech at word, phrase, and sentence level at 70% of opportunities. not met    Swallowing Short Term Goals  Short-term Goals  Timeframe for Short-term Goals: 1-2x/day for 1 wk  Goal 1: Patient will tolerate mechanical soft consistency and nectar thick liquids with min S/S penetration/aspiration during PO intake. Goal 2: Patient staff will follow swallow safety recommendations to decrease risk of penetration/aspiration during PO intake. Goal 3: Patient will complete oral motor, lingual, and pharyngeal strengthening exercises with provision of min cues/prompts. Goal 4: Re-assessment of swallowing function for potential diet upgrade. Goal 5: Patient will utilize tools/strategies to promote increased clarity of speech at word, phrase, and sentence level at 70% of opportunities.      Comprehension: 6 - Complex ideas 90% or device (hearing aid/glasses)  Expression: 5 - Expresses basic ideas/needs only (hungry/hot/pain)  Social Interaction: 6 - Patient requires medication for mood and/or effect  Problem Solvin - Patient solves simple/routine tasks 75-90%+   Memory: 4 - Patient remembers 75-90%+ of the time    ASSESSMENT:  Assessment: [x]Progressing towards goals          []Not Progressing towards goals    Patient Tolerance of Treatment:   [x]Tolerated well []Tolerated fair []Required rest breaks []Fatigued    Education:  Learner:  [x]Patient          [x]Significant Other          [x]Other-RN  Education provided regarding:  [x]Goals and POC   []Diet and swallowing precautions    []Home Exercise

## 2018-01-12 PROCEDURE — 99232 SBSQ HOSP IP/OBS MODERATE 35: CPT | Performed by: PSYCHIATRY & NEUROLOGY

## 2018-01-12 PROCEDURE — 6370000000 HC RX 637 (ALT 250 FOR IP): Performed by: INTERNAL MEDICINE

## 2018-01-12 PROCEDURE — 6360000002 HC RX W HCPCS: Performed by: PSYCHIATRY & NEUROLOGY

## 2018-01-12 PROCEDURE — 97110 THERAPEUTIC EXERCISES: CPT

## 2018-01-12 PROCEDURE — 1180000000 HC REHAB R&B

## 2018-01-12 PROCEDURE — 97530 THERAPEUTIC ACTIVITIES: CPT

## 2018-01-12 PROCEDURE — 92507 TX SP LANG VOICE COMM INDIV: CPT

## 2018-01-12 PROCEDURE — 92526 ORAL FUNCTION THERAPY: CPT

## 2018-01-12 RX ADMIN — GABAPENTIN 300 MG: 300 CAPSULE ORAL at 08:12

## 2018-01-12 RX ADMIN — FLUTICASONE PROPIONATE 1 SPRAY: 50 SPRAY, METERED NASAL at 08:12

## 2018-01-12 RX ADMIN — GABAPENTIN 300 MG: 300 CAPSULE ORAL at 20:53

## 2018-01-12 RX ADMIN — PRAVASTATIN SODIUM 40 MG: 20 TABLET ORAL at 20:53

## 2018-01-12 RX ADMIN — OLANZAPINE 5 MG: 5 TABLET, FILM COATED ORAL at 20:53

## 2018-01-12 RX ADMIN — ASPIRIN 81 MG: 81 TABLET, COATED ORAL at 08:12

## 2018-01-12 RX ADMIN — FLUOXETINE HYDROCHLORIDE 20 MG: 20 CAPSULE ORAL at 08:13

## 2018-01-12 RX ADMIN — GABAPENTIN 300 MG: 300 CAPSULE ORAL at 13:59

## 2018-01-12 RX ADMIN — ENOXAPARIN SODIUM 40 MG: 40 INJECTION SUBCUTANEOUS at 20:53

## 2018-01-12 NOTE — PROGRESS NOTES
[facility]  INPATIENT SPEECH THERAPY  [facility] 8 REHAB UNIT  TIME   Time In: 1100  Time Out: 1200  Minutes: 60       [x]Daily Note  []Progress Note    Date: 2018  Patient Name: Sajan Hdz        MRN: [id number]    Account #: [de-identified]  : 1965  (50 y.o.)  Gender: female   Primary Provider: Michael Haddad MD  Precautions: n/a  Swallowing: Dysphagia 2 diet, nectar thick liquids        Subjective: The patient was upright in her wheelchair. She denied pain. Objective: The patient was seen at noon meal. Left sided pocketing was noted throughout the meal. Max cues were provided for small bites, lingual sweep, liquid wash, and slow rate. Discussed diet downgrade to puree if she does not follow swallow precautions. Her  is still very involved and is present for all meals. He continues to provide cues during meals. Patient did complete oral motor exercises and decreased lingual strength and range of motion. Practiced utilizing speech intelligibility strategies, however, she has not used these independently. Recommend continue dysphagia 2 diet with nectar thick liquids. All swallowing precautions were reviewed and education provided regarding risk of aspiration. Swallowing precautions are posted in the patient's room. SHORT TERM GOAL #1:  Goal 1: Goal 1: Patient will tolerate mechanical soft consistency and nectar thick liquids with min S/S penetration/aspiration during PO intake. not met    SHORT TERM GOAL #2:  Goal 2: Patient staff will follow swallow safety recommendations to decrease risk of penetration/aspiration during PO intake. not met    SHORT TERM GOAL #3:  Goal 3: Patient will complete oral motor, lingual, and pharyngeal strengthening exercises with provision of min cues/prompts. not met    SHORT TERM GOAL #4:  Goal 4: Re-assessment of swallowing function for potential diet upgrade.  not met    SHORT TERM GOAL #5:  Goal 5: Patient will utilize tools/strategies to promote increased clarity of speech at word, phrase, and sentence level at 70% of opportunities. not met    Swallowing Short Term Goals  Short-term Goals  Timeframe for Short-term Goals: 1-2x/day for 1 wk  Goal 1: Patient will tolerate mechanical soft consistency and nectar thick liquids with min S/S penetration/aspiration during PO intake. Goal 2: Patient staff will follow swallow safety recommendations to decrease risk of penetration/aspiration during PO intake. Goal 3: Patient will complete oral motor, lingual, and pharyngeal strengthening exercises with provision of min cues/prompts. Goal 4: Re-assessment of swallowing function for potential diet upgrade. Goal 5: Patient will utilize tools/strategies to promote increased clarity of speech at word, phrase, and sentence level at 70% of opportunities.      Comprehension: 6 - Complex ideas 90% or device (hearing aid/glasses)  Expression: 5 - Expresses basic ideas/needs only (hungry/hot/pain)  Social Interaction: 6 - Patient requires medication for mood and/or effect  Problem Solvin - Patient solves simple/routine tasks 75-90%+   Memory: 4 - Patient remembers 75-90%+ of the time    ASSESSMENT:  Assessment: []Progressing towards goals          []Not Progressing towards goals    Patient Tolerance of Treatment:   [x]Tolerated well []Tolerated fair []Required rest breaks []Fatigued    Education:  Learner:  [x]Patient          []Significant Other          []Other  Education provided regarding:  [x]Goals and POC   [x]Diet and swallowing precautions    []Home Exercise Program  []Progress and/or discharge information  Method of Education:  [x]Discussion          []Demonstration          []Handout          []Other  Evaluation of Education:   [x]Verbalized understanding   []Demonstrates without assistance  []Demonstrates with assistance  []Needs further instruction     []No evidence of learning                  []No family present      Plan: [x]Continue with current plan of care    []Modify current plan of care as follows:    []Discharge patient    Discharge Location:    Services/Supervision Recommended:      [x]Patient continues to require treatment by a licensed therapist to address functional deficits as outlined in the established plan of care.         Electronically Signed By:  Cheli Borges  1/12/2018,12:56 PM.

## 2018-01-12 NOTE — PROGRESS NOTES
2036    docusate sodium (COLACE) capsule 100 mg, 100 mg, Oral, BID PRN, Hyun Hillman MD, 100 mg at 01/08/18 2058    bisacodyl (DULCOLAX) suppository 10 mg, 10 mg, Rectal, Daily PRN, Hyun Hillman MD    enoxaparin (LOVENOX) injection 40 mg, 40 mg, Subcutaneous, Nightly, Hyun Hillman MD, 40 mg at 01/11/18 2036    Allergies:  Penicillins    Social History:   TOBACCO:   reports that she has been smoking. She has been smoking about 0.50 packs per day. She has never used smokeless tobacco.  ETOH:   reports that she drinks alcohol. Family History:       Problem Relation Age of Onset    Mental Illness Mother     Diabetes Mother     Cancer Mother     Mental Illness Father          PHYSICAL EXAM:  BP 99/61   Pulse 86   Temp 97.6 °F (36.4 °C) (Temporal)   Resp 18   Ht 5' 2\" (1.575 m)   Wt 173 lb 3.2 oz (78.6 kg)   SpO2 100%   BMI 31.68 kg/m²     Constitutional: she appears well-developed and well-nourished. Eyes - conjunctiva normal.    Ear, nose, throat - No scars, masses, or lesions over external nose or ears, no atrophy of tongue  Neck-symmetric, no masses noted, no jugular vein distension  Respiration- chest wall appears symmetric, good expansion,   normal effort without use of accessory muscles  Musculoskeletal - no significant wasting of muscles noted, no bony deformities Extremities-no clubbing, cyanosis or edema  Skin - warm, dry, and intact. No rash, erythema, or pallor.   Psychiatric - mood, affect, and behavior appear normal.      Neurology  NEUROLOGICAL EXAM:      Mental status   Awake, alert, fluent oriented x 3 appropriate affect  Attention and concentration appear appropriate  Recent and remote memory appears unremarkable  Speech normal without dysarthria  No clear issues with language       Cranial Nerves   CN III, IV,VI-EOMI, No nystagmus, conjugate eye movements, no ptosis    CN VII-left facial droop           Motor function  Antigravity x 4 drift left     Sensory function

## 2018-01-13 PROCEDURE — 6370000000 HC RX 637 (ALT 250 FOR IP): Performed by: INTERNAL MEDICINE

## 2018-01-13 PROCEDURE — 6360000002 HC RX W HCPCS: Performed by: PSYCHIATRY & NEUROLOGY

## 2018-01-13 PROCEDURE — 1180000000 HC REHAB R&B

## 2018-01-13 RX ADMIN — PRAVASTATIN SODIUM 40 MG: 20 TABLET ORAL at 20:17

## 2018-01-13 RX ADMIN — ASPIRIN 81 MG: 81 TABLET, COATED ORAL at 09:40

## 2018-01-13 RX ADMIN — GABAPENTIN 300 MG: 300 CAPSULE ORAL at 20:17

## 2018-01-13 RX ADMIN — FLUTICASONE PROPIONATE 1 SPRAY: 50 SPRAY, METERED NASAL at 09:39

## 2018-01-13 RX ADMIN — ENOXAPARIN SODIUM 40 MG: 40 INJECTION SUBCUTANEOUS at 20:17

## 2018-01-13 RX ADMIN — FLUOXETINE HYDROCHLORIDE 20 MG: 20 CAPSULE ORAL at 09:40

## 2018-01-13 RX ADMIN — GABAPENTIN 300 MG: 300 CAPSULE ORAL at 09:40

## 2018-01-13 RX ADMIN — GABAPENTIN 300 MG: 300 CAPSULE ORAL at 15:22

## 2018-01-13 RX ADMIN — OLANZAPINE 5 MG: 5 TABLET, FILM COATED ORAL at 20:17

## 2018-01-13 NOTE — PLAN OF CARE
Problem: Falls - Risk of  Goal: Absence of falls  Outcome: Ongoing  Bed alarm set, call light within reach, side rails up times two    Problem: Risk for Impaired Skin Integrity  Goal: Tissue integrity - skin and mucous membranes  Structural intactness and normal physiological function of skin and  mucous membranes.    Outcome: Ongoing  Turn and reposition every 2 hours

## 2018-01-14 PROCEDURE — 6370000000 HC RX 637 (ALT 250 FOR IP): Performed by: INTERNAL MEDICINE

## 2018-01-14 PROCEDURE — 6360000002 HC RX W HCPCS: Performed by: PSYCHIATRY & NEUROLOGY

## 2018-01-14 PROCEDURE — 99232 SBSQ HOSP IP/OBS MODERATE 35: CPT | Performed by: PSYCHIATRY & NEUROLOGY

## 2018-01-14 PROCEDURE — 1180000000 HC REHAB R&B

## 2018-01-14 RX ADMIN — OLANZAPINE 5 MG: 5 TABLET, FILM COATED ORAL at 20:06

## 2018-01-14 RX ADMIN — FLUOXETINE HYDROCHLORIDE 20 MG: 20 CAPSULE ORAL at 08:29

## 2018-01-14 RX ADMIN — ENOXAPARIN SODIUM 40 MG: 40 INJECTION SUBCUTANEOUS at 20:06

## 2018-01-14 RX ADMIN — GABAPENTIN 300 MG: 300 CAPSULE ORAL at 15:48

## 2018-01-14 RX ADMIN — GABAPENTIN 300 MG: 300 CAPSULE ORAL at 20:05

## 2018-01-14 RX ADMIN — GABAPENTIN 300 MG: 300 CAPSULE ORAL at 08:29

## 2018-01-14 RX ADMIN — PRAVASTATIN SODIUM 40 MG: 20 TABLET ORAL at 20:06

## 2018-01-14 RX ADMIN — ASPIRIN 81 MG: 81 TABLET, COATED ORAL at 08:29

## 2018-01-14 RX ADMIN — FLUTICASONE PROPIONATE 1 SPRAY: 50 SPRAY, METERED NASAL at 08:28

## 2018-01-15 LAB
BASOPHILS ABSOLUTE: 0 K/UL (ref 0–0.2)
BASOPHILS RELATIVE PERCENT: 0.6 % (ref 0–1)
EOSINOPHILS ABSOLUTE: 0.5 K/UL (ref 0–0.6)
EOSINOPHILS RELATIVE PERCENT: 6.5 % (ref 0–5)
HCT VFR BLD CALC: 39.4 % (ref 37–47)
HEMOGLOBIN: 12.6 G/DL (ref 12–16)
LYMPHOCYTES ABSOLUTE: 2.8 K/UL (ref 1.1–4.5)
LYMPHOCYTES RELATIVE PERCENT: 40.3 % (ref 20–40)
MCH RBC QN AUTO: 32.4 PG (ref 27–31)
MCHC RBC AUTO-ENTMCNC: 32 G/DL (ref 33–37)
MCV RBC AUTO: 101.3 FL (ref 81–99)
MONOCYTES ABSOLUTE: 0.7 K/UL (ref 0–0.9)
MONOCYTES RELATIVE PERCENT: 10.1 % (ref 0–10)
NEUTROPHILS ABSOLUTE: 3 K/UL (ref 1.5–7.5)
NEUTROPHILS RELATIVE PERCENT: 41.9 % (ref 50–65)
PDW BLD-RTO: 14.2 % (ref 11.5–14.5)
PLATELET # BLD: 253 K/UL (ref 130–400)
PMV BLD AUTO: 10.1 FL (ref 9.4–12.3)
RBC # BLD: 3.89 M/UL (ref 4.2–5.4)
WBC # BLD: 7 K/UL (ref 4.8–10.8)

## 2018-01-15 PROCEDURE — 99232 SBSQ HOSP IP/OBS MODERATE 35: CPT | Performed by: PSYCHIATRY & NEUROLOGY

## 2018-01-15 PROCEDURE — 97110 THERAPEUTIC EXERCISES: CPT

## 2018-01-15 PROCEDURE — 36415 COLL VENOUS BLD VENIPUNCTURE: CPT

## 2018-01-15 PROCEDURE — 85025 COMPLETE CBC W/AUTO DIFF WBC: CPT

## 2018-01-15 PROCEDURE — 92526 ORAL FUNCTION THERAPY: CPT

## 2018-01-15 PROCEDURE — 97116 GAIT TRAINING THERAPY: CPT

## 2018-01-15 PROCEDURE — 92507 TX SP LANG VOICE COMM INDIV: CPT

## 2018-01-15 PROCEDURE — 1180000000 HC REHAB R&B

## 2018-01-15 PROCEDURE — 6370000000 HC RX 637 (ALT 250 FOR IP): Performed by: INTERNAL MEDICINE

## 2018-01-15 PROCEDURE — 97530 THERAPEUTIC ACTIVITIES: CPT

## 2018-01-15 PROCEDURE — 6360000002 HC RX W HCPCS: Performed by: PSYCHIATRY & NEUROLOGY

## 2018-01-15 RX ADMIN — FLUOXETINE HYDROCHLORIDE 20 MG: 20 CAPSULE ORAL at 08:06

## 2018-01-15 RX ADMIN — GABAPENTIN 300 MG: 300 CAPSULE ORAL at 08:06

## 2018-01-15 RX ADMIN — OLANZAPINE 5 MG: 5 TABLET, FILM COATED ORAL at 21:01

## 2018-01-15 RX ADMIN — PRAVASTATIN SODIUM 40 MG: 20 TABLET ORAL at 21:01

## 2018-01-15 RX ADMIN — GABAPENTIN 300 MG: 300 CAPSULE ORAL at 14:04

## 2018-01-15 RX ADMIN — GABAPENTIN 300 MG: 300 CAPSULE ORAL at 21:01

## 2018-01-15 RX ADMIN — FLUTICASONE PROPIONATE 1 SPRAY: 50 SPRAY, METERED NASAL at 08:07

## 2018-01-15 RX ADMIN — ASPIRIN 81 MG: 81 TABLET, COATED ORAL at 08:06

## 2018-01-15 RX ADMIN — ENOXAPARIN SODIUM 40 MG: 40 INJECTION SUBCUTANEOUS at 21:01

## 2018-01-15 NOTE — PATIENT CARE CONFERENCE
week:  1. Overall CGA for toileting and LB dressing now. 2. IMPROVED COORDINATION RIGHT UE/LE DURING WHEELCHAIR PROPULSION  3.  4.  5.      Goals for following week:  1. Complete UB dressing with cues only for vi tech. 2.  TF FIM 5 WITH PROPER, SAFE SET UP  3.   4.   5.     Factors facilitating achievement of predicted outcomes: Motivated and Cooperative    Barriers to the achievement of predicted outcomes: Impulsivity, distractibility    Team Members Present at Conference:  : Carlyle Sawyer [facility]   Occupational Therapist: Erica Solorzano, CAROLAR/L  Physical Therapist: Devaughn Hendricks PT,DPT  Speech Therapist: Nicole Goodwin 87, [facility]  Nurse: Carter Montelongo, RN   Nurse Manager:  Brent Young, VIRIDIANA  Dietitian:  Matthew Morillo MS, RD, LD  Rehab Director:  Awilda Green approve the established interdisciplinary plan of care as documented within the medical record of Do Feldman.

## 2018-01-15 NOTE — PROGRESS NOTES
Patient:   Soraya Nair  MR#:    [id number]   Room:    [facility]/812-01   YOB: 1965  Date of Progress Note: 1/15/2018  Time of Note                           7:58 AM  Consulting Physician:   Kvng De Anda M.D. Attending Physician:  Kvng De Anda MD     Chief complaint stroke/abnormal brain scan    S:This 50 y.o. female  who presented to [facility] ER on 1/1/8 with c/o left sided weakness & facial drooping,right-sided numbness,dizziness, dysarthria that had started about 1 week prior. CT of head done in ER revealed right frontal lobe hypodensities are most consistent with  nonhemorrhagic subacute ischemia of the right middle cerebral artery distribution. She was admitted for further treatment with consult for neurology. MRI of brain done revealed seeral areas of small acute inarction in the right hemisphere,as well as a 17x42 mm enhancing lesion on the right basal ganglia. She was seen by Neurologist Dr. Dilma Roque who felt the infarcts were consistent with embolic process. Cardiology was consulted for evaluation WAQAR and loop recorder. She was seen by Dr. Vallarie Landau and taken for WAQAR and loop recorder placement on 1/4/18. She tolerated the procedure well. Neurosurgery was consulted to evaluate lesion. She was seen by Dr. Mose Crigler. Dr. Michail Skiff believed that the findings of the MRI are consistent with an ischemic event followed by a small amount of hemorrhage that is resulting in cerebral edema, however, the possibility of an underlying mass is not entirely excluded. He will follow her very closely to watch the dynamics of the mass. He felt that a biopsy of a deep cerebral lesion that could possibly be a stroke poses more risk than benefit at this time and recommended repeat MRI brain in 4-6 weeks with an outpatient follow up. She continues to have left sided weakness,dysarthria and dysphasia. She was evaluated by SPT therapy and is currently on mechanical soft diet with nectar thick liquids.  She is Cerebellar      Tremor None present     Gait                  Not tested           Nursing/pcp notes, imaging,labs and vitals reviewed. PT,OT and/or speech notes reviewed    Lab Results   Component Value Date    WBC 7.0 01/15/2018    HGB 12.6 01/15/2018    HCT 39.4 01/15/2018    .3 (H) 01/15/2018     01/15/2018     Lab Results   Component Value Date     01/06/2018    K 3.8 01/06/2018     01/06/2018    CO2 28 01/06/2018    BUN 12 01/06/2018    CREATININE 0.7 01/06/2018    GLUCOSE 97 01/06/2018    CALCIUM 9.1 01/06/2018    PROT 6.7 01/06/2018    LABALBU 3.7 01/06/2018    BILITOT <0.2 01/06/2018    ALKPHOS 63 01/06/2018    AST 16 01/06/2018    ALT 19 01/06/2018    LABGLOM >60 01/06/2018    GLOB 3.0 11/12/2016     Lab Results   Component Value Date    INR 1.03 01/01/2018    PROTIME 13.4 01/01/2018             RECORD REVIEW: Previous medical records, medications were reviewed at today's visit    IMPRESSION:   1. Stroke-on aspirin/statin-cardioembolic status post loop recorder/WAQAR  2. Abnormal brain scan- repeat MRI of the brain in 4-6 weeks  3. Mood disorder-on medications continue monitor  4. DVT prophylaxis-on Lovenox  5. Allergies Flonase  6. Hyperlipidemia-on statin  7.  PT/OT/speech  Continue present  care  ELOS pending    [address] CELL  Dr Ger Cai

## 2018-01-15 NOTE — PROGRESS NOTES
plan of care    []Modify current plan of care as follows:    []Discharge patient    Discharge Location:    Services/Supervision Recommended:      [x]Patient continues to require treatment by a licensed therapist to address functional deficits as outlined in the established plan of care.         Electronically Signed By:  Brigido Moritz M.S. CCC-SLP  1/15/2018,10:32 AM.

## 2018-01-16 PROCEDURE — 97110 THERAPEUTIC EXERCISES: CPT

## 2018-01-16 PROCEDURE — 99233 SBSQ HOSP IP/OBS HIGH 50: CPT | Performed by: PSYCHIATRY & NEUROLOGY

## 2018-01-16 PROCEDURE — 92507 TX SP LANG VOICE COMM INDIV: CPT

## 2018-01-16 PROCEDURE — 6370000000 HC RX 637 (ALT 250 FOR IP): Performed by: INTERNAL MEDICINE

## 2018-01-16 PROCEDURE — 97116 GAIT TRAINING THERAPY: CPT

## 2018-01-16 PROCEDURE — 6360000002 HC RX W HCPCS: Performed by: PSYCHIATRY & NEUROLOGY

## 2018-01-16 PROCEDURE — 92526 ORAL FUNCTION THERAPY: CPT

## 2018-01-16 PROCEDURE — 97535 SELF CARE MNGMENT TRAINING: CPT

## 2018-01-16 PROCEDURE — 1180000000 HC REHAB R&B

## 2018-01-16 RX ADMIN — GABAPENTIN 300 MG: 300 CAPSULE ORAL at 07:45

## 2018-01-16 RX ADMIN — GABAPENTIN 300 MG: 300 CAPSULE ORAL at 20:52

## 2018-01-16 RX ADMIN — PRAVASTATIN SODIUM 40 MG: 20 TABLET ORAL at 20:52

## 2018-01-16 RX ADMIN — GABAPENTIN 300 MG: 300 CAPSULE ORAL at 13:55

## 2018-01-16 RX ADMIN — FLUTICASONE PROPIONATE 1 SPRAY: 50 SPRAY, METERED NASAL at 07:45

## 2018-01-16 RX ADMIN — FLUOXETINE HYDROCHLORIDE 20 MG: 20 CAPSULE ORAL at 07:45

## 2018-01-16 RX ADMIN — ENOXAPARIN SODIUM 40 MG: 40 INJECTION SUBCUTANEOUS at 20:52

## 2018-01-16 RX ADMIN — ASPIRIN 81 MG: 81 TABLET, COATED ORAL at 07:45

## 2018-01-16 RX ADMIN — OLANZAPINE 5 MG: 5 TABLET, FILM COATED ORAL at 20:52

## 2018-01-16 ASSESSMENT — PAIN SCALES - GENERAL: PAINLEVEL_OUTOF10: 0

## 2018-01-16 NOTE — PROGRESS NOTES
participating in PT/OT as well. She currently lives with her  at [facility]. She is felt to need a stay on Rehab for continue PT/OT/SPT before for she will be ready to return ther with her . She is now felt ready to start the Rehab program. No acute issues overnight.         REVIEW OF SYSTEMS:  Constitutional: No fevers No chills  Neck:No stiffness  Respiratory: No shortness of breath  Cardiovascular: No chest pain No palpitations  Gastrointestinal: No abdominal pain    Genitourinary: No Dysuria  Neurological: No headache, no confusion    Past Medical History:      Diagnosis Date    Anxiety     Cryptogenic stroke ([facility] [address]     Depression     Hypertension     Status post placement of implantable loop recorder 2018       Past Surgical History:      Procedure Laterality Date     SECTION      x2    CHOLECYSTECTOMY      COSMETIC SURGERY      HAND SURGERY         Medications in Hospital:      Current Facility-Administered Medications:     acetaminophen (TYLENOL) tablet 650 mg, 650 mg, Oral, Q4H PRN, Josphine Crater Blanche, DO    magnesium hydroxide (MILK OF MAGNESIA) 400 MG/5ML suspension 30 mL, 30 mL, Oral, Daily PRN, Josphine Crater Blanche, DO, 30 mL at 18    aspirin EC tablet 81 mg, 81 mg, Oral, Daily, Josphine Crater Blanche, DO, 81 mg at 18 0745    FLUoxetine (PROZAC) capsule 20 mg, 20 mg, Oral, Daily, Josphine Crater Blanche, DO, 20 mg at 18 0745    fluticasone (FLONASE) 50 MCG/ACT nasal spray 1 spray, 1 spray, Nasal, Daily, Josphine Crater Blanche, DO, 1 spray at 18 0745    gabapentin (NEURONTIN) capsule 300 mg, 300 mg, Oral, TID, Josphine Crater Blanche, DO, 300 mg at 18 0745    hydrOXYzine (VISTARIL) capsule 25 mg, 25 mg, Oral, TID PRN, Josphine Crater Blanche, DO    OLANZapine (ZYPREXA) tablet 5 mg, 5 mg, Oral, Nightly, Josphine Crater Blanche, DO, 5 mg at 01/15/18 2101    pravastatin (PRAVACHOL) tablet 40 mg, 40 mg, Oral, Nightly, Qian Mancia DO, 40 mg at 01/15/18

## 2018-01-17 PROCEDURE — 92508 TX SP LANG VOICE COMM GROUP: CPT

## 2018-01-17 PROCEDURE — 97110 THERAPEUTIC EXERCISES: CPT

## 2018-01-17 PROCEDURE — 99232 SBSQ HOSP IP/OBS MODERATE 35: CPT | Performed by: PSYCHIATRY & NEUROLOGY

## 2018-01-17 PROCEDURE — 6370000000 HC RX 637 (ALT 250 FOR IP): Performed by: INTERNAL MEDICINE

## 2018-01-17 PROCEDURE — 6360000002 HC RX W HCPCS: Performed by: PSYCHIATRY & NEUROLOGY

## 2018-01-17 PROCEDURE — 97116 GAIT TRAINING THERAPY: CPT

## 2018-01-17 PROCEDURE — 97530 THERAPEUTIC ACTIVITIES: CPT

## 2018-01-17 PROCEDURE — 92526 ORAL FUNCTION THERAPY: CPT

## 2018-01-17 PROCEDURE — 97535 SELF CARE MNGMENT TRAINING: CPT

## 2018-01-17 PROCEDURE — 1180000000 HC REHAB R&B

## 2018-01-17 RX ADMIN — GABAPENTIN 300 MG: 300 CAPSULE ORAL at 15:13

## 2018-01-17 RX ADMIN — PRAVASTATIN SODIUM 40 MG: 20 TABLET ORAL at 21:27

## 2018-01-17 RX ADMIN — OLANZAPINE 5 MG: 5 TABLET, FILM COATED ORAL at 21:27

## 2018-01-17 RX ADMIN — ENOXAPARIN SODIUM 40 MG: 40 INJECTION SUBCUTANEOUS at 21:27

## 2018-01-17 RX ADMIN — GABAPENTIN 300 MG: 300 CAPSULE ORAL at 21:27

## 2018-01-17 RX ADMIN — GABAPENTIN 300 MG: 300 CAPSULE ORAL at 10:23

## 2018-01-17 RX ADMIN — FLUOXETINE HYDROCHLORIDE 20 MG: 20 CAPSULE ORAL at 10:23

## 2018-01-17 RX ADMIN — FLUTICASONE PROPIONATE 1 SPRAY: 50 SPRAY, METERED NASAL at 10:23

## 2018-01-17 RX ADMIN — ASPIRIN 81 MG: 81 TABLET, COATED ORAL at 10:23

## 2018-01-17 ASSESSMENT — PAIN SCALES - GENERAL: PAINLEVEL_OUTOF10: 0

## 2018-01-17 NOTE — PLAN OF CARE
Problem: Falls - Risk of  Goal: Absence of falls  Outcome: Ongoing  Encourage use of nonskid socks or slippers. Problem: Risk for Impaired Skin Integrity  Goal: Tissue integrity - skin and mucous membranes  Structural intactness and normal physiological function of skin and  mucous membranes. Outcome: Ongoing  Skin remains intact. No skin breakdown at this time.

## 2018-01-17 NOTE — PROGRESS NOTES
scanning act using BITS, see for results     Assessment   Decreased functional mobility ; Decreased coordination; Decreased ADL status; Decreased balance; Decreased strength; Decreased safe awareness; Decreased high-level IADLs; Decreased fine motor control  Pt is impulsive at times and requires vc for attention to task. She requires less cues for safety compared to last week. She is progressing throughout therapy. Has met all of her short term goals. She has met 4 goals this week. I recommend further therapy at this level in order to progress towards long term goals of independence with self care tasks and functional mobility.   Discharge Recommendations:  IP Rehab     Plan   Plan  Specific instructions for Next Treatment: ADL  Current Treatment Recommendations: Strengthening, Patient/Caregiver Education & Training, Home Management Training, Balance Training, Neuromuscular Re-education, Functional Mobility Training, Self-Care / ADL, Safety Education & Training    Goals  Short term goals  Time Frame for Short term goals: 1 week  Short term goal 1: met  Short term goal 2: met  Short term goal 3: met  Short term goal 4: met  Short term goal 5: MET  Short term goal 6: met  Short term goal 7: met  Long term goals  Time Frame for Long term goals : 3 weeks  Long term goal 1: complete ambulatory home making task with modified independence`  Long term goal 2: complete overall toileting with modified independence  Long term goal 3: complete overall dressing with modified independence  Long term goal 4: complete HEP with independence  Long term goal 5: complete overall bathing with modified independence  Long term goals 6: verbalize DME  Patient Goals   Patient goals : regain independence       Therapy Time   Individual Concurrent Group Co-treatment   Time In 0900         Time Out 1000         Minutes 60         Timed Code Treatment Minutes: 60 Minutes     Electronically signed by Katie Valdes OT on 1/17/2018 at 4:17 PM

## 2018-01-17 NOTE — PROGRESS NOTES
time    ASSESSMENT:  Assessment: [x]Progressing towards goals          []Not Progressing towards goals    Patient Tolerance of Treatment:   [x]Tolerated well []Tolerated fair []Required rest breaks []Fatigued    Education:  Learner:  [x]Patient          []Significant Other          []Other  Education provided regarding:  [x]Goals and POC   []Diet and swallowing precautions    []Home Exercise Program  []Progress and/or discharge information  Method of Education:  [x]Discussion          []Demonstration          []Handout          []Other  Evaluation of Education:   [x]Verbalized understanding   []Demonstrates without assistance  []Demonstrates with assistance  []Needs further instruction     []No evidence of learning                  []No family present      Plan: [x]Continue with current plan of care    []Modify current plan of care as follows:    []Discharge patient    Discharge Location:    Services/Supervision Recommended:      [x]Patient continues to require treatment by a licensed therapist to address functional deficits as outlined in the established plan of care.           Electronically Signed By:  Mary Wilkes  1/17/2018,3:24 PM.

## 2018-01-17 NOTE — PROGRESS NOTES
Cerebellar      Tremor None present     Gait                  Not tested           Nursing/pcp notes, imaging,labs and vitals reviewed. PT,OT and/or speech notes reviewed    Lab Results   Component Value Date    WBC 7.0 01/15/2018    HGB 12.6 01/15/2018    HCT 39.4 01/15/2018    .3 (H) 01/15/2018     01/15/2018     Lab Results   Component Value Date     01/06/2018    K 3.8 01/06/2018     01/06/2018    CO2 28 01/06/2018    BUN 12 01/06/2018    CREATININE 0.7 01/06/2018    GLUCOSE 97 01/06/2018    CALCIUM 9.1 01/06/2018    PROT 6.7 01/06/2018    LABALBU 3.7 01/06/2018    BILITOT <0.2 01/06/2018    ALKPHOS 63 01/06/2018    AST 16 01/06/2018    ALT 19 01/06/2018    LABGLOM >60 01/06/2018    GLOB 3.0 11/12/2016     Lab Results   Component Value Date    INR 1.03 01/01/2018    PROTIME 13.4 01/01/2018             RECORD REVIEW: Previous medical records, medications were reviewed at today's visit    IMPRESSION:   1. Stroke-on aspirin/statin-cardioembolic status post loop recorder/WAQAR  2. Abnormal brain scan- repeat MRI of the brain in 4-6 weeks  3. Mood disorder-on medications continue monitor  4. DVT prophylaxis-on Lovenox  5. Allergies Flonase  6. Hyperlipidemia-on statin  7.  PT/OT/speech    Continue care  LIBBY restajakob        CALL WITH ANY QUESTIONS  [phone] CELL  Dr Sherly Hughes

## 2018-01-17 NOTE — PROGRESS NOTES
PRE'S 15 REPS X 1 SET WITH REST PRN   Conditions Requiring Skilled Therapeutic Intervention   Body structures, Functions, Activity limitations --  --  --    Assessment --  --  --    Timed Code Treatment Minutes --  --  --    Activity Tolerance   Activity Tolerance --  --  --        01/17/18 1407 01/17/18 1408 01/17/18 1409   General   Chart Reviewed --  --  --    Additional Pertinent Hx --  --  --    Response To Previous Treatment --  --  --    General Comment   Comments --  --  --    Subjective   Subjective --  --  --    Pain Screening   Patient Currently in Pain No --  --    Pain Assessment   Pain Assessment --  --  --    Pain Level --  --  --    Patient's Stated Pain Goal --  --  --    Vital Signs   Temp 97.8 °F (36.6 °C) --  --    Temp Source Temporal --  --    Pulse 88 --  --    Heart Rate Source Monitor --  --    Resp 18 --  --    /82 --  --    BP Location Right Arm --  --    BP Upper/Lower Upper --  --    Patient Position Sitting --  --    Level of Consciousness 0 --  --    MEWS Score 1 --  --    Oxygen Therapy   SpO2 95 % --  --    O2 Device None (Room air) --  --    Bed Mobility   Rolling --  --  --    Supine to Sit --  --  --    Scooting --  --  --    Transfers   Sit to Stand --  --  --    Stand to sit --  --  --    Bed to Chair --  --  --    Stand Pivot Transfers --  --  --    Ambulation   Ambulation? --  Yes --    Ambulation 1   Surface --  level tile --    Other Apparatus --  Wheelchair follow --    Quality of Gait --  Pt showed inattention to L side especially her hand. Pt's L hand kept slipping on RW. Pt also tended to push RW to R side, but would correct it. Pt showed decreased step length Travis and forward flex posture.   --    Distance --  75 --    Comments --  instructed in 3 pt gait, and to shorted stride length, and to decrease rate of amb. --    Other exercises   Other exercises 1 --  --  --    Conditions Requiring Skilled Therapeutic Intervention   Body structures, Functions, Activity limitations --  --  Decreased functional mobility ; Decreased ADL status; Decreased ROM; Decreased strength;Decreased endurance;Decreased safe awareness   Assessment --  --  PATIENT HAS DECREASED SAFETY AWARENESS. Timed Code Treatment Minutes --  --  --    Activity Tolerance   Activity Tolerance --  --  Patient Tolerated treatment well       01/17/18 1410   General   Chart Reviewed --    Additional Pertinent Hx --    Response To Previous Treatment --    General Comment   Comments --    Subjective   Subjective --    Pain Screening   Patient Currently in Pain --    Pain Assessment   Pain Assessment --    Pain Level --    Patient's Stated Pain Goal --    Vital Signs   Temp --    Temp Source --    Pulse --    Heart Rate Source --    Resp --    BP --    BP Location --    BP Upper/Lower --    Patient Position --    Level of Consciousness --    MEWS Score --    Oxygen Therapy   SpO2 --    O2 Device --    Bed Mobility   Rolling --    Supine to Sit --    Scooting --    Transfers   Sit to Stand --    Stand to sit --    Bed to Chair --    Stand Pivot Transfers --    Ambulation   Ambulation?  --    Ambulation 1   Surface --    Other Apparatus --    Quality of Gait --    Distance --    Comments --    Other exercises   Other exercises 1 --    Conditions Requiring Skilled Therapeutic Intervention   Body structures, Functions, Activity limitations --    Assessment --    Timed Code Treatment Minutes 60 Minutes   Activity Tolerance   Activity Tolerance --    Electronically signed by Kassidy Nayak PT on 1/17/2018 at 4:02 PM

## 2018-01-18 LAB
BASOPHILS ABSOLUTE: 0 K/UL (ref 0–0.2)
BASOPHILS RELATIVE PERCENT: 0.4 % (ref 0–1)
EOSINOPHILS ABSOLUTE: 0.6 K/UL (ref 0–0.6)
EOSINOPHILS RELATIVE PERCENT: 8 % (ref 0–5)
HCT VFR BLD CALC: 36.1 % (ref 37–47)
HEMOGLOBIN: 12 G/DL (ref 12–16)
LYMPHOCYTES ABSOLUTE: 2.5 K/UL (ref 1.1–4.5)
LYMPHOCYTES RELATIVE PERCENT: 33.4 % (ref 20–40)
MCH RBC QN AUTO: 32.9 PG (ref 27–31)
MCHC RBC AUTO-ENTMCNC: 33.2 G/DL (ref 33–37)
MCV RBC AUTO: 98.9 FL (ref 81–99)
MONOCYTES ABSOLUTE: 0.6 K/UL (ref 0–0.9)
MONOCYTES RELATIVE PERCENT: 8.4 % (ref 0–10)
NEUTROPHILS ABSOLUTE: 3.7 K/UL (ref 1.5–7.5)
NEUTROPHILS RELATIVE PERCENT: 49.4 % (ref 50–65)
PDW BLD-RTO: 14.1 % (ref 11.5–14.5)
PLATELET # BLD: 253 K/UL (ref 130–400)
PMV BLD AUTO: 10.1 FL (ref 9.4–12.3)
RBC # BLD: 3.65 M/UL (ref 4.2–5.4)
WBC # BLD: 7.5 K/UL (ref 4.8–10.8)

## 2018-01-18 PROCEDURE — 1180000000 HC REHAB R&B

## 2018-01-18 PROCEDURE — 97110 THERAPEUTIC EXERCISES: CPT

## 2018-01-18 PROCEDURE — 85025 COMPLETE CBC W/AUTO DIFF WBC: CPT

## 2018-01-18 PROCEDURE — 92526 ORAL FUNCTION THERAPY: CPT

## 2018-01-18 PROCEDURE — 92507 TX SP LANG VOICE COMM INDIV: CPT

## 2018-01-18 PROCEDURE — 97530 THERAPEUTIC ACTIVITIES: CPT

## 2018-01-18 PROCEDURE — 97535 SELF CARE MNGMENT TRAINING: CPT

## 2018-01-18 PROCEDURE — 97116 GAIT TRAINING THERAPY: CPT

## 2018-01-18 PROCEDURE — 36415 COLL VENOUS BLD VENIPUNCTURE: CPT

## 2018-01-18 PROCEDURE — 99232 SBSQ HOSP IP/OBS MODERATE 35: CPT | Performed by: PSYCHIATRY & NEUROLOGY

## 2018-01-18 PROCEDURE — 6370000000 HC RX 637 (ALT 250 FOR IP): Performed by: INTERNAL MEDICINE

## 2018-01-18 PROCEDURE — 6360000002 HC RX W HCPCS: Performed by: PSYCHIATRY & NEUROLOGY

## 2018-01-18 RX ADMIN — GABAPENTIN 300 MG: 300 CAPSULE ORAL at 20:26

## 2018-01-18 RX ADMIN — PRAVASTATIN SODIUM 40 MG: 20 TABLET ORAL at 20:26

## 2018-01-18 RX ADMIN — FLUTICASONE PROPIONATE 1 SPRAY: 50 SPRAY, METERED NASAL at 09:22

## 2018-01-18 RX ADMIN — OLANZAPINE 5 MG: 5 TABLET, FILM COATED ORAL at 20:26

## 2018-01-18 RX ADMIN — FLUOXETINE HYDROCHLORIDE 20 MG: 20 CAPSULE ORAL at 09:22

## 2018-01-18 RX ADMIN — GABAPENTIN 300 MG: 300 CAPSULE ORAL at 09:21

## 2018-01-18 RX ADMIN — ENOXAPARIN SODIUM 40 MG: 40 INJECTION SUBCUTANEOUS at 20:26

## 2018-01-18 RX ADMIN — GABAPENTIN 300 MG: 300 CAPSULE ORAL at 15:26

## 2018-01-18 RX ADMIN — ASPIRIN 81 MG: 81 TABLET, COATED ORAL at 09:22

## 2018-01-18 NOTE — PROGRESS NOTES
Patient:   Kevin Mcdonough  MR#:    [id number]   Room:    12/812-01   YOB: 1965  Date of Progress Note: 1/18/2018  Time of Note                           10:48 AM  Consulting Physician:   Becky Burk M.D. Attending Physician:  Becky Burk MD     Chief complaint stroke/abnormal brain scan    S:This 50 y.o. female  who presented to [facility] ER on 1/1/8 with c/o left sided weakness & facial drooping,right-sided numbness,dizziness, dysarthria that had started about 1 week prior. CT of head done in ER revealed right frontal lobe hypodensities are most consistent with  nonhemorrhagic subacute ischemia of the right middle cerebral artery distribution. She was admitted for further treatment with consult for neurology. MRI of brain done revealed seeral areas of small acute inarction in the right hemisphere,as well as a 17x42 mm enhancing lesion on the right basal ganglia. She was seen by Neurologist Dr. Shweta Evangelista who felt the infarcts were consistent with embolic process. Cardiology was consulted for evaluation WAQAR and loop recorder. She was seen by Dr. Zohreh Briceño and taken for WAQAR and loop recorder placement on 1/4/18. She tolerated the procedure well. Neurosurgery was consulted to evaluate lesion. She was seen by Dr. Tigre Bradley. Dr. Leija Else believed that the findings of the MRI are consistent with an ischemic event followed by a small amount of hemorrhage that is resulting in cerebral edema, however, the possibility of an underlying mass is not entirely excluded. He will follow her very closely to watch the dynamics of the mass. He felt that a biopsy of a deep cerebral lesion that could possibly be a stroke poses more risk than benefit at this time and recommended repeat MRI brain in 4-6 weeks with an outpatient follow up. She continues to have left sided weakness,dysarthria and dysphasia. She was evaluated by SPT therapy and is currently on mechanical soft diet with nectar thick liquids.  She is

## 2018-01-18 NOTE — PROGRESS NOTES
swallowing precautions. SHORT TERM GOAL #1:  Goal 1: Goal 1: Patient will tolerate mechanical soft consistency and nectar thick liquids with min S/S penetration/aspiration during PO intake. not met    SHORT TERM GOAL #2:  Goal 2: Patient staff will follow swallow safety recommendations to decrease risk of penetration/aspiration during PO intake. not met    SHORT TERM GOAL #3:  Goal 3: Patient will complete oral motor, lingual, and pharyngeal strengthening exercises with provision of min cues/prompts. not met    SHORT TERM GOAL #4:  Goal 4: Re-assessment of swallowing function for potential diet upgrade. not met    SHORT TERM GOAL #5:  Goal 5: Patient will utilize tools/strategies to promote increased clarity of speech at word, phrase, and sentence level at 70% of opportunities. not met    Swallowing Short Term Goals  Short-term Goals  Timeframe for Short-term Goals: 1-2x/day for 1 wk  Goal 1: Patient will tolerate mechanical soft consistency and nectar thick liquids with min S/S penetration/aspiration during PO intake. Goal 2: Patient staff will follow swallow safety recommendations to decrease risk of penetration/aspiration during PO intake. Goal 3: Patient will complete oral motor, lingual, and pharyngeal strengthening exercises with provision of min cues/prompts. Goal 4: Re-assessment of swallowing function for potential diet upgrade. Goal 5: Patient will utilize tools/strategies to promote increased clarity of speech at word, phrase, and sentence level at 70% of opportunities.      Comprehension: 4 - Patient understands basic needs 75-90%+ of the time  Expression: 4 - Expresses basic ideas/needs 75-90%+ of the time  Social Interaction: 5 - Patient is appropriate with supervision/cues  Problem Solvin - Patient solves simple/routine tasks 75-90%+   Memory: 4 - Patient remembers 75-90%+ of the time    ASSESSMENT:  Assessment: [x]Progressing towards goals          []Not Progressing towards

## 2018-01-19 PROCEDURE — 97535 SELF CARE MNGMENT TRAINING: CPT

## 2018-01-19 PROCEDURE — 92526 ORAL FUNCTION THERAPY: CPT

## 2018-01-19 PROCEDURE — 1180000000 HC REHAB R&B

## 2018-01-19 PROCEDURE — 97110 THERAPEUTIC EXERCISES: CPT

## 2018-01-19 PROCEDURE — 99232 SBSQ HOSP IP/OBS MODERATE 35: CPT | Performed by: PSYCHIATRY & NEUROLOGY

## 2018-01-19 PROCEDURE — 92507 TX SP LANG VOICE COMM INDIV: CPT

## 2018-01-19 PROCEDURE — 6370000000 HC RX 637 (ALT 250 FOR IP): Performed by: INTERNAL MEDICINE

## 2018-01-19 PROCEDURE — 97530 THERAPEUTIC ACTIVITIES: CPT

## 2018-01-19 PROCEDURE — 97116 GAIT TRAINING THERAPY: CPT

## 2018-01-19 PROCEDURE — 6360000002 HC RX W HCPCS: Performed by: PSYCHIATRY & NEUROLOGY

## 2018-01-19 RX ADMIN — ASPIRIN 81 MG: 81 TABLET, COATED ORAL at 08:08

## 2018-01-19 RX ADMIN — FLUOXETINE HYDROCHLORIDE 20 MG: 20 CAPSULE ORAL at 08:08

## 2018-01-19 RX ADMIN — PRAVASTATIN SODIUM 40 MG: 20 TABLET ORAL at 20:55

## 2018-01-19 RX ADMIN — ENOXAPARIN SODIUM 40 MG: 40 INJECTION SUBCUTANEOUS at 20:55

## 2018-01-19 RX ADMIN — GABAPENTIN 300 MG: 300 CAPSULE ORAL at 08:08

## 2018-01-19 RX ADMIN — GABAPENTIN 300 MG: 300 CAPSULE ORAL at 14:02

## 2018-01-19 RX ADMIN — GABAPENTIN 300 MG: 300 CAPSULE ORAL at 20:55

## 2018-01-19 RX ADMIN — OLANZAPINE 5 MG: 5 TABLET, FILM COATED ORAL at 20:55

## 2018-01-19 RX ADMIN — FLUTICASONE PROPIONATE 1 SPRAY: 50 SPRAY, METERED NASAL at 08:08

## 2018-01-19 ASSESSMENT — PAIN DESCRIPTION - LOCATION: LOCATION: NECK

## 2018-01-19 ASSESSMENT — PAIN DESCRIPTION - PAIN TYPE: TYPE: ACUTE PAIN

## 2018-01-19 ASSESSMENT — PAIN SCALES - GENERAL
PAINLEVEL_OUTOF10: 0
PAINLEVEL_OUTOF10: 5

## 2018-01-19 ASSESSMENT — PAIN DESCRIPTION - ONSET: ONSET: ON-GOING

## 2018-01-19 ASSESSMENT — PAIN DESCRIPTION - PROGRESSION: CLINICAL_PROGRESSION: NOT CHANGED

## 2018-01-19 ASSESSMENT — PAIN DESCRIPTION - DESCRIPTORS: DESCRIPTORS: ACHING

## 2018-01-19 ASSESSMENT — PAIN DESCRIPTION - FREQUENCY: FREQUENCY: CONTINUOUS

## 2018-01-20 PROCEDURE — 6360000002 HC RX W HCPCS: Performed by: PSYCHIATRY & NEUROLOGY

## 2018-01-20 PROCEDURE — 1180000000 HC REHAB R&B

## 2018-01-20 PROCEDURE — 6370000000 HC RX 637 (ALT 250 FOR IP): Performed by: INTERNAL MEDICINE

## 2018-01-20 PROCEDURE — 99232 SBSQ HOSP IP/OBS MODERATE 35: CPT | Performed by: PSYCHIATRY & NEUROLOGY

## 2018-01-20 PROCEDURE — 6370000000 HC RX 637 (ALT 250 FOR IP): Performed by: PSYCHIATRY & NEUROLOGY

## 2018-01-20 RX ORDER — MAGNESIUM HYDROXIDE/ALUMINUM HYDROXICE/SIMETHICONE 120; 1200; 1200 MG/30ML; MG/30ML; MG/30ML
30 SUSPENSION ORAL EVERY 6 HOURS PRN
Status: DISCONTINUED | OUTPATIENT
Start: 2018-01-20 | End: 2018-01-29 | Stop reason: HOSPADM

## 2018-01-20 RX ADMIN — GABAPENTIN 300 MG: 300 CAPSULE ORAL at 13:59

## 2018-01-20 RX ADMIN — PRAVASTATIN SODIUM 40 MG: 20 TABLET ORAL at 20:33

## 2018-01-20 RX ADMIN — ALUMINUM HYDROXIDE, MAGNESIUM HYDROXIDE, AND SIMETHICONE 30 ML: 200; 200; 20 SUSPENSION ORAL at 14:58

## 2018-01-20 RX ADMIN — OLANZAPINE 5 MG: 5 TABLET, FILM COATED ORAL at 20:33

## 2018-01-20 RX ADMIN — GABAPENTIN 300 MG: 300 CAPSULE ORAL at 08:08

## 2018-01-20 RX ADMIN — ENOXAPARIN SODIUM 40 MG: 40 INJECTION SUBCUTANEOUS at 20:33

## 2018-01-20 RX ADMIN — ASPIRIN 81 MG: 81 TABLET, COATED ORAL at 08:08

## 2018-01-20 RX ADMIN — GABAPENTIN 300 MG: 300 CAPSULE ORAL at 20:33

## 2018-01-20 RX ADMIN — FLUTICASONE PROPIONATE 1 SPRAY: 50 SPRAY, METERED NASAL at 08:09

## 2018-01-20 RX ADMIN — FLUOXETINE HYDROCHLORIDE 20 MG: 20 CAPSULE ORAL at 08:09

## 2018-01-20 NOTE — PROGRESS NOTES
participating in PT/OT as well. She currently lives with her  at [facility]. She is felt to need a stay on Rehab for continue PT/OT/SPT before for she will be ready to return ther with her . She is now felt ready to start the Rehab program. No acute issues overnight.         REVIEW OF SYSTEMS:  Constitutional: No fevers No chills  Neck:No stiffness  Respiratory: No shortness of breath  Cardiovascular: No chest pain No palpitations  Gastrointestinal: No abdominal pain    Genitourinary: No Dysuria  Neurological: No headache, no confusion    Past Medical History:      Diagnosis Date    Anxiety     Cryptogenic stroke ([facility] [address]     Depression     Hypertension     Status post placement of implantable loop recorder 2018       Past Surgical History:      Procedure Laterality Date     SECTION      x2    CHOLECYSTECTOMY      COSMETIC SURGERY      HAND SURGERY         Medications in Hospital:      Current Facility-Administered Medications:     acetaminophen (TYLENOL) tablet 650 mg, 650 mg, Oral, Q4H PRN, Atlee Blase Carrascoeo, DO    magnesium hydroxide (MILK OF MAGNESIA) 400 MG/5ML suspension 30 mL, 30 mL, Oral, Daily PRN, Atlmele Carrascoeo, DO, 30 mL at 18    aspirin EC tablet 81 mg, 81 mg, Oral, Daily, Brett Less, DO, 81 mg at 18 0808    FLUoxetine (PROZAC) capsule 20 mg, 20 mg, Oral, Daily, Atlee Tari Blanche, DO, 20 mg at 18 0809    fluticasone (FLONASE) 50 MCG/ACT nasal spray 1 spray, 1 spray, Nasal, Daily, Wanda Mancia DO, 1 spray at 18 08    gabapentin (NEURONTIN) capsule 300 mg, 300 mg, Oral, TID, Atlee Blase Blanche, DO, 300 mg at 18 08    hydrOXYzine (VISTARIL) capsule 25 mg, 25 mg, Oral, TID PRN, Atlmele Carrascoeo, DO    OLANZapine (ZYPREXA) tablet 5 mg, 5 mg, Oral, Nightly, Atlee Bla Blanche, DO, 5 mg at 18    pravastatin (PRAVACHOL) tablet 40 mg, 40 mg, Oral, Nightly, Atlee Blase Carrascoeo, DO, 40 mg at 18 2055    docusate sodium (COLACE) capsule 100 mg, 100 mg, Oral, BID PRN, Earlene Andres MD, 100 mg at 01/08/18 2058    bisacodyl (DULCOLAX) suppository 10 mg, 10 mg, Rectal, Daily PRN, Earlene Andres MD    enoxaparin (LOVENOX) injection 40 mg, 40 mg, Subcutaneous, Nightly, Earlene Andres MD, 40 mg at 01/19/18 2055    Allergies:  Penicillins    Social History:   TOBACCO:   reports that she has been smoking. She has been smoking about 0.50 packs per day. She has never used smokeless tobacco.  ETOH:   reports that she drinks alcohol. Family History:       Problem Relation Age of Onset    Mental Illness Mother     Diabetes Mother     Cancer Mother     Mental Illness Father          PHYSICAL EXAM:  /84   Pulse 80   Temp 97.8 °F (36.6 °C) (Temporal)   Resp 20   Ht 5' 2\" (1.575 m)   Wt 198 lb 0.2 oz (89.8 kg)   SpO2 94%   BMI 36.22 kg/m²     Constitutional: she appears well-developed and well-nourished. Eyes - conjunctiva normal.    Ear, nose, throat - No scars, masses, or lesions over external nose or ears, no atrophy of tongue  Neck-symmetric, no masses noted, no jugular vein distension  Respiration- chest wall appears symmetric, good expansion,   normal effort without use of accessory muscles  Musculoskeletal - no significant wasting of muscles noted, no bony deformities Extremities-no clubbing, cyanosis or edema  Skin - warm, dry, and intact. No rash, erythema, or pallor.   Psychiatric - mood, affect, and behavior appear normal.      Neurology  NEUROLOGICAL EXAM:      Mental status   Awake, alert, fluent oriented x 3 appropriate affect  Attention and concentration appear appropriate  Recent and remote memory appears unremarkable  Speech normal without dysarthria  No clear issues with language       Cranial Nerves   CN III, IV,VI-EOMI, No nystagmus, conjugate eye movements, no ptosis    CN VII-left facial droop           Motor function  Antigravity x 4 drift left     Sensory function

## 2018-01-21 PROCEDURE — 1180000000 HC REHAB R&B

## 2018-01-21 PROCEDURE — 6360000002 HC RX W HCPCS: Performed by: PSYCHIATRY & NEUROLOGY

## 2018-01-21 PROCEDURE — 6370000000 HC RX 637 (ALT 250 FOR IP): Performed by: INTERNAL MEDICINE

## 2018-01-21 PROCEDURE — 99232 SBSQ HOSP IP/OBS MODERATE 35: CPT | Performed by: PSYCHIATRY & NEUROLOGY

## 2018-01-21 RX ADMIN — GABAPENTIN 300 MG: 300 CAPSULE ORAL at 20:49

## 2018-01-21 RX ADMIN — PRAVASTATIN SODIUM 40 MG: 20 TABLET ORAL at 20:49

## 2018-01-21 RX ADMIN — OLANZAPINE 5 MG: 5 TABLET, FILM COATED ORAL at 20:49

## 2018-01-21 RX ADMIN — ENOXAPARIN SODIUM 40 MG: 40 INJECTION SUBCUTANEOUS at 20:49

## 2018-01-21 RX ADMIN — ASPIRIN 81 MG: 81 TABLET, COATED ORAL at 08:15

## 2018-01-21 RX ADMIN — GABAPENTIN 300 MG: 300 CAPSULE ORAL at 13:50

## 2018-01-21 RX ADMIN — FLUTICASONE PROPIONATE 1 SPRAY: 50 SPRAY, METERED NASAL at 08:15

## 2018-01-21 RX ADMIN — FLUOXETINE HYDROCHLORIDE 20 MG: 20 CAPSULE ORAL at 08:15

## 2018-01-21 RX ADMIN — GABAPENTIN 300 MG: 300 CAPSULE ORAL at 08:15

## 2018-01-21 NOTE — PROGRESS NOTES
Nightly, Yonypaul Ritesh Chavezo, DO, 5 mg at 01/20/18 2033    pravastatin (PRAVACHOL) tablet 40 mg, 40 mg, Oral, Nightly, Zoraida Arenas, DO, 40 mg at 01/20/18 2033    docusate sodium (COLACE) capsule 100 mg, 100 mg, Oral, BID PRN, Nicolle Gutierrez MD, 100 mg at 01/08/18 2058    bisacodyl (DULCOLAX) suppository 10 mg, 10 mg, Rectal, Daily PRN, Nicolle Gutierrez MD    enoxaparin (LOVENOX) injection 40 mg, 40 mg, Subcutaneous, Nightly, Nicolle Gutierrez MD, 40 mg at 01/20/18 2033    Allergies:  Penicillins    Social History:   TOBACCO:   reports that she has been smoking. She has been smoking about 0.50 packs per day. She has never used smokeless tobacco.  ETOH:   reports that she drinks alcohol. Family History:       Problem Relation Age of Onset    Mental Illness Mother     Diabetes Mother     Cancer Mother     Mental Illness Father          PHYSICAL EXAM:  /67   Pulse 82   Temp 97.6 °F (36.4 °C) (Temporal)   Resp 18   Ht 5' 2\" (1.575 m)   Wt 198 lb 0.2 oz (89.8 kg)   SpO2 92%   BMI 36.22 kg/m²     Constitutional: she appears well-developed and well-nourished. Eyes - conjunctiva normal.    Ear, nose, throat - No scars, masses, or lesions over external nose or ears, no atrophy of tongue  Neck-symmetric, no masses noted, no jugular vein distension  Respiration- chest wall appears symmetric, good expansion,   normal effort without use of accessory muscles  Musculoskeletal - no significant wasting of muscles noted, no bony deformities Extremities-no clubbing, cyanosis or edema  Skin - warm, dry, and intact. No rash, erythema, or pallor.   Psychiatric - mood, affect, and behavior appear normal.      Neurology  NEUROLOGICAL EXAM:      Mental status   Awake, alert, fluent oriented x 3 appropriate affect  Attention and concentration appear appropriate  Recent and remote memory appears unremarkable  Speech normal without dysarthria  No clear issues with language       Cranial Nerves   CN III, IV,VI-EOMI, No nystagmus, conjugate eye movements, no ptosis    CN VII-left facial droop           Motor function  Antigravity x 4 drift left     Sensory function      Cerebellar      Tremor None present     Gait                  Not tested           Nursing/pcp notes, imaging,labs and vitals reviewed. PT,OT and/or speech notes reviewed    Lab Results   Component Value Date    WBC 7.5 01/18/2018    HGB 12.0 01/18/2018    HCT 36.1 (L) 01/18/2018    MCV 98.9 01/18/2018     01/18/2018     Lab Results   Component Value Date     01/06/2018    K 3.8 01/06/2018     01/06/2018    CO2 28 01/06/2018    BUN 12 01/06/2018    CREATININE 0.7 01/06/2018    GLUCOSE 97 01/06/2018    CALCIUM 9.1 01/06/2018    PROT 6.7 01/06/2018    LABALBU 3.7 01/06/2018    BILITOT <0.2 01/06/2018    ALKPHOS 63 01/06/2018    AST 16 01/06/2018    ALT 19 01/06/2018    LABGLOM >60 01/06/2018    GLOB 3.0 11/12/2016     Lab Results   Component Value Date    INR 1.03 01/01/2018    PROTIME 13.4 01/01/2018             RECORD REVIEW: Previous medical records, medications were reviewed at today's visit    IMPRESSION:   1. Stroke-on aspirin/statin-cardioembolic status post loop recorder/WAQAR  2. Abnormal brain scan- repeat MRI of the brain in 4-6 weeks  3. Mood disorder-on medications continue monitor  4. DVT prophylaxis-on Lovenox  5. Allergies Flonase  6. Hyperlipidemia-on statin  7.  PT/OT/speech    Continue medical care  LIBBY cooper        CALL WITH ANY QUESTIONS  [phone] CELL  Dr Joe Andersen

## 2018-01-22 LAB
BASOPHILS ABSOLUTE: 0 K/UL (ref 0–0.2)
BASOPHILS RELATIVE PERCENT: 0.6 % (ref 0–1)
EOSINOPHILS ABSOLUTE: 0.5 K/UL (ref 0–0.6)
EOSINOPHILS RELATIVE PERCENT: 6.7 % (ref 0–5)
HCT VFR BLD CALC: 37.6 % (ref 37–47)
HEMOGLOBIN: 12.5 G/DL (ref 12–16)
LYMPHOCYTES ABSOLUTE: 2.6 K/UL (ref 1.1–4.5)
LYMPHOCYTES RELATIVE PERCENT: 38.8 % (ref 20–40)
MCH RBC QN AUTO: 33 PG (ref 27–31)
MCHC RBC AUTO-ENTMCNC: 33.2 G/DL (ref 33–37)
MCV RBC AUTO: 99.2 FL (ref 81–99)
MONOCYTES ABSOLUTE: 0.7 K/UL (ref 0–0.9)
MONOCYTES RELATIVE PERCENT: 10 % (ref 0–10)
NEUTROPHILS ABSOLUTE: 2.9 K/UL (ref 1.5–7.5)
NEUTROPHILS RELATIVE PERCENT: 43.6 % (ref 50–65)
PDW BLD-RTO: 14.3 % (ref 11.5–14.5)
PLATELET # BLD: 259 K/UL (ref 130–400)
PMV BLD AUTO: 10.2 FL (ref 9.4–12.3)
RBC # BLD: 3.79 M/UL (ref 4.2–5.4)
WBC # BLD: 6.7 K/UL (ref 4.8–10.8)

## 2018-01-22 PROCEDURE — 85025 COMPLETE CBC W/AUTO DIFF WBC: CPT

## 2018-01-22 PROCEDURE — 97110 THERAPEUTIC EXERCISES: CPT

## 2018-01-22 PROCEDURE — 6370000000 HC RX 637 (ALT 250 FOR IP): Performed by: INTERNAL MEDICINE

## 2018-01-22 PROCEDURE — 99232 SBSQ HOSP IP/OBS MODERATE 35: CPT | Performed by: PSYCHIATRY & NEUROLOGY

## 2018-01-22 PROCEDURE — 92507 TX SP LANG VOICE COMM INDIV: CPT

## 2018-01-22 PROCEDURE — 36415 COLL VENOUS BLD VENIPUNCTURE: CPT

## 2018-01-22 PROCEDURE — 6360000002 HC RX W HCPCS: Performed by: PSYCHIATRY & NEUROLOGY

## 2018-01-22 PROCEDURE — 97535 SELF CARE MNGMENT TRAINING: CPT

## 2018-01-22 PROCEDURE — 1180000000 HC REHAB R&B

## 2018-01-22 PROCEDURE — 97116 GAIT TRAINING THERAPY: CPT

## 2018-01-22 PROCEDURE — 92526 ORAL FUNCTION THERAPY: CPT

## 2018-01-22 PROCEDURE — 97530 THERAPEUTIC ACTIVITIES: CPT

## 2018-01-22 RX ADMIN — GABAPENTIN 300 MG: 300 CAPSULE ORAL at 08:12

## 2018-01-22 RX ADMIN — GABAPENTIN 300 MG: 300 CAPSULE ORAL at 14:13

## 2018-01-22 RX ADMIN — OLANZAPINE 5 MG: 5 TABLET, FILM COATED ORAL at 20:40

## 2018-01-22 RX ADMIN — ENOXAPARIN SODIUM 40 MG: 40 INJECTION SUBCUTANEOUS at 20:40

## 2018-01-22 RX ADMIN — FLUOXETINE HYDROCHLORIDE 20 MG: 20 CAPSULE ORAL at 08:12

## 2018-01-22 RX ADMIN — ASPIRIN 81 MG: 81 TABLET, COATED ORAL at 08:12

## 2018-01-22 RX ADMIN — FLUTICASONE PROPIONATE 1 SPRAY: 50 SPRAY, METERED NASAL at 08:12

## 2018-01-22 RX ADMIN — GABAPENTIN 300 MG: 300 CAPSULE ORAL at 20:40

## 2018-01-22 RX ADMIN — PRAVASTATIN SODIUM 40 MG: 20 TABLET ORAL at 20:40

## 2018-01-22 ASSESSMENT — PAIN SCALES - GENERAL: PAINLEVEL_OUTOF10: 0

## 2018-01-22 NOTE — PROGRESS NOTES
continues to take large bites at a fast pace and requires cues to clear oral residue and food from the buccal cavity. Recommend:   Continue dysphagia II diet  (extra sauces/gravies)  Nectar thick liquids   Meds whole in pudding or applesauce  If patient receives mouth care prior to intake, okay for thin H2O in between meals for comfort      Precautions:  Small bites and sips  Upright for all oral intake  Remain upright for 30 minutes following all meals  Utilize lingual search/sweep during and following all meals  Alternate liquids and foods              SHORT TERM GOAL #1:  Goal 1: Goal 1: Patient will tolerate mechanical soft consistency and nectar thick liquids with min S/S penetration/aspiration during PO intake. not met    SHORT TERM GOAL #2:  Goal 2: Patient staff will follow swallow safety recommendations to decrease risk of penetration/aspiration during PO intake. not met    SHORT TERM GOAL #3:  Goal 3: Patient will complete oral motor, lingual, and pharyngeal strengthening exercises with provision of min cues/prompts. not met    SHORT TERM GOAL #4:  Goal 4: Re-assessment of swallowing function for potential diet upgrade. not met    SHORT TERM GOAL #5:  Goal 5: Patient will utilize tools/strategies to promote increased clarity of speech at word, phrase, and sentence level at 70% of opportunities. not met     STGs Met: 0  LTGs Met: 0    LONG TERM GOALS:    The patient will demonstrate improvement in motor speech to effectively communicate with caregivers,staff and with members of the community. The patient will tolerate the safest least restrictive diet prior to discharge. ELOS: 2-3 weeks    Swallowing Short Term Goals  Short-term Goals  Timeframe for Short-term Goals: 1-2x/day for 1 wk  Goal 1: Patient will tolerate mechanical soft consistency and nectar thick liquids with min S/S penetration/aspiration during PO intake.   Goal 2: Patient staff will follow swallow safety recommendations to decrease risk of penetration/aspiration during PO intake. Goal 3: Patient will complete oral motor, lingual, and pharyngeal strengthening exercises with provision of min cues/prompts. Goal 4: Re-assessment of swallowing function for potential diet upgrade. Goal 5: Patient will utilize tools/strategies to promote increased clarity of speech at word, phrase, and sentence level at 70% of opportunities. Comprehension: 4 - Patient understands basic needs 75-90%+ of the time  Expression: 4 - Expresses basic ideas/needs 75-90%+ of the time  Social Interaction: 5 - Patient is appropriate with supervision/cues  Problem Solvin - Patient solves simple/routine tasks 75-90%+   Memory: 4 - Patient remembers 75-90%+ of the time    ASSESSMENT:  Assessment: [x]Progressing towards goals          []Not Progressing towards goals    Patient Tolerance of Treatment:   [x]Tolerated well []Tolerated fair []Required rest breaks []Fatigued    Education:  Learner:  [x]Patient          []Significant Other          []Other  Education provided regarding:  [x]Goals and POC   []Diet and swallowing precautions    []Home Exercise Program  []Progress and/or discharge information  Method of Education:  [x]Discussion          []Demonstration          []Handout          []Other  Evaluation of Education:   []Verbalized understanding   []Demonstrates without assistance  []Demonstrates with assistance  []Needs further instruction     []No evidence of learning                  []No family present      Plan: [x]Continue with current plan of care    []Modify current plan of care as follows:    []Discharge patient    Discharge Location:    Services/Supervision Recommended:      [x]Patient continues to require treatment by a licensed therapist to address functional deficits as outlined in the established plan of care.           Electronically Signed By:  Giovanni Parker  2018,1:53 PM.

## 2018-01-22 NOTE — PATIENT CARE CONFERENCE
setup/supervision/cues to perform tube feedings Eatin - Feeds self with setup/supervision/cues and/or requires only setup/supervision/cues to perform tube feedings GOAL: Eatin   GROOMING Groomin - Requires setup/cues to do all tasks Groomin - Requires setup/cues to do all tasks GOAL: Groomin   BATHING Bathing: 3 - Able to bathe 5-7 areas Bathin - Able to bathe 8-9 areas GOAL: Bathin   UPPER EXTREMITY DRESSING Dressing-Upper: 4 - Requires assist with buttons/zippers only and/or requires assist with one arm only (assistance with left sleeve) Dressing-Upper: 5 - Requires setup/supervision/cues and/or requires assist with presthesis/brace only (increased time, vc on tech) GOAL: Dressing-Upper: 7   LOWER EXTREMITY DRESSING Dressing-Lower: 2 - Requires assist with 4-5 parts of dressing Dressing-Lower: 4 - Requires assist with buttons/zippers/shoelaces and/or assist with shoes only (tight fit clothing) GOAL: Dressing-Lower: 6   TOILETING Toiletin - Able to perform 1 task only (e.g. hygiene) (assistance with pants up/down) Toiletin - Requires steadying assistance only GOAL: Toiletin   TOILET TRANSFER Toilet Transfer: 3 - Requires 25-49% assist getting off toilet (w/c to toilet, GB) Toilet Transfer: 4 - Requires steadying assistance only < 25% assist GOAL: Toilet: 6   TUB/SHOWER TRANSFER Primary Mode: Shower     Shower Transfer: 0 - Activity does not occur   Primary Mode: Shower     Shower Transfer: 4 - Minimal contact assistance, pt. expends 75% or more effort (shower chair)     Primary Mode: Shower  GOAL: Tub, Shower: 6         Cognition:  Comprehension: 5 - Patient understands basic needs (hungry/hot/pain)  Expression: 7 - Patient expresses complex ideas/needs  Social Interaction: 6 - Patient requires medication for mood and/or effect  Problem Solvin - Patient able to solve simple/routine tasks  Memory: 4 - Patient remembers 75-90%+ of the time    UE Functioning:  BUE AROM WNLs, decrease coordination and FM    Pain Assessment:  No reported pain  STGs:  Short term goals  Time Frame for Short term goals: 1 week  Short term goal 1: met  Short term goal 2: met  Short term goal 3: met  Short term goal 4: met  Short term goal 5: MET  Short term goal 6: met  Short term goal 7: met  7/7 met  LTGs:  Long term goals  Time Frame for Long term goals : 3 weeks  Long term goal 1: complete ambulatory home making task with modified independence`  Long term goal 2: complete overall toileting with modified independence  Long term goal 3: complete overall dressing with modified independence  Long term goal 4: complete HEP with independence  Long term goal 5: complete overall bathing with modified independence  Long term goals 6: verbalize DME  0/6 met  Assessment:  Decreased functional mobility ; Decreased coordination; Decreased ADL status; Decreased balance; Decreased strength; Decreased safe awareness; Decreased high-level IADLs; Decreased fine motor control        NUTRITION  Current Wt: Weight: 198 lb 0.2 oz (89.8 kg) / Body mass index is 36.22 kg/m². Admission Wt: Admission Body Weight: 181 lb (82.1 kg)  Oral Diet Orders: Dysphagia 2, [place] Thick   Oral Nutrition Supplement (ONS) Orders: None   PO %  Please see nutrition note for details. NURSING    FIMS:  Bladder  Level of Assistance: Bladder Level of Assistance: 4- Minimal Assistance (Subject equal 75% or more)  Frequency of Accidents: Bladder Frequency of Accidents: 6 - No accidents: uses device    Bowel  Level of Assistance: Bowel Level of Assistance: 4- Minimal Assistance (Subject equal 75% or more)  Frequency of Accidents:  Bowel Frequency of Accidents: 6 - No accidents: uses device    Wounds/Incisions/Ulcers: No skin issues identified     Ronaldo Scale Score: 19    Pain: No pain concerns to address (no prn pain medication; gabapentin scheduled)    Consultations/Labs/X-rays:     Family Education: Family available and participating in education     Fall Risk:  Dunaway Score: 72    Fall in the last week? Other Nursing Issues: pleasant and cooperative, always expresses how grateful she is for care        SOCIAL WORK/CASE MANAGEMENT  Assessment: She and  have had many struggles in past several months, she is under care of [facility] outpatient, they are also assisting them with looking for appropriate housing and have Section 8 voucher. Records have been gathered for Disability application- initiating that on her behalf. They will return to single family dwelling at [facility] Discharge Plan   Estimated Length of Stay: 1 week  Destination: home health    Pass: No    Services at Discharge: [address], Occupational Therapy, Speech Therapy and Nursing per evaluations  Equipment at Discharge: wheelchair, walker, bath bench and bedside commode    Progress made in the prior week: 1. Assist required for amb has decreased  2.  3.  4.  5.      Goals for following week: 1. Transfer bed to chair to contact guard level  2.   3.   4.   5.     Factors facilitating achievement of predicted outcomes: Family support    Barriers to the achievement of predicted outcomes: overall situation-  unemployed, living at shelter, applications initiated    Team Members Present at Conference:  : Shirley Stevens   Occupational Therapist: Greer Vivas OTR/L  Physical Therapist: CASSIE Hamilton  Speech Therapist: Nicole Horowitz Willis Rosin  Nurse: Emily Hancock, RN   Nurse Manager:  Emily Hancock, RN  Dietitian:  Manuel Bills MS, RD, LD  Rehab Director:  Finesse Pritchett approve the established interdisciplinary plan of care as documented within the medical record of Sajan Arguelles

## 2018-01-22 NOTE — PROGRESS NOTES
participating in PT/OT as well. She currently lives with her  at [facility]. She is felt to need a stay on Rehab for continue PT/OT/SPT before for she will be ready to return ther with her . She is now felt ready to start the Rehab program. No acute issues overnight.         REVIEW OF SYSTEMS:  Constitutional: No fevers No chills  Neck:No stiffness  Respiratory: No shortness of breath  Cardiovascular: No chest pain No palpitations  Gastrointestinal: No abdominal pain    Genitourinary: No Dysuria  Neurological: No headache, no confusion    Past Medical History:      Diagnosis Date    Anxiety     Cryptogenic stroke ([facility] [address]     Depression     Hypertension     Status post placement of implantable loop recorder 2018       Past Surgical History:      Procedure Laterality Date     SECTION      x2    CHOLECYSTECTOMY      COSMETIC SURGERY      HAND SURGERY         Medications in Hospital:      Current Facility-Administered Medications:     aluminum & magnesium hydroxide-simethicone (MAALOX) 200-200-20 MG/5ML suspension 30 mL, 30 mL, Oral, Q6H PRN, Sherly Hughes MD, 30 mL at 18 1458    acetaminophen (TYLENOL) tablet 650 mg, 650 mg, Oral, Q4H PRN, Elicia Larissa Carrascoeo, DO    magnesium hydroxide (MILK OF MAGNESIA) 400 MG/5ML suspension 30 mL, 30 mL, Oral, Daily PRN, Elicia Larissa Blanche, DO, 30 mL at 18 2058    aspirin EC tablet 81 mg, 81 mg, Oral, Daily, Elicia Dias Blnache, DO, 81 mg at 18 9204    FLUoxetine (PROZAC) capsule 20 mg, 20 mg, Oral, Daily, Elicia Dias Blanche, DO, 20 mg at 18 4088    fluticasone (FLONASE) 50 MCG/ACT nasal spray 1 spray, 1 spray, Nasal, Daily, Elicialyric Mancia DO, 1 spray at 18 0325    gabapentin (NEURONTIN) capsule 300 mg, 300 mg, Oral, TID, Elicialyric Carrascoeo, DO, 300 mg at 18 1123    hydrOXYzine (VISTARIL) capsule 25 mg, 25 mg, Oral, TID PRN, Elicia Mancia     OLANZapine (ZYPREXA) tablet 5 mg, 5 mg, Oral, Nightly, Herlinda Mancia, DO, 5 mg at 01/21/18 2049    pravastatin (PRAVACHOL) tablet 40 mg, 40 mg, Oral, Nightly, Chencho Herron, DO, 40 mg at 01/21/18 2049    docusate sodium (COLACE) capsule 100 mg, 100 mg, Oral, BID PRN, Omer Huffman MD, 100 mg at 01/08/18 2058    bisacodyl (DULCOLAX) suppository 10 mg, 10 mg, Rectal, Daily PRN, Omer Huffman MD    enoxaparin (LOVENOX) injection 40 mg, 40 mg, Subcutaneous, Nightly, Omer Huffman MD, 40 mg at 01/21/18 2049    Allergies:  Penicillins    Social History:   TOBACCO:   reports that she has been smoking. She has been smoking about 0.50 packs per day. She has never used smokeless tobacco.  ETOH:   reports that she drinks alcohol. Family History:       Problem Relation Age of Onset    Mental Illness Mother     Diabetes Mother     Cancer Mother     Mental Illness Father          PHYSICAL EXAM:  /74   Pulse 82   Temp 97.2 °F (36.2 °C) (Temporal)   Resp 16   Ht 5' 2\" (1.575 m)   Wt 198 lb 0.2 oz (89.8 kg)   SpO2 96%   BMI 36.22 kg/m²     Constitutional: she appears well-developed and well-nourished. Eyes - conjunctiva normal.    Ear, nose, throat - No scars, masses, or lesions over external nose or ears, no atrophy of tongue  Neck-symmetric, no masses noted, no jugular vein distension  Respiration- chest wall appears symmetric, good expansion,   normal effort without use of accessory muscles  Musculoskeletal - no significant wasting of muscles noted, no bony deformities Extremities-no clubbing, cyanosis or edema  Skin - warm, dry, and intact. No rash, erythema, or pallor.   Psychiatric - mood, affect, and behavior appear normal.      Neurology  NEUROLOGICAL EXAM:      Mental status   Awake, alert, fluent oriented x 3 appropriate affect  Attention and concentration appear appropriate  Recent and remote memory appears unremarkable  Speech normal without dysarthria  No clear issues with language       Cranial Nerves   CN III, IV,VI-EOMI, No nystagmus, conjugate eye movements, no ptosis    CN VII-left facial droop           Motor function  Antigravity x 4 drift left     Sensory function      Cerebellar      Tremor None present     Gait                  Not tested           Nursing/pcp notes, imaging,labs and vitals reviewed. PT,OT and/or speech notes reviewed    Lab Results   Component Value Date    WBC 6.7 01/22/2018    HGB 12.5 01/22/2018    HCT 37.6 01/22/2018    MCV 99.2 (H) 01/22/2018     01/22/2018     Lab Results   Component Value Date     01/06/2018    K 3.8 01/06/2018     01/06/2018    CO2 28 01/06/2018    BUN 12 01/06/2018    CREATININE 0.7 01/06/2018    GLUCOSE 97 01/06/2018    CALCIUM 9.1 01/06/2018    PROT 6.7 01/06/2018    LABALBU 3.7 01/06/2018    BILITOT <0.2 01/06/2018    ALKPHOS 63 01/06/2018    AST 16 01/06/2018    ALT 19 01/06/2018    LABGLOM >60 01/06/2018    GLOB 3.0 11/12/2016     Lab Results   Component Value Date    INR 1.03 01/01/2018    PROTIME 13.4 01/01/2018             RECORD REVIEW: Previous medical records, medications were reviewed at today's visit    IMPRESSION:   1. Stroke-on aspirin/statin-cardioembolic status post loop recorder/WAQRA  2. Abnormal brain scan- repeat MRI of the brain in 4-6 weeks  3. Mood disorder-on medications continue monitor  4. DVT prophylaxis-on Lovenox  5. Allergies Flonase  6. Hyperlipidemia-on statin  7.  PT/OT/speech    Continue present care  LIBBY cooper        CALL WITH ANY QUESTIONS  [phone] CELL  Dr Michael Haddad

## 2018-01-23 PROCEDURE — 99233 SBSQ HOSP IP/OBS HIGH 50: CPT | Performed by: PSYCHIATRY & NEUROLOGY

## 2018-01-23 PROCEDURE — 92526 ORAL FUNCTION THERAPY: CPT

## 2018-01-23 PROCEDURE — 97535 SELF CARE MNGMENT TRAINING: CPT

## 2018-01-23 PROCEDURE — 1180000000 HC REHAB R&B

## 2018-01-23 PROCEDURE — 97530 THERAPEUTIC ACTIVITIES: CPT

## 2018-01-23 PROCEDURE — 6360000002 HC RX W HCPCS: Performed by: PSYCHIATRY & NEUROLOGY

## 2018-01-23 PROCEDURE — 97110 THERAPEUTIC EXERCISES: CPT

## 2018-01-23 PROCEDURE — 97116 GAIT TRAINING THERAPY: CPT

## 2018-01-23 PROCEDURE — 6370000000 HC RX 637 (ALT 250 FOR IP): Performed by: INTERNAL MEDICINE

## 2018-01-23 RX ADMIN — OLANZAPINE 5 MG: 5 TABLET, FILM COATED ORAL at 20:48

## 2018-01-23 RX ADMIN — PRAVASTATIN SODIUM 40 MG: 20 TABLET ORAL at 20:47

## 2018-01-23 RX ADMIN — GABAPENTIN 300 MG: 300 CAPSULE ORAL at 14:03

## 2018-01-23 RX ADMIN — FLUTICASONE PROPIONATE 1 SPRAY: 50 SPRAY, METERED NASAL at 07:54

## 2018-01-23 RX ADMIN — ENOXAPARIN SODIUM 40 MG: 40 INJECTION SUBCUTANEOUS at 20:48

## 2018-01-23 RX ADMIN — ASPIRIN 81 MG: 81 TABLET, COATED ORAL at 07:54

## 2018-01-23 RX ADMIN — GABAPENTIN 300 MG: 300 CAPSULE ORAL at 07:54

## 2018-01-23 RX ADMIN — GABAPENTIN 300 MG: 300 CAPSULE ORAL at 20:48

## 2018-01-23 RX ADMIN — FLUOXETINE HYDROCHLORIDE 20 MG: 20 CAPSULE ORAL at 07:54

## 2018-01-23 ASSESSMENT — PAIN SCALES - GENERAL: PAINLEVEL_OUTOF10: 0

## 2018-01-23 NOTE — PROGRESS NOTES
Nightly, [facility] Blanche, DO, 5 mg at 01/22/18 2040    pravastatin (PRAVACHOL) tablet 40 mg, 40 mg, Oral, Nightly, [facility] Blanche, DO, 40 mg at 01/22/18 2040    docusate sodium (COLACE) capsule 100 mg, 100 mg, Oral, BID PRN, Kisha Cardenas MD, 100 mg at 01/08/18 2058    bisacodyl (DULCOLAX) suppository 10 mg, 10 mg, Rectal, Daily PRN, Kisha Cardenas MD    enoxaparin (LOVENOX) injection 40 mg, 40 mg, Subcutaneous, Nightly, Kisha Cardenas MD, 40 mg at 01/22/18 2040    Allergies:  Penicillins    Social History:   TOBACCO:   reports that she has been smoking. She has been smoking about 0.50 packs per day. She has never used smokeless tobacco.  ETOH:   reports that she drinks alcohol. Family History:       Problem Relation Age of Onset    Mental Illness Mother     Diabetes Mother     Cancer Mother     Mental Illness Father          PHYSICAL EXAM:  /64   Pulse 98   Temp 97.5 °F (36.4 °C)   Resp 18   Ht 5' 2\" (1.575 m)   Wt 198 lb 0.2 oz (89.8 kg)   SpO2 94%   BMI 36.22 kg/m²     Constitutional: she appears well-developed and well-nourished. Eyes - conjunctiva normal.    Ear, nose, throat - No scars, masses, or lesions over external nose or ears, no atrophy of tongue  Neck-symmetric, no masses noted, no jugular vein distension  Respiration- chest wall appears symmetric, good expansion,   normal effort without use of accessory muscles  Musculoskeletal - no significant wasting of muscles noted, no bony deformities Extremities-no clubbing, cyanosis or edema  Skin - warm, dry, and intact. No rash, erythema, or pallor.   Psychiatric - mood, affect, and behavior appear normal.      Neurology  NEUROLOGICAL EXAM:      Mental status   Awake, alert, fluent oriented x 3 appropriate affect  Attention and concentration appear appropriate  Recent and remote memory appears unremarkable  Speech normal without dysarthria  No clear issues with language       Cranial Nerves   CN III, IV,VI-EOMI, No nystagmus,

## 2018-01-23 NOTE — PLAN OF CARE
Problem: Falls - Risk of  Goal: Absence of falls  Outcome: Ongoing  Patient instructed on safety measures to prevent injury: use of assistive devices, clear pathways, avoid sudden movements and calling for assistance as needed. Problem: Risk for Impaired Skin Integrity  Goal: Tissue integrity - skin and mucous membranes  Structural intactness and normal physiological function of skin and  mucous membranes. Outcome: Ongoing  Patient monitored for skin breakdown every shift and prn. Encouraged to reposition frequently. Problem: Bleeding:  Goal: Will show no signs and symptoms of excessive bleeding  Will show no signs and symptoms of excessive bleeding   Outcome: Ongoing  Continue to monitor for signs and symptoms of bleeding due to patient receiving Lovenox. Problem: Mood - Altered:  Goal: Mood stable  Mood stable   Outcome: Ongoing  No signs of depression noted this shift.

## 2018-01-24 ENCOUNTER — TELEPHONE (OUTPATIENT)
Dept: CARDIOLOGY | Age: 53
End: 2018-01-24

## 2018-01-24 PROCEDURE — 99232 SBSQ HOSP IP/OBS MODERATE 35: CPT | Performed by: PSYCHIATRY & NEUROLOGY

## 2018-01-24 PROCEDURE — 92507 TX SP LANG VOICE COMM INDIV: CPT

## 2018-01-24 PROCEDURE — 97110 THERAPEUTIC EXERCISES: CPT

## 2018-01-24 PROCEDURE — 97530 THERAPEUTIC ACTIVITIES: CPT

## 2018-01-24 PROCEDURE — 92526 ORAL FUNCTION THERAPY: CPT

## 2018-01-24 PROCEDURE — 6370000000 HC RX 637 (ALT 250 FOR IP): Performed by: INTERNAL MEDICINE

## 2018-01-24 PROCEDURE — 6360000002 HC RX W HCPCS: Performed by: PSYCHIATRY & NEUROLOGY

## 2018-01-24 PROCEDURE — 97535 SELF CARE MNGMENT TRAINING: CPT

## 2018-01-24 PROCEDURE — 1180000000 HC REHAB R&B

## 2018-01-24 PROCEDURE — 97116 GAIT TRAINING THERAPY: CPT

## 2018-01-24 RX ADMIN — ENOXAPARIN SODIUM 40 MG: 40 INJECTION SUBCUTANEOUS at 20:46

## 2018-01-24 RX ADMIN — PRAVASTATIN SODIUM 40 MG: 20 TABLET ORAL at 20:46

## 2018-01-24 RX ADMIN — GABAPENTIN 300 MG: 300 CAPSULE ORAL at 14:14

## 2018-01-24 RX ADMIN — GABAPENTIN 300 MG: 300 CAPSULE ORAL at 20:46

## 2018-01-24 RX ADMIN — ASPIRIN 81 MG: 81 TABLET, COATED ORAL at 07:44

## 2018-01-24 RX ADMIN — FLUTICASONE PROPIONATE 1 SPRAY: 50 SPRAY, METERED NASAL at 07:44

## 2018-01-24 RX ADMIN — GABAPENTIN 300 MG: 300 CAPSULE ORAL at 07:44

## 2018-01-24 RX ADMIN — OLANZAPINE 5 MG: 5 TABLET, FILM COATED ORAL at 20:46

## 2018-01-24 RX ADMIN — FLUOXETINE HYDROCHLORIDE 20 MG: 20 CAPSULE ORAL at 07:44

## 2018-01-24 ASSESSMENT — PAIN SCALES - GENERAL: PAINLEVEL_OUTOF10: 0

## 2018-01-24 NOTE — PROGRESS NOTES
nystagmus, conjugate eye movements, no ptosis    CN VII-left facial droop           Motor function  Antigravity x 4 drift left     Sensory function      Cerebellar      Tremor None present     Gait                  Not tested           Nursing/pcp notes, imaging,labs and vitals reviewed. PT,OT and/or speech notes reviewed    Lab Results   Component Value Date    WBC 6.7 01/22/2018    HGB 12.5 01/22/2018    HCT 37.6 01/22/2018    MCV 99.2 (H) 01/22/2018     01/22/2018     Lab Results   Component Value Date     01/06/2018    K 3.8 01/06/2018     01/06/2018    CO2 28 01/06/2018    BUN 12 01/06/2018    CREATININE 0.7 01/06/2018    GLUCOSE 97 01/06/2018    CALCIUM 9.1 01/06/2018    PROT 6.7 01/06/2018    LABALBU 3.7 01/06/2018    BILITOT <0.2 01/06/2018    ALKPHOS 63 01/06/2018    AST 16 01/06/2018    ALT 19 01/06/2018    LABGLOM >60 01/06/2018    GLOB 3.0 11/12/2016     Lab Results   Component Value Date    INR 1.03 01/01/2018    PROTIME 13.4 01/01/2018             RECORD REVIEW: Previous medical records, medications were reviewed at today's visit    IMPRESSION:   1. Stroke-on aspirin/statin-cardioembolic status post loop recorder/WAQAR  2. Abnormal brain scan- repeat MRI of the brain in 4-6 weeks  3. Mood disorder-on medications continue monitor  4. DVT prophylaxis-on Lovenox  5. Allergies Flonase  6. Hyperlipidemia-on statin  7.  PT/OT/speech    Continue care  LIBBY cooper 1 week        CALL WITH ANY QUESTIONS  [phone] CELL  Dr Michael Haddad

## 2018-01-24 NOTE — PROGRESS NOTES
75-90%+   Memory: 4 - Patient remembers 75-90%+ of the time    ASSESSMENT:  Assessment: [x]Progressing towards goals          []Not Progressing towards goals    Patient Tolerance of Treatment:   [x]Tolerated well []Tolerated fair []Required rest breaks []Fatigued    Education:  Learner:  [x]Patient          [x]Significant Other          []Other  Education provided regarding:  [x]Goals and POC   [x]Diet and swallowing precautions    []Home Exercise Program  [x]Progress and/or discharge information  Method of Education:  [x]Discussion          []Demonstration          []Handout          []Other  Evaluation of Education:   [x]Verbalized understanding   []Demonstrates without assistance  []Demonstrates with assistance  []Needs further instruction     []No evidence of learning                  []No family present      Plan: [x]Continue with current plan of care    []Modify current plan of care as follows:    []Discharge patient    Discharge Location:    Services/Supervision Recommended:      [x]Patient continues to require treatment by a licensed therapist to address functional deficits as outlined in the established plan of care.               Electronically Signed By:  Veronique Gann  3/41/1093,6:95 PM.

## 2018-01-24 NOTE — TELEPHONE ENCOUNTER
Message from [employer] stating there is no communication with her home monitor (loop recorder). Patient is on the rehab floor. I spoke with Da Dacosta, she will check on the monitor and call back with any questions or updated information.

## 2018-01-25 LAB
BASOPHILS ABSOLUTE: 0 K/UL (ref 0–0.2)
BASOPHILS RELATIVE PERCENT: 0.5 % (ref 0–1)
EOSINOPHILS ABSOLUTE: 0.5 K/UL (ref 0–0.6)
EOSINOPHILS RELATIVE PERCENT: 7.9 % (ref 0–5)
HCT VFR BLD CALC: 36.9 % (ref 37–47)
HEMOGLOBIN: 12.1 G/DL (ref 12–16)
LYMPHOCYTES ABSOLUTE: 2.5 K/UL (ref 1.1–4.5)
LYMPHOCYTES RELATIVE PERCENT: 39.8 % (ref 20–40)
MCH RBC QN AUTO: 32.5 PG (ref 27–31)
MCHC RBC AUTO-ENTMCNC: 32.8 G/DL (ref 33–37)
MCV RBC AUTO: 99.2 FL (ref 81–99)
MONOCYTES ABSOLUTE: 0.6 K/UL (ref 0–0.9)
MONOCYTES RELATIVE PERCENT: 10.3 % (ref 0–10)
NEUTROPHILS ABSOLUTE: 2.6 K/UL (ref 1.5–7.5)
NEUTROPHILS RELATIVE PERCENT: 41.2 % (ref 50–65)
PDW BLD-RTO: 14.1 % (ref 11.5–14.5)
PLATELET # BLD: 236 K/UL (ref 130–400)
PMV BLD AUTO: 10 FL (ref 9.4–12.3)
RBC # BLD: 3.72 M/UL (ref 4.2–5.4)
WBC # BLD: 6.2 K/UL (ref 4.8–10.8)

## 2018-01-25 PROCEDURE — 36415 COLL VENOUS BLD VENIPUNCTURE: CPT

## 2018-01-25 PROCEDURE — 97535 SELF CARE MNGMENT TRAINING: CPT

## 2018-01-25 PROCEDURE — 97116 GAIT TRAINING THERAPY: CPT

## 2018-01-25 PROCEDURE — 97110 THERAPEUTIC EXERCISES: CPT

## 2018-01-25 PROCEDURE — 6370000000 HC RX 637 (ALT 250 FOR IP): Performed by: INTERNAL MEDICINE

## 2018-01-25 PROCEDURE — 92526 ORAL FUNCTION THERAPY: CPT

## 2018-01-25 PROCEDURE — 6360000002 HC RX W HCPCS: Performed by: PSYCHIATRY & NEUROLOGY

## 2018-01-25 PROCEDURE — 92507 TX SP LANG VOICE COMM INDIV: CPT

## 2018-01-25 PROCEDURE — 85025 COMPLETE CBC W/AUTO DIFF WBC: CPT

## 2018-01-25 PROCEDURE — 1180000000 HC REHAB R&B

## 2018-01-25 PROCEDURE — 99232 SBSQ HOSP IP/OBS MODERATE 35: CPT | Performed by: PSYCHIATRY & NEUROLOGY

## 2018-01-25 RX ADMIN — OLANZAPINE 5 MG: 5 TABLET, FILM COATED ORAL at 21:10

## 2018-01-25 RX ADMIN — PRAVASTATIN SODIUM 40 MG: 20 TABLET ORAL at 21:10

## 2018-01-25 RX ADMIN — GABAPENTIN 300 MG: 300 CAPSULE ORAL at 13:44

## 2018-01-25 RX ADMIN — GABAPENTIN 300 MG: 300 CAPSULE ORAL at 21:10

## 2018-01-25 RX ADMIN — ASPIRIN 81 MG: 81 TABLET, COATED ORAL at 07:50

## 2018-01-25 RX ADMIN — FLUTICASONE PROPIONATE 1 SPRAY: 50 SPRAY, METERED NASAL at 07:49

## 2018-01-25 RX ADMIN — ENOXAPARIN SODIUM 40 MG: 40 INJECTION SUBCUTANEOUS at 21:10

## 2018-01-25 RX ADMIN — GABAPENTIN 300 MG: 300 CAPSULE ORAL at 07:50

## 2018-01-25 RX ADMIN — FLUOXETINE HYDROCHLORIDE 20 MG: 20 CAPSULE ORAL at 07:49

## 2018-01-26 PROCEDURE — 97110 THERAPEUTIC EXERCISES: CPT

## 2018-01-26 PROCEDURE — 97530 THERAPEUTIC ACTIVITIES: CPT

## 2018-01-26 PROCEDURE — 97116 GAIT TRAINING THERAPY: CPT

## 2018-01-26 PROCEDURE — 92526 ORAL FUNCTION THERAPY: CPT

## 2018-01-26 PROCEDURE — 99232 SBSQ HOSP IP/OBS MODERATE 35: CPT | Performed by: PSYCHIATRY & NEUROLOGY

## 2018-01-26 PROCEDURE — 6370000000 HC RX 637 (ALT 250 FOR IP): Performed by: INTERNAL MEDICINE

## 2018-01-26 PROCEDURE — 1180000000 HC REHAB R&B

## 2018-01-26 PROCEDURE — 92507 TX SP LANG VOICE COMM INDIV: CPT

## 2018-01-26 PROCEDURE — 6360000002 HC RX W HCPCS: Performed by: PSYCHIATRY & NEUROLOGY

## 2018-01-26 PROCEDURE — 97535 SELF CARE MNGMENT TRAINING: CPT

## 2018-01-26 RX ORDER — NAPROXEN 500 MG/1
500 TABLET ORAL 2 TIMES DAILY PRN
Status: DISCONTINUED | OUTPATIENT
Start: 2018-01-26 | End: 2018-01-29 | Stop reason: HOSPADM

## 2018-01-26 RX ADMIN — ENOXAPARIN SODIUM 40 MG: 40 INJECTION SUBCUTANEOUS at 21:18

## 2018-01-26 RX ADMIN — PRAVASTATIN SODIUM 40 MG: 20 TABLET ORAL at 21:18

## 2018-01-26 RX ADMIN — GABAPENTIN 300 MG: 300 CAPSULE ORAL at 21:18

## 2018-01-26 RX ADMIN — FLUTICASONE PROPIONATE 1 SPRAY: 50 SPRAY, METERED NASAL at 08:00

## 2018-01-26 RX ADMIN — OLANZAPINE 5 MG: 5 TABLET, FILM COATED ORAL at 21:18

## 2018-01-26 RX ADMIN — GABAPENTIN 300 MG: 300 CAPSULE ORAL at 13:56

## 2018-01-26 RX ADMIN — GABAPENTIN 300 MG: 300 CAPSULE ORAL at 08:00

## 2018-01-26 RX ADMIN — ASPIRIN 81 MG: 81 TABLET, COATED ORAL at 08:00

## 2018-01-26 RX ADMIN — ACETAMINOPHEN 650 MG: 325 TABLET, FILM COATED ORAL at 18:16

## 2018-01-26 RX ADMIN — FLUOXETINE HYDROCHLORIDE 20 MG: 20 CAPSULE ORAL at 08:00

## 2018-01-26 ASSESSMENT — PAIN SCALES - GENERAL
PAINLEVEL_OUTOF10: 0
PAINLEVEL_OUTOF10: 5

## 2018-01-26 NOTE — PROGRESS NOTES
Patient:   Sandie Saldivar  MR#:    [id number]   Room:    0812/812-01   YOB: 1965  Date of Progress Note: 1/26/2018  Time of Note                           9:05 AM  Consulting Physician:   Sherly Hughes M.D. Attending Physician:  Sherly Hughes MD     Chief complaint stroke/abnormal brain scan    S:This 50 y.o. female  who presented to [place] ER on 1/1/8 with c/o left sided weakness & facial drooping,right-sided numbness,dizziness, dysarthria that had started about 1 week prior. CT of head done in ER revealed right frontal lobe hypodensities are most consistent with  nonhemorrhagic subacute ischemia of the right middle cerebral artery distribution. She was admitted for further treatment with consult for neurology. MRI of brain done revealed seeral areas of small acute inarction in the right hemisphere,as well as a 17x42 mm enhancing lesion on the right basal ganglia. She was seen by Neurologist Dr. Ness Navarro who felt the infarcts were consistent with embolic process. Cardiology was consulted for evaluation WAQAR and loop recorder. She was seen by Dr. Prince Kurtz and taken for WAQAR and loop recorder placement on 1/4/18. She tolerated the procedure well. Neurosurgery was consulted to evaluate lesion. She was seen by Dr. Cheri Vazquez. Dr. Shaw Session believed that the findings of the MRI are consistent with an ischemic event followed by a small amount of hemorrhage that is resulting in cerebral edema, however, the possibility of an underlying mass is not entirely excluded. He will follow her very closely to watch the dynamics of the mass. He felt that a biopsy of a deep cerebral lesion that could possibly be a stroke poses more risk than benefit at this time and recommended repeat MRI brain in 4-6 weeks with an outpatient follow up. She continues to have left sided weakness,dysarthria and dysphasia. She was evaluated by SPT therapy and is currently on mechanical soft diet with nectar thick liquids.  She is Nightly, Sheralexis Chavezo, DO, 5 mg at 01/25/18 2110    pravastatin (PRAVACHOL) tablet 40 mg, 40 mg, Oral, Nightly, Teresa Garsia DO, 40 mg at 01/25/18 2110    docusate sodium (COLACE) capsule 100 mg, 100 mg, Oral, BID PRN, Jimena Cruz MD, 100 mg at 01/08/18 2058    bisacodyl (DULCOLAX) suppository 10 mg, 10 mg, Rectal, Daily PRN, Jimena Cruz MD    enoxaparin (LOVENOX) injection 40 mg, 40 mg, Subcutaneous, Nightly, Jimena Cruz MD, 40 mg at 01/25/18 2110    Allergies:  Penicillins    Social History:   TOBACCO:   reports that she has been smoking. She has been smoking about 0.50 packs per day. She has never used smokeless tobacco.  ETOH:   reports that she drinks alcohol. Family History:       Problem Relation Age of Onset    Mental Illness Mother     Diabetes Mother     Cancer Mother     Mental Illness Father          PHYSICAL EXAM:  /60   Pulse 80   Temp 98.6 °F (37 °C) (Temporal)   Resp 16   Ht 5' 2\" (1.575 m)   Wt 198 lb 0.2 oz (89.8 kg)   SpO2 93%   BMI 36.22 kg/m²     Constitutional: she appears well-developed and well-nourished. Eyes - conjunctiva normal.    Ear, nose, throat - No scars, masses, or lesions over external nose or ears, no atrophy of tongue  Neck-symmetric, no masses noted, no jugular vein distension  Respiration- chest wall appears symmetric, good expansion,   normal effort without use of accessory muscles  Musculoskeletal - no significant wasting of muscles noted, no bony deformities Extremities-no clubbing, cyanosis or edema  Skin - warm, dry, and intact. No rash, erythema, or pallor.   Psychiatric - mood, affect, and behavior appear normal.      Neurology  NEUROLOGICAL EXAM:      Mental status   Awake, alert, fluent oriented x 3 appropriate affect  Attention and concentration appear appropriate  Recent and remote memory appears unremarkable  Speech normal without dysarthria  No clear issues with language       Cranial Nerves   CN III, IV,VI-EOMI, No

## 2018-01-26 NOTE — PROGRESS NOTES
present      Plan: [x]Continue with current plan of care    []Modify current plan of care as follows:    []Discharge patient    Discharge Location:    Services/Supervision Recommended:      [x]Patient continues to require treatment by a licensed therapist to address functional deficits as outlined in the established plan of care.     Electronically signed by CATHY Qureshi on 1/26/2018 at 11:46 AM

## 2018-01-27 PROCEDURE — 6370000000 HC RX 637 (ALT 250 FOR IP): Performed by: INTERNAL MEDICINE

## 2018-01-27 PROCEDURE — 6360000002 HC RX W HCPCS: Performed by: PSYCHIATRY & NEUROLOGY

## 2018-01-27 PROCEDURE — 1180000000 HC REHAB R&B

## 2018-01-27 PROCEDURE — 99232 SBSQ HOSP IP/OBS MODERATE 35: CPT | Performed by: PSYCHIATRY & NEUROLOGY

## 2018-01-27 RX ADMIN — PRAVASTATIN SODIUM 40 MG: 20 TABLET ORAL at 19:58

## 2018-01-27 RX ADMIN — GABAPENTIN 300 MG: 300 CAPSULE ORAL at 13:57

## 2018-01-27 RX ADMIN — ENOXAPARIN SODIUM 40 MG: 40 INJECTION SUBCUTANEOUS at 19:58

## 2018-01-27 RX ADMIN — OLANZAPINE 5 MG: 5 TABLET, FILM COATED ORAL at 19:58

## 2018-01-27 RX ADMIN — GABAPENTIN 300 MG: 300 CAPSULE ORAL at 09:01

## 2018-01-27 RX ADMIN — FLUOXETINE HYDROCHLORIDE 20 MG: 20 CAPSULE ORAL at 09:01

## 2018-01-27 RX ADMIN — FLUTICASONE PROPIONATE 1 SPRAY: 50 SPRAY, METERED NASAL at 09:01

## 2018-01-27 RX ADMIN — ASPIRIN 81 MG: 81 TABLET, COATED ORAL at 09:01

## 2018-01-27 RX ADMIN — GABAPENTIN 300 MG: 300 CAPSULE ORAL at 19:58

## 2018-01-27 NOTE — PROGRESS NOTES
appears unremarkable  Speech normal without dysarthria  No clear issues with language       Cranial Nerves   CN III, IV,VI-EOMI, No nystagmus, conjugate eye movements, no ptosis    CN VII-left facial droop           Motor function  Antigravity x 4 drift left     Sensory function      Cerebellar      Tremor None present     Gait                  Not tested           Nursing/pcp notes, imaging,labs and vitals reviewed. PT,OT and/or speech notes reviewed    Lab Results   Component Value Date    WBC 6.2 01/25/2018    HGB 12.1 01/25/2018    HCT 36.9 (L) 01/25/2018    MCV 99.2 (H) 01/25/2018     01/25/2018     Lab Results   Component Value Date     01/06/2018    K 3.8 01/06/2018     01/06/2018    CO2 28 01/06/2018    BUN 12 01/06/2018    CREATININE 0.7 01/06/2018    GLUCOSE 97 01/06/2018    CALCIUM 9.1 01/06/2018    PROT 6.7 01/06/2018    LABALBU 3.7 01/06/2018    BILITOT <0.2 01/06/2018    ALKPHOS 63 01/06/2018    AST 16 01/06/2018    ALT 19 01/06/2018    LABGLOM >60 01/06/2018    GLOB 3.0 11/12/2016     Lab Results   Component Value Date    INR 1.03 01/01/2018    PROTIME 13.4 01/01/2018             RECORD REVIEW: Previous medical records, medications were reviewed at today's visit    IMPRESSION:   1. Stroke-on aspirin/statin-cardioembolic status post loop recorder/WAQAR  2. Abnormal brain scan- repeat MRI of the brain in 4-6 weeks  3. Mood disorder-on medications continue monitor  4. DVT prophylaxis-on Lovenox  5. Allergies Flonase  6. Hyperlipidemia-on statin  7.  PT/OT/speech    Continue present care  [facility]  Dr Purvis Every

## 2018-01-27 NOTE — PLAN OF CARE
Problem: Falls - Risk of  Goal: Absence of falls  Outcome: Ongoing  Patient has Personal and Bed Alarm in place for safety. Problem: Risk for Impaired Skin Integrity  Goal: Tissue integrity - skin and mucous membranes  Structural intactness and normal physiological function of skin and  mucous membranes. Outcome: Ongoing  Skin remains intact with no skin breakdown noted. Problem: Bleeding:  Goal: Will show no signs and symptoms of excessive bleeding  Will show no signs and symptoms of excessive bleeding   Outcome: Ongoing  No bleeding this shift. Will continue to monitor for any bleeding. Problem: Mood - Altered:  Goal: Mood stable  Mood stable   Outcome: Ongoing  Patient remains pleasant and cooperative.

## 2018-01-28 PROCEDURE — 6360000002 HC RX W HCPCS: Performed by: PSYCHIATRY & NEUROLOGY

## 2018-01-28 PROCEDURE — 1180000000 HC REHAB R&B

## 2018-01-28 PROCEDURE — 6370000000 HC RX 637 (ALT 250 FOR IP): Performed by: INTERNAL MEDICINE

## 2018-01-28 RX ADMIN — ASPIRIN 81 MG: 81 TABLET, COATED ORAL at 08:51

## 2018-01-28 RX ADMIN — FLUTICASONE PROPIONATE 1 SPRAY: 50 SPRAY, METERED NASAL at 08:51

## 2018-01-28 RX ADMIN — ENOXAPARIN SODIUM 40 MG: 40 INJECTION SUBCUTANEOUS at 21:21

## 2018-01-28 RX ADMIN — GABAPENTIN 300 MG: 300 CAPSULE ORAL at 21:21

## 2018-01-28 RX ADMIN — OLANZAPINE 5 MG: 5 TABLET, FILM COATED ORAL at 21:21

## 2018-01-28 RX ADMIN — PRAVASTATIN SODIUM 40 MG: 20 TABLET ORAL at 21:21

## 2018-01-28 RX ADMIN — FLUOXETINE HYDROCHLORIDE 20 MG: 20 CAPSULE ORAL at 08:50

## 2018-01-28 RX ADMIN — GABAPENTIN 300 MG: 300 CAPSULE ORAL at 08:50

## 2018-01-28 RX ADMIN — GABAPENTIN 300 MG: 300 CAPSULE ORAL at 13:36

## 2018-01-28 ASSESSMENT — PAIN DESCRIPTION - PROGRESSION: CLINICAL_PROGRESSION: NOT CHANGED

## 2018-01-28 ASSESSMENT — PAIN SCALES - GENERAL: PAINLEVEL_OUTOF10: 0

## 2018-01-28 NOTE — PLAN OF CARE
Problem: Falls - Risk of  Goal: Absence of falls  Outcome: Ongoing  Will remain free of injury from fall. Continue to use call light for any assistance needed. Bed alarm on. Problem: Risk for Impaired Skin Integrity  Goal: Tissue integrity - skin and mucous membranes  Structural intactness and normal physiological function of skin and  mucous membranes. Outcome: Ongoing  Skin issues will continue to heal w/o s/s infection. Will not have new skin breakdown. Problem: Bleeding:  Goal: Will show no signs and symptoms of excessive bleeding  Will show no signs and symptoms of excessive bleeding   Outcome: Ongoing  No active bleeding noted this shift. Problem: Mood - Altered:  Goal: Mood stable  Mood stable   Outcome: Ongoing  Will maintain pleasant mood.

## 2018-01-29 ENCOUNTER — TELEPHONE (OUTPATIENT)
Dept: CARDIOLOGY | Age: 53
End: 2018-01-29

## 2018-01-29 VITALS
DIASTOLIC BLOOD PRESSURE: 76 MMHG | HEIGHT: 62 IN | SYSTOLIC BLOOD PRESSURE: 114 MMHG | WEIGHT: 198.01 LBS | RESPIRATION RATE: 16 BRPM | BODY MASS INDEX: 36.44 KG/M2 | TEMPERATURE: 98.1 F | OXYGEN SATURATION: 97 % | HEART RATE: 86 BPM

## 2018-01-29 LAB
BASOPHILS ABSOLUTE: 0 K/UL (ref 0–0.2)
BASOPHILS RELATIVE PERCENT: 0.6 % (ref 0–1)
EOSINOPHILS ABSOLUTE: 0.4 K/UL (ref 0–0.6)
EOSINOPHILS RELATIVE PERCENT: 5.3 % (ref 0–5)
HCT VFR BLD CALC: 38.4 % (ref 37–47)
HEMOGLOBIN: 12.6 G/DL (ref 12–16)
LYMPHOCYTES ABSOLUTE: 2.5 K/UL (ref 1.1–4.5)
LYMPHOCYTES RELATIVE PERCENT: 36.9 % (ref 20–40)
MCH RBC QN AUTO: 32.7 PG (ref 27–31)
MCHC RBC AUTO-ENTMCNC: 32.8 G/DL (ref 33–37)
MCV RBC AUTO: 99.7 FL (ref 81–99)
MONOCYTES ABSOLUTE: 0.7 K/UL (ref 0–0.9)
MONOCYTES RELATIVE PERCENT: 10.7 % (ref 0–10)
NEUTROPHILS ABSOLUTE: 3.1 K/UL (ref 1.5–7.5)
NEUTROPHILS RELATIVE PERCENT: 46.2 % (ref 50–65)
PDW BLD-RTO: 14.1 % (ref 11.5–14.5)
PLATELET # BLD: 234 K/UL (ref 130–400)
PMV BLD AUTO: 10.4 FL (ref 9.4–12.3)
RBC # BLD: 3.85 M/UL (ref 4.2–5.4)
WBC # BLD: 6.6 K/UL (ref 4.8–10.8)

## 2018-01-29 PROCEDURE — 99239 HOSP IP/OBS DSCHRG MGMT >30: CPT | Performed by: PSYCHIATRY & NEUROLOGY

## 2018-01-29 PROCEDURE — 36415 COLL VENOUS BLD VENIPUNCTURE: CPT

## 2018-01-29 PROCEDURE — 6370000000 HC RX 637 (ALT 250 FOR IP): Performed by: INTERNAL MEDICINE

## 2018-01-29 PROCEDURE — 85025 COMPLETE CBC W/AUTO DIFF WBC: CPT

## 2018-01-29 RX ORDER — OLANZAPINE 5 MG/1
5 TABLET ORAL NIGHTLY
Qty: 30 TABLET | Refills: 0 | Status: ON HOLD | OUTPATIENT
Start: 2018-01-29 | End: 2019-07-05 | Stop reason: HOSPADM

## 2018-01-29 RX ORDER — ASPIRIN 81 MG/1
81 TABLET ORAL DAILY
Qty: 30 TABLET | Refills: 0 | Status: SHIPPED | OUTPATIENT
Start: 2018-01-29 | End: 2019-06-28

## 2018-01-29 RX ORDER — GABAPENTIN 300 MG/1
300 CAPSULE ORAL 3 TIMES DAILY
Qty: 90 CAPSULE | Refills: 0 | Status: SHIPPED | OUTPATIENT
Start: 2018-01-29 | End: 2018-07-03 | Stop reason: ALTCHOICE

## 2018-01-29 RX ORDER — PRAVASTATIN SODIUM 40 MG
40 TABLET ORAL NIGHTLY
Qty: 30 TABLET | Refills: 0 | Status: ON HOLD | OUTPATIENT
Start: 2018-01-29 | End: 2021-02-23 | Stop reason: SDUPTHER

## 2018-01-29 RX ORDER — ACETAMINOPHEN 325 MG/1
650 TABLET ORAL EVERY 6 HOURS PRN
Qty: 60 TABLET | Refills: 0 | Status: SHIPPED | OUTPATIENT
Start: 2018-01-29 | End: 2018-05-30

## 2018-01-29 RX ADMIN — FLUOXETINE HYDROCHLORIDE 20 MG: 20 CAPSULE ORAL at 08:26

## 2018-01-29 RX ADMIN — ASPIRIN 81 MG: 81 TABLET, COATED ORAL at 08:26

## 2018-01-29 RX ADMIN — GABAPENTIN 300 MG: 300 CAPSULE ORAL at 08:26

## 2018-01-29 RX ADMIN — FLUTICASONE PROPIONATE 1 SPRAY: 50 SPRAY, METERED NASAL at 08:26

## 2018-01-29 NOTE — PLAN OF CARE
Problem: Falls - Risk of  Goal: Absence of falls  Outcome: Completed Date Met: 01/29/18  No falls since admission. Problem: Risk for Impaired Skin Integrity  Goal: Tissue integrity - skin and mucous membranes  Structural intactness and normal physiological function of skin and  mucous membranes. Outcome: Completed Date Met: 01/29/18  No new skin breakdown since admission. Problem: Bleeding:  Goal: Will show no signs and symptoms of excessive bleeding  Will show no signs and symptoms of excessive bleeding   Outcome: Completed Date Met: 01/29/18  No signs and symptoms of bleeding. Problem: Mood - Altered:  Goal: Mood stable  Mood stable   Outcome: Completed Date Met: 01/29/18  Mood stable.

## 2018-01-29 NOTE — CARE COORDINATION
Ms. Kelley Chang is being discharged home with , who has stayed with her and participated in her care, education, and safety maintenance. They are returning to [facility] where they are in a single family bungalow. Both  And Mrs. Kelley Chang are served by [employer], with  that were previously and continuously working with them on seeking house and assistance. An application had been made for [address]  in [place] name- she was approved for voucher but voucher  during her hospitalization. Request was made to extend but due to rules a new application will have to be made. Her 's name was not on the application, therefore he could not do the paperwork himself. He has been intermittantly looking for appropriate housing while she has been here. All appropriate papers/records gathered for application for Social Security Disability. They are assisted with transportation with [employer]. Referral is made to [facility] for continued rehabilitation care.

## 2018-01-29 NOTE — TELEPHONE ENCOUNTER
Hospital call needed a 1 month  f/u with Dr Tremayne Woodall ,PS didnt have any appt with Dr Tremayne Woodall for a month,could you please call the patient with appt.

## 2018-01-31 ENCOUNTER — TELEPHONE (OUTPATIENT)
Dept: NEUROLOGY | Age: 53
End: 2018-01-31

## 2018-01-31 LAB
EKG P AXIS: 31 DEGREES
EKG P-R INTERVAL: 160 MS
EKG Q-T INTERVAL: 396 MS
EKG QRS DURATION: 92 MS
EKG QTC CALCULATION (BAZETT): 438 MS
EKG T AXIS: 70 DEGREES

## 2018-01-31 NOTE — PROGRESS NOTES
[facility] Inpatient Rehab   Speech Therapy   Discharge Summary      Date: 2018  Patient Name: Jennifer Canchola        MRN: [id number]    Account #: [de-identified]  : 1965  (50 y.o.)  Gender: female   Admit Date:  2018  Discharge date : 2018   Attending Provider: Omer Huffman MD     Account Number: [de-identified]                                    Diet at time of Discharge:  Dysphagia 2 diet  Thin liquids  (therapist would prefer cup sips, however, pt continues to use straws)  Meds whole in pudding or applesauce     Precautions:  Small bites and sips  Upright for all oral intake  Remain upright for 30 minutes following all meals  Utilize lingual search/sweep during and following all meals  Alternate liquids and foods         Admission Diagnoses:   Acute ischemic stroke ([address] [I63.9]  Acute ischemic stroke ([address] [I63.9]     Discharge Diagnoses:  Principal Problem:    Cerebrovascular accident (CVA) ([address]  Active Problems:    Essential hypertension    Hemiplegia affecting non-dominant side, post-stroke ([address]    Gait abnormality    Ataxia    Numbness    Dysarthria    Lesion of basal ganglia    Cryptogenic stroke ([address]    Status post placement of implantable loop recorder            Hospital Course: This 50 y.o. female who presented to [facility] ER on  with c/o left sided weakness & facial drooping,right-sided numbness,dizziness, dysarthria that had started about 1 week prior. CT of head done in ER revealed right frontal lobe hypodensities are most consistent with  nonhemorrhagic subacute ischemia of the right middle cerebral artery distribution. She was admitted for further treatment with consult for neurology. MRI of brain done revealed seeral areas of small acute inarction in the right hemisphere,as well as a 17x42 mm enhancing lesion on the right basal ganglia. She was seen by Neurologist Dr. Brissa Molina who felt the infarcts were consistent with embolic process.  Cardiology was consulted for evaluation STRATEGIES TO INCREASE INTELLIGIBILITY IN DIFFERENT SITUATIONS.             Comprehension: 5 - Patient understands basic needs (hungry/hot/pain)  Expression: 4 - Expresses basic ideas/needs 75-90%+ of the time  Social Interaction: 5 - Patient is appropriate with supervision/cues  Problem Solvin - Patient solves simple/routine tasks 75-90%+   Memory: 4 - Patient remembers 75-90%+ of the time            SHORT TERM GOAL #1:  Goal 1: Goal 1: Patient will tolerate mechanical soft consistency and nectar thick liquids with min S/S penetration/aspiration during PO intake. met New Goal: The patient will tolerate a dysphagia 2 diet with thin liquids with min s/s of penetration/aspiration during po intake. .met      SHORT TERM GOAL #2:  Goal 2: Patient staff will follow swallow safety recommendations to decrease risk of penetration/aspiration during PO intake. not met      SHORT TERM GOAL #3:  Goal 3: Patient will complete oral motor, lingual, and pharyngeal strengthening exercises with provision of min cues/prompts. not met      SHORT TERM GOAL #4:  Goal 4: Re-assessment of swallowing function for potential diet upgrade. not met      SHORT TERM GOAL #5:  Goal 5: Patient will utilize tools/strategies to promote increased clarity of speech at word, phrase, and sentence level at 70% of opportunities. not met          Short-term Goals  Timeframe for Short-term Goals: 1-2x/day for 1 wk  Goal 1: Patient will tolerate mechanical soft consistency and nectar thick liquids with min S/S penetration/aspiration during PO intake. Goal 2: Patient staff will follow swallow safety recommendations to decrease risk of penetration/aspiration during PO intake. Goal 3: Patient will complete oral motor, lingual, and pharyngeal strengthening exercises with provision of min cues/prompts. Goal 4: Re-assessment of swallowing function for potential diet upgrade.   Goal 5: Patient will utilize tools/strategies to promote increased clarity of speech

## 2018-02-01 NOTE — DISCHARGE SUMMARY
Occupational Therapy Discharge Summary         Date: 2018  Patient Name: Maggie Glover        MRN: [id number]    : 1965  (50 y.o.)  Gender: female      Diagnosis: STROKE WITH LEFT BODY INVOLVEMENT  Restrictions/Precautions  Restrictions/Precautions: Swallowing - Thickened Liquids, Modified Diet, Fall Risk (MS diet with NTL)  Implants present? :  (heart monitor)      Discharge Date: 2018    ADL Discharge FIM Scores:  Eatin - Feeds self with setup/supervision/cues and/or requires only setup/supervision/cues to perform tube feedings  Groomin - Requires setup/cues to do all tasks (standing at sink )  Bathin - Able to bathe 8-9 areas (assistance with L foot)  Dressing-Upper: 5 - Requires setup/supervision/cues and/or requires assist with presthesis/brace only  Dressing-Lower: 4 - Requires assist with buttons/zippers/shoelaces and/or assist with shoes only  Toiletin - Requires setup/supervision/cues  Toilet Transfer: 5 - Requires setup/supervision/cues  Tub Transfer: 4 - Minimal contact assistance, pt. expends 75% or more effort (RW>TTB)  Shower Transfer: 5 - Supervision, set-up, cues (RW<>TTB)      Comprehension: 5 - Patient understands basic needs (hungry/hot/pain)  Expression: 7 - Patient expresses complex ideas/needs  Social Interaction: 6 - Patient requires medication for mood and/or effect  Problem Solvin - Patient able to solve simple/routine tasks  Memory: 4 - Patient remembers 75-90%+ of the time    UE Functioning:  BUE ROM WFL  LUE-ataxia     Home Management:  Functional Mobility  Functional - Mobility Device: Rolling Walker  Activity: To/from bathroom, Other  Assist Level: Supervision  Functional Mobility Comments: vc on positioning of RW and L LE; vc to slow down     Adaptive Equipment/DME Status:     Home Equipment: Rolling walker    Remaining Problems:  Decreased functional mobility ; Decreased coordination; Decreased ADL status;  Decreased balance; Decreased safe
Plan DC home with home health.           Discharge Instructions     Patient Instructions:   Home with [address] orders: PT, OT, SLP and nursing   Discharge lab work: none  Code status: Full Code   Activity: activity as tolerated  Diet: DIET DYSPHAGIA II MECHANICALLY ALTERED;    Wound Care: as directed  Equipment: as per therapy      Amada Rabago MD    At least 35 minutes were spent in discharging the patient
Score: 88  1 STEP  1 Step (Curb)  Assistance Needed: Physical assistance  Physical Assistance Level: 25%-49%  Reason if not Attempted: Safety concerns  CARE Score: 3  4 STEPS  4 Steps  Reason if not Attempted: Safety concerns  CARE Score: 88  12 STEPS  12 Steps  Reason if not Attempted: Safety concerns  CARE Score: 88  PICKING UP OBJECT  Picking Up Object  Reason if not Attempted: Safety concerns  CARE Score: 88  WHEELCHAIR 50 FT  Wheel 50 Feet with Two Turns  Assistance Needed: Physical assistance  Physical Assistance Level: 25%-49%  CARE Score: 3  WHEELCHAIR 150 FT  Wheel 150 Feet  Assistance Needed: Physical assistance  Physical Assistance Level: 25%-49%  Comment: UNABLE TO STEER OR PERFORM TURNS  Reason if not Attempted: Safety concerns  CARE Score: 80           Electronically signed by Eliezer Salazar PT on 2/1/2018 at 8:22 AM

## 2018-02-13 ENCOUNTER — HOSPITAL ENCOUNTER (OUTPATIENT)
Dept: MRI IMAGING | Age: 53
Discharge: HOME | End: 2018-02-13
Payer: MEDICAID

## 2018-02-13 DIAGNOSIS — I63.9 CEREBROVASCULAR ACCIDENT (CVA) DETERMINED BY CLINICAL ASSESSMENT (HCC): ICD-10-CM

## 2018-02-13 DIAGNOSIS — R90.89 ABNORMAL FINDING ON MRI OF BRAIN: ICD-10-CM

## 2018-02-13 PROCEDURE — 70553 MRI BRAIN STEM W/O & W/DYE: CPT

## 2018-02-13 PROCEDURE — A9577 INJ MULTIHANCE: HCPCS | Performed by: NURSE PRACTITIONER

## 2018-02-13 PROCEDURE — 6360000004 HC RX CONTRAST MEDICATION: Performed by: NURSE PRACTITIONER

## 2018-02-13 RX ADMIN — GADOBENATE DIMEGLUMINE 17.5 ML: 529 INJECTION, SOLUTION INTRAVENOUS at 14:34

## 2018-02-14 DIAGNOSIS — Z95.818 STATUS POST PLACEMENT OF IMPLANTABLE LOOP RECORDER: Primary | ICD-10-CM

## 2018-02-14 DIAGNOSIS — I63.9 CRYPTOGENIC STROKE (HCC): ICD-10-CM

## 2018-02-14 PROCEDURE — 93299 PR REM INTERROG ICPMS/SCRMS <30 D TECH REVIEW: CPT | Performed by: INTERNAL MEDICINE

## 2018-02-14 PROCEDURE — 93298 REM INTERROG DEV EVAL SCRMS: CPT | Performed by: INTERNAL MEDICINE

## 2018-02-28 ENCOUNTER — OFFICE VISIT (OUTPATIENT)
Dept: NEUROLOGY | Age: 53
End: 2018-02-28
Payer: MEDICAID

## 2018-02-28 VITALS
HEIGHT: 62 IN | DIASTOLIC BLOOD PRESSURE: 81 MMHG | WEIGHT: 188 LBS | BODY MASS INDEX: 34.6 KG/M2 | HEART RATE: 96 BPM | SYSTOLIC BLOOD PRESSURE: 126 MMHG

## 2018-02-28 DIAGNOSIS — R26.9 GAIT ABNORMALITY: ICD-10-CM

## 2018-02-28 DIAGNOSIS — R90.89 ABNORMAL FINDING ON MRI OF BRAIN: ICD-10-CM

## 2018-02-28 DIAGNOSIS — R53.1 WEAKNESS: ICD-10-CM

## 2018-02-28 DIAGNOSIS — I63.9 CEREBROVASCULAR ACCIDENT (CVA) DETERMINED BY CLINICAL ASSESSMENT (HCC): Primary | ICD-10-CM

## 2018-02-28 DIAGNOSIS — I69.391 DYSPHAGIA DUE TO RECENT CEREBRAL INFARCTION: ICD-10-CM

## 2018-02-28 DIAGNOSIS — R41.3 MEMORY LOSS: ICD-10-CM

## 2018-02-28 PROCEDURE — G8427 DOCREV CUR MEDS BY ELIG CLIN: HCPCS | Performed by: PSYCHIATRY & NEUROLOGY

## 2018-02-28 PROCEDURE — 3014F SCREEN MAMMO DOC REV: CPT | Performed by: PSYCHIATRY & NEUROLOGY

## 2018-02-28 PROCEDURE — 1111F DSCHRG MED/CURRENT MED MERGE: CPT | Performed by: PSYCHIATRY & NEUROLOGY

## 2018-02-28 PROCEDURE — 3017F COLORECTAL CA SCREEN DOC REV: CPT | Performed by: PSYCHIATRY & NEUROLOGY

## 2018-02-28 PROCEDURE — 99214 OFFICE O/P EST MOD 30 MIN: CPT | Performed by: PSYCHIATRY & NEUROLOGY

## 2018-02-28 PROCEDURE — G8598 ASA/ANTIPLAT THER USED: HCPCS | Performed by: PSYCHIATRY & NEUROLOGY

## 2018-02-28 PROCEDURE — 4004F PT TOBACCO SCREEN RCVD TLK: CPT | Performed by: PSYCHIATRY & NEUROLOGY

## 2018-02-28 PROCEDURE — G8417 CALC BMI ABV UP PARAM F/U: HCPCS | Performed by: PSYCHIATRY & NEUROLOGY

## 2018-02-28 PROCEDURE — G8484 FLU IMMUNIZE NO ADMIN: HCPCS | Performed by: PSYCHIATRY & NEUROLOGY

## 2018-02-28 NOTE — PROGRESS NOTES
Chief Complaint   Patient presents with    New Patient     pt was seen in the hospital for a stroke        Jennifer Canchola is a 48y.o. year old female who presented to [facility] ER on 1/1/8 with c/o left sided weakness & facial drooping,right-sided numbness,dizziness, dysarthria that had started about 1 week prior. CT of head done in ER revealed right frontal lobe hypodensities are most consistent with nonhemorrhagic subacute ischemia of the right middle cerebral artery distribution. She was admitted for further treatment with consult for neurology. MRI of brain done revealed several areas of small acute infarction in the right hemisphere,as well as a 17x42 mm enhancing lesion on the right basal ganglia. It was felt the infarcts were consistent with embolic process. Cardiology was consulted for evaluation WAQAR and loop recorder. She was seen by Dr. Shari Mcdonald and taken for WAQAR and loop recorder placement on 1/4/18. She tolerated the procedure well.  Neurosurgery was consulted to evaluate lesion. She was seen by Dr. Rajendra Chinchilla. Dr. Shantelle Willett believed that the findings of the MRI are consistent with an ischemic event followed by a small amount of hemorrhage that is resulting in cerebral edema, however, the possibility of an underlying mass is not entirely excluded. [facility] will follow her very closely to watch the dynamics of the mass. He felt that a biopsy of a deep cerebral lesion that could possibly be a stroke poses more risk than benefit at this time and recommended repeat MRI brain in 4-6 weeks with an outpatient follow up. She continues to have left sided weakness,dysarthria and dysphasia. She was evaluated by SPT therapy and is currently on mechanical soft diet with nectar thick liquids. She is participating in PT/OT as well. She currently lives with her  at [facility].  She is felt to need a stay on Rehab for continue PT/OT/SPT repeat MRI reveal evolving stroke with bilateral BG calcification. Active Ambulatory Problems     Diagnosis Date Noted    Suicidal ideation     Major depressive disorder, recurrent, severe without psychotic features ([address] 2016    Cerebrovascular accident (CVA) ([address] 2018    Essential hypertension 2018    Hemiplegia affecting non-dominant side, post-stroke ([address] 2018    Gait abnormality 2018    Ataxia 2018    Numbness 2018    Dysarthria 2018    Lesion of basal ganglia     Cryptogenic stroke ([address]     Status post placement of implantable loop recorder 2018    Cerebrovascular accident (CVA) determined by clinical assessment ([address]     Abnormal finding on MRI of brain 2018    Weakness 2018    Memory loss 2018    Dysphagia due to recent cerebral infarction 2018     Resolved Ambulatory Problems     Diagnosis Date Noted    No Resolved Ambulatory Problems     Past Medical History:   Diagnosis Date    Anxiety     Cryptogenic stroke ([address]     Depression     Hypertension     Status post placement of implantable loop recorder 2018       Past Surgical History:   Procedure Laterality Date     SECTION      x2    CHOLECYSTECTOMY      COSMETIC SURGERY      HAND SURGERY         Family History   Problem Relation Age of Onset    Mental Illness Mother     Diabetes Mother     Cancer Mother     Mental Illness Father        Allergies   Allergen Reactions    Penicillins Hives       Social History     Social History    Marital status:      Spouse name: N/A    Number of children: N/A    Years of education: N/A     Occupational History    Not on file.      Social History Main Topics    Smoking status: Current Every Day Smoker     Packs/day: 0.50    Smokeless tobacco: Never Used    Alcohol use Yes      Comment: Occassionally    Drug use: No    Sexual activity: Not on file     Other Topics Concern    Not on file     Social History Narrative    No narrative good expansion,   normal effort without use of accessory muscles  Musculoskeletal - no significant wasting of muscles noted, no bony deformities  Extremities-no clubbing, cyanosis or edema  Skin - warm, dry, and intact. No rash, erythema, or pallor. Psychiatric - mood, affect, and behavior appear normal.      Neurological exam  Awake, alert, fluent oriented  appropriate affect  Attention and concentration appear appropriate  Recent and remote memory appears impaired mildly  Speech with mild dysarthria  No clear issues with language of fund of knowledge    Cranial Nerve Exam   CN II- Visual fields grossly unremarkable  CN III, IV,VI-EOMI, No nystagmus, conjugate eye movements, no ptosis    CN VII-no facial assymetry      Motor Exam  giveway on the left with some spasticity      Tremors- no tremors in hands or head noted    Gait  Hemiplegic gait left      Lab Results   Component Value Date    KIHIUVLY15 360 01/06/2018     Lab Results   Component Value Date    WBC 6.6 01/29/2018    HGB 12.6 01/29/2018    HCT 38.4 01/29/2018    MCV 99.7 (H) 01/29/2018     01/29/2018     Lab Results   Component Value Date     01/06/2018    K 3.8 01/06/2018     01/06/2018    CO2 28 01/06/2018    BUN 12 01/06/2018    CREATININE 0.7 01/06/2018    GLUCOSE 97 01/06/2018    CALCIUM 9.1 01/06/2018    PROT 6.7 01/06/2018    LABALBU 3.7 01/06/2018    BILITOT <0.2 01/06/2018    ALKPHOS 63 01/06/2018    AST 16 01/06/2018    ALT 19 01/06/2018    LABGLOM >60 01/06/2018    GLOB 3.0 11/12/2016     Impression   1.  Evolving changes related to prior right basal ganglia infarct. 2.  Abnormal calcium deposition bilateral basal ganglia and dentate   nuclei, reflecting calcium metabolism dysfunction. Signed by Dr Lopez Done on 2/13/2018 3:15 PM           Assessment    ICD-10-CM ICD-9-CM    1. Cerebrovascular accident (CVA) determined by clinical assessment ([facility] I63.9 434.91    2.  Abnormal finding on MRI of brain R90.89 793.0

## 2018-03-01 ENCOUNTER — OFFICE VISIT (OUTPATIENT)
Dept: NEUROSURGERY | Age: 53
End: 2018-03-01
Payer: MEDICAID

## 2018-03-01 VITALS
HEIGHT: 62 IN | SYSTOLIC BLOOD PRESSURE: 123 MMHG | HEART RATE: 93 BPM | BODY MASS INDEX: 34.6 KG/M2 | DIASTOLIC BLOOD PRESSURE: 78 MMHG | WEIGHT: 188 LBS | OXYGEN SATURATION: 97 %

## 2018-03-01 DIAGNOSIS — G25.9 LESION OF BASAL GANGLIA: ICD-10-CM

## 2018-03-01 DIAGNOSIS — I63.40 CEREBROVASCULAR ACCIDENT (CVA) DUE TO EMBOLISM OF CEREBRAL ARTERY (HCC): Primary | ICD-10-CM

## 2018-03-01 DIAGNOSIS — R90.89 ABNORMAL FINDING ON MRI OF BRAIN: ICD-10-CM

## 2018-03-01 PROCEDURE — G8417 CALC BMI ABV UP PARAM F/U: HCPCS | Performed by: NEUROLOGICAL SURGERY

## 2018-03-01 PROCEDURE — 4004F PT TOBACCO SCREEN RCVD TLK: CPT | Performed by: NEUROLOGICAL SURGERY

## 2018-03-01 PROCEDURE — G8427 DOCREV CUR MEDS BY ELIG CLIN: HCPCS | Performed by: NEUROLOGICAL SURGERY

## 2018-03-01 PROCEDURE — G8598 ASA/ANTIPLAT THER USED: HCPCS | Performed by: NEUROLOGICAL SURGERY

## 2018-03-01 PROCEDURE — G8484 FLU IMMUNIZE NO ADMIN: HCPCS | Performed by: NEUROLOGICAL SURGERY

## 2018-03-01 PROCEDURE — 99213 OFFICE O/P EST LOW 20 MIN: CPT | Performed by: NEUROLOGICAL SURGERY

## 2018-03-01 PROCEDURE — 3017F COLORECTAL CA SCREEN DOC REV: CPT | Performed by: NEUROLOGICAL SURGERY

## 2018-03-01 PROCEDURE — 3014F SCREEN MAMMO DOC REV: CPT | Performed by: NEUROLOGICAL SURGERY

## 2018-03-01 ASSESSMENT — ENCOUNTER SYMPTOMS
COUGH: 0
SORE THROAT: 0
HEMOPTYSIS: 0
SHORTNESS OF BREATH: 0
EYES NEGATIVE: 1
GASTROINTESTINAL NEGATIVE: 1
SINUS PAIN: 0
WHEEZING: 1
SPUTUM PRODUCTION: 0
STRIDOR: 0

## 2018-03-01 NOTE — PROGRESS NOTES
Hospital follow up    Review of Systems   Constitutional: Positive for malaise/fatigue. Negative for chills, diaphoresis, fever and weight loss. HENT: Positive for ear pain and tinnitus. Negative for congestion, ear discharge, hearing loss, nosebleeds, sinus pain and sore throat. Eyes: Negative. Respiratory: Positive for wheezing. Negative for cough, hemoptysis, sputum production, shortness of breath and stridor. Cardiovascular: Negative. Gastrointestinal: Negative. Genitourinary: Negative. Musculoskeletal: Negative. Skin: Negative. Neurological: Positive for headaches. Negative for dizziness, tingling, tremors, sensory change, speech change, focal weakness, seizures, loss of consciousness and weakness. Stroke   Endo/Heme/Allergies: Negative. Psychiatric/Behavioral: Positive for depression and memory loss. Negative for hallucinations, substance abuse and suicidal ideas. The patient is nervous/anxious. The patient does not have insomnia.

## 2018-03-01 NOTE — PROGRESS NOTES
patient appears well-developed and well-nourished. Eyes - conjunctiva normal.  Conjugate gaze  Ear, nose, throat -No scars, masses, or lesions over external nose or ears, no atrophy of tongue  Neck-symmetric, no masses noted, no jugular vein distension  Respiration- chest wall appears symmetric, good expansion, normal effort without use of accessory muscles  Musculoskeletal - no significant wasting of muscles noted, no bony deformities, gait no gross ataxia  Extremities-no clubbing, cyanosis or edema  Skin - warm, dry, and intact. No rash, erythema, or pallor. Psychiatric - mood, affect, and behavior appear normal.     Neurologic Examination  Awake, Alert and oriented x 4  Normal speech pattern, following commands  Motor 5/5 all extremities  No deficits to light touch     Hemiplegic left gait pattern; walk with a walker         DATA and IMAGING:    Lab Results   Component Value Date    WBC 6.6 01/29/2018    HGB 12.6 01/29/2018    HCT 38.4 01/29/2018    MCV 99.7 (H) 01/29/2018     01/29/2018     Lab Results   Component Value Date     01/06/2018    K 3.8 01/06/2018     01/06/2018    CO2 28 01/06/2018    BUN 12 01/06/2018    CREATININE 0.7 01/06/2018    GLUCOSE 97 01/06/2018    CALCIUM 9.1 01/06/2018    PROT 6.7 01/06/2018    LABALBU 3.7 01/06/2018    BILITOT <0.2 01/06/2018    ALKPHOS 63 01/06/2018    AST 16 01/06/2018    ALT 19 01/06/2018    LABGLOM >60 01/06/2018    GLOB 3.0 11/12/2016     Lab Results   Component Value Date    INR 1.03 01/01/2018    PROTIME 13.4 01/01/2018    No results found. Narrative   EXAM: MR BRAIN WITHOUT AND WITH IV CONTRAST 2/13/2018   COMPARISON: MRI lumbar brain dated 1/2/2018. HISTORY: 48years-old Female.  I63.9   TECHNIQUE:    Routine pulse sequences were obtained of the brain before and after   the administration of IV contrast.    REPORT:    Interval development of intermediate signal in the right basal   ganglia, with persistent T2/FLAIR abnormal signal, but decreased in   edema, consistent with evolution related to infarct. There is blood   products deposition as well as patchy enhancement, which is not   unusual in the setting of subacute to chronic infarct. T1 shortening in the bilateral basal ganglia and dentate nuclei,   reflecting calcium metabolism dysfunction and including far disease in   the differential.   There is no midline shift. No acute hydrocephalus. No suspicious   extra-axial fluid collection. The sella, parasellar region, brainstem   and cerebellum are otherwise unremarkable. The flow voids of Nulato Bowers are maintained centrally. The dural   sinuses are patent. The mastoid air cells are clear. Paranasal sinuses are free of   obstructive mucosal disease. The intraorbital structures demonstrate   no acute pathology.       Impression   1.  Evolving changes related to prior right basal ganglia infarct. 2.  Abnormal calcium deposition bilateral basal ganglia and dentate   nuclei, reflecting calcium metabolism dysfunction. Signed by Dr Marylee Landry on 2/13/2018 3:15 PM     I have personally reviewed these images and my interpretation is:  Compared to MRI on 1/02/2018, there is increase periventricular FLAIR signal as well as hypointensity on gradient echo sequences. This is likely and evolving infarction. ASSESSMENT:   Maxine Muhammad is a 48 y.o. female who I evaluated in the hospital for right sided numbness and left sided weakness and slurred speech. PLAN:  Ms. [place]air looks much better than she did during her hospital visit.   -No need for repeat imaging at this time.    -Follow up PRN         ICD-10-CM ICD-9-CM    1. Cerebrovascular accident (CVA) due to embolism of cerebral artery (HCC) I63.40 434.11    2. Lesion of basal ganglia G25.9 333.90    3.  Abnormal finding on MRI of brain R90.89 793.0         Reji Lopez DO

## 2018-03-13 ENCOUNTER — OFFICE VISIT (OUTPATIENT)
Dept: CARDIOLOGY | Age: 53
End: 2018-03-13
Payer: MEDICAID

## 2018-03-13 VITALS
BODY MASS INDEX: 34.96 KG/M2 | SYSTOLIC BLOOD PRESSURE: 128 MMHG | HEART RATE: 87 BPM | DIASTOLIC BLOOD PRESSURE: 82 MMHG | WEIGHT: 190 LBS | HEIGHT: 62 IN

## 2018-03-13 DIAGNOSIS — I63.9 CRYPTOGENIC STROKE (HCC): Primary | ICD-10-CM

## 2018-03-13 DIAGNOSIS — Z95.818 STATUS POST PLACEMENT OF IMPLANTABLE LOOP RECORDER: ICD-10-CM

## 2018-03-13 DIAGNOSIS — E78.2 MIXED HYPERLIPIDEMIA: ICD-10-CM

## 2018-03-13 PROCEDURE — G8598 ASA/ANTIPLAT THER USED: HCPCS | Performed by: NURSE PRACTITIONER

## 2018-03-13 PROCEDURE — G8417 CALC BMI ABV UP PARAM F/U: HCPCS | Performed by: NURSE PRACTITIONER

## 2018-03-13 PROCEDURE — 93291 INTERROG DEV EVAL SCRMS IP: CPT | Performed by: NURSE PRACTITIONER

## 2018-03-13 PROCEDURE — 3017F COLORECTAL CA SCREEN DOC REV: CPT | Performed by: NURSE PRACTITIONER

## 2018-03-13 PROCEDURE — 3014F SCREEN MAMMO DOC REV: CPT | Performed by: NURSE PRACTITIONER

## 2018-03-13 PROCEDURE — 4004F PT TOBACCO SCREEN RCVD TLK: CPT | Performed by: NURSE PRACTITIONER

## 2018-03-13 PROCEDURE — 99024 POSTOP FOLLOW-UP VISIT: CPT | Performed by: NURSE PRACTITIONER

## 2018-03-13 PROCEDURE — G8427 DOCREV CUR MEDS BY ELIG CLIN: HCPCS | Performed by: NURSE PRACTITIONER

## 2018-03-13 PROCEDURE — G8482 FLU IMMUNIZE ORDER/ADMIN: HCPCS | Performed by: NURSE PRACTITIONER

## 2018-03-13 NOTE — PROGRESS NOTES
discharge. Respiratory - no significant wheezing, stridor, apnea or cough. No dyspnea on exertion or shortness of air. Cardiovascular - no exertional chest pain to suggest myocardial ischemia. No orthopnea or PND. No sensation of sustained arrythmia. No occurrence of slow heart rate. No palpitations. No claudication. No leg edema. Gastrointestinal - no abdominal swelling or pain. No blood in stool. No severe constipation, diarrhea, nausea, or vomiting. Genitourinary - no dysuria, frequency, or urgency. No flank pain or hematuria. Musculoskeletal - no back pain or myalgia. Ambulates with walker. Extremities - no clubbing, cyanosis or edema. Skin - no color change or rash. No pallor. No new surgical incision. Neurologic - no speech difficulty or facial asymmetry. No seizures, presyncope or syncope. No significant dizziness. + residual left sided weakness s/p cryptogenic CVA. Hematologic - no easy bruising or excessive bleeding. Psychiatric - no severe anxiety or insomnia. No confusion. All other review of systems are negative. Objective  Vital Signs - /82   Pulse 87   Ht 5' 2\" (1.575 m)   Wt 190 lb (86.2 kg)   BMI 34.75 kg/m²   General - Imelda Batista is alert, cooperative, and pleasant. Well groomed. No acute distress. Body habitus - Body mass index is 34.75 kg/m². HEENT - Head is normocephalic. No circumoral cyanosis. Dentition is normal.  EYES -   Lids normal without ptosis. No discharge, edema or subconjunctival hemorrhage. Neck - Symmetrical without apparent mass or lymphadenopathy. Respiratory - Normal respiratory effort without use of accessory muscles. Ausculatation reveals vesicular breath sounds without crackles, wheezes, rub or rhonchi. Cardiovascular - No jugular venous distention. Auscultation reveals regular rate and rhythm. No audible clicks, gallop or rub. No murmur. No lower extremity varicosities. No carotid bruits.     Abdominal -  No visible

## 2018-03-13 NOTE — PATIENT INSTRUCTIONS
step you can take to protect your heart. It is never too late to quit. You will get health benefits right away. ? · Limit alcohol to 2 drinks a day for men and 1 drink a day for women. Too much alcohol can cause health problems. Medicines  ? · Take your medicines exactly as prescribed. Call your doctor if you think you are having a problem with your medicine. ? · If your doctor recommends aspirin, take the amount directed each day. Make sure you take aspirin and not another kind of pain reliever, such as acetaminophen (Tylenol). If you take ibuprofen (such as Advil or Motrin) for other problems, take aspirin at least 2 hours before taking ibuprofen. When should you call for help? Call 911 if you have symptoms of a heart attack. These may include:  ? · Chest pain or pressure, or a strange feeling in the chest.   ? · Sweating. ? · Shortness of breath. ? · Pain, pressure, or a strange feeling in the back, neck, jaw, or upper belly or in one or both shoulders or arms. ? · Lightheadedness or sudden weakness. ? · A fast or irregular heartbeat. ? After you call 911, the  may tell you to chew 1 adult-strength or 2 to 4 low-dose aspirin. Wait for an ambulance. Do not try to drive yourself. ? Watch closely for changes in your health, and be sure to contact your doctor if you have any problems. Where can you learn more? Go to https://[employer]. org and sign in to your [employer] account. Enter A868 in the [facility] box to learn more about \"A Healthy Heart: Care Instructions. \"     If you do not have an account, please click on the \"Sign Up Now\" link. Current as of: September 21, 2016  Content Version: 11.5  © 2990-0302 Healthwise, [employer]. Care instructions adapted under license by [facility] ([facility]).  If you have questions about a medical condition or this instruction, always ask your healthcare professional. Daniela Haywood disclaims any warranty or liability

## 2018-03-21 DIAGNOSIS — Z95.818 STATUS POST PLACEMENT OF IMPLANTABLE LOOP RECORDER: Primary | ICD-10-CM

## 2018-03-21 DIAGNOSIS — I63.9 CRYPTOGENIC STROKE (HCC): ICD-10-CM

## 2018-03-21 PROCEDURE — 93299 PR REM INTERROG ICPMS/SCRMS <30 D TECH REVIEW: CPT | Performed by: NURSE PRACTITIONER

## 2018-03-21 PROCEDURE — 93298 REM INTERROG DEV EVAL SCRMS: CPT | Performed by: NURSE PRACTITIONER

## 2018-05-02 DIAGNOSIS — I63.9 CRYPTOGENIC STROKE (HCC): ICD-10-CM

## 2018-05-02 DIAGNOSIS — Z95.818 STATUS POST PLACEMENT OF IMPLANTABLE LOOP RECORDER: Primary | ICD-10-CM

## 2018-05-02 PROCEDURE — 93298 REM INTERROG DEV EVAL SCRMS: CPT | Performed by: INTERNAL MEDICINE

## 2018-05-02 PROCEDURE — 93299 PR REM INTERROG ICPMS/SCRMS <30 D TECH REVIEW: CPT | Performed by: INTERNAL MEDICINE

## 2018-05-30 ENCOUNTER — OFFICE VISIT (OUTPATIENT)
Dept: NEUROLOGY | Age: 53
End: 2018-05-30
Payer: MEDICAID

## 2018-05-30 VITALS
HEIGHT: 62 IN | SYSTOLIC BLOOD PRESSURE: 134 MMHG | BODY MASS INDEX: 32.57 KG/M2 | HEART RATE: 79 BPM | WEIGHT: 177 LBS | DIASTOLIC BLOOD PRESSURE: 94 MMHG

## 2018-05-30 DIAGNOSIS — R26.9 GAIT ABNORMALITY: ICD-10-CM

## 2018-05-30 DIAGNOSIS — R90.89 ABNORMAL FINDING ON MRI OF BRAIN: ICD-10-CM

## 2018-05-30 DIAGNOSIS — I63.9 CEREBROVASCULAR ACCIDENT (CVA) DETERMINED BY CLINICAL ASSESSMENT (HCC): Primary | ICD-10-CM

## 2018-05-30 DIAGNOSIS — R41.3 MEMORY LOSS: ICD-10-CM

## 2018-05-30 DIAGNOSIS — I69.391 DYSPHAGIA DUE TO RECENT CEREBRAL INFARCTION: ICD-10-CM

## 2018-05-30 DIAGNOSIS — R53.1 WEAKNESS: ICD-10-CM

## 2018-05-30 PROCEDURE — G8417 CALC BMI ABV UP PARAM F/U: HCPCS | Performed by: PSYCHIATRY & NEUROLOGY

## 2018-05-30 PROCEDURE — 99214 OFFICE O/P EST MOD 30 MIN: CPT | Performed by: PSYCHIATRY & NEUROLOGY

## 2018-05-30 PROCEDURE — G8598 ASA/ANTIPLAT THER USED: HCPCS | Performed by: PSYCHIATRY & NEUROLOGY

## 2018-05-30 PROCEDURE — 4004F PT TOBACCO SCREEN RCVD TLK: CPT | Performed by: PSYCHIATRY & NEUROLOGY

## 2018-05-30 PROCEDURE — G8427 DOCREV CUR MEDS BY ELIG CLIN: HCPCS | Performed by: PSYCHIATRY & NEUROLOGY

## 2018-05-30 PROCEDURE — 3017F COLORECTAL CA SCREEN DOC REV: CPT | Performed by: PSYCHIATRY & NEUROLOGY

## 2018-05-30 RX ORDER — GABAPENTIN 300 MG/1
300 CAPSULE ORAL 3 TIMES DAILY
Status: ON HOLD | COMMUNITY
End: 2020-04-27 | Stop reason: HOSPADM

## 2018-06-19 DIAGNOSIS — Z95.818 STATUS POST PLACEMENT OF IMPLANTABLE LOOP RECORDER: Primary | ICD-10-CM

## 2018-06-19 DIAGNOSIS — I63.9 CRYPTOGENIC STROKE (HCC): ICD-10-CM

## 2018-06-19 PROCEDURE — 93299 PR REM INTERROG ICPMS/SCRMS <30 D TECH REVIEW: CPT | Performed by: CLINICAL NURSE SPECIALIST

## 2018-06-19 PROCEDURE — 93298 REM INTERROG DEV EVAL SCRMS: CPT | Performed by: CLINICAL NURSE SPECIALIST

## 2018-07-03 ENCOUNTER — OFFICE VISIT (OUTPATIENT)
Dept: CARDIOLOGY | Age: 53
End: 2018-07-03
Payer: MEDICAID

## 2018-07-03 VITALS
BODY MASS INDEX: 29.71 KG/M2 | HEIGHT: 64 IN | DIASTOLIC BLOOD PRESSURE: 68 MMHG | SYSTOLIC BLOOD PRESSURE: 110 MMHG | WEIGHT: 174 LBS | HEART RATE: 105 BPM

## 2018-07-03 DIAGNOSIS — I63.9 CRYPTOGENIC STROKE (HCC): Primary | ICD-10-CM

## 2018-07-03 DIAGNOSIS — Z95.818 STATUS POST PLACEMENT OF IMPLANTABLE LOOP RECORDER: ICD-10-CM

## 2018-07-03 PROCEDURE — G8427 DOCREV CUR MEDS BY ELIG CLIN: HCPCS | Performed by: CLINICAL NURSE SPECIALIST

## 2018-07-03 PROCEDURE — G8598 ASA/ANTIPLAT THER USED: HCPCS | Performed by: CLINICAL NURSE SPECIALIST

## 2018-07-03 PROCEDURE — G8417 CALC BMI ABV UP PARAM F/U: HCPCS | Performed by: CLINICAL NURSE SPECIALIST

## 2018-07-03 PROCEDURE — 3017F COLORECTAL CA SCREEN DOC REV: CPT | Performed by: CLINICAL NURSE SPECIALIST

## 2018-07-03 PROCEDURE — 4004F PT TOBACCO SCREEN RCVD TLK: CPT | Performed by: CLINICAL NURSE SPECIALIST

## 2018-07-03 PROCEDURE — 93291 INTERROG DEV EVAL SCRMS IP: CPT | Performed by: CLINICAL NURSE SPECIALIST

## 2018-07-03 PROCEDURE — 99213 OFFICE O/P EST LOW 20 MIN: CPT | Performed by: CLINICAL NURSE SPECIALIST

## 2018-07-03 ASSESSMENT — ENCOUNTER SYMPTOMS
COUGH: 0
HEARTBURN: 0
VOMITING: 0
NAUSEA: 0
SHORTNESS OF BREATH: 0
BLURRED VISION: 0
ORTHOPNEA: 0

## 2018-07-03 NOTE — PATIENT INSTRUCTIONS
Return in about 6 months (around 1/3/2019) for APRN. Continue to monitor Loop recorder  Use symptom marker as needed    Call with any questions or concerns  Follow up with JOHN Christine for non cardiac problems  Report any new problems  Cardiovascular Fitness-Exercise as tolerated. Strive for 15 minutes of exercise most days of the week. Cardiac / Healthy Diet  Continue current medications as directed  Continue plan of treatment  It is always recommended that you bring your medications bottles with you to each visit - this is for your safety!

## 2018-07-03 NOTE — PROGRESS NOTES
[facility], [address] [place] [address], [address]   Phone: [phone]  Fax: [phone]    OFFICE VISIT:  7/3/2018    Mignon Parra - : 1965    Reason For Visit:  Moises Almonte is a 48 y.o. female who is here for 3 Month Follow-Up (and loop recorder recheck, patient denies any cardiac symptoms.)    HPI   Patient is here for follow-up with a history of cryptogenic CVA and an implanted loop recorder. Loop recorder was implanted in 2018. Thus far there is been no atrial fibrillation noted. Patient denies any palpitations or fast heart rate area there is no complaints of chest pain, dyspnea, edema, or strokelike symptoms    JOHN Anderson is PCP.   Mignon Parra has the following history as recorded in [facility]:    Patient Active Problem List    Diagnosis Date Noted    Mixed hyperlipidemia 2018    Abnormal finding on MRI of brain 2018    Weakness 2018    Memory loss 2018    Dysphagia due to recent cerebral infarction 2018    Status post placement of implantable loop recorder 2018    Cryptogenic stroke [facility])     Cerebrovascular accident (CVA) determined by clinical assessment ([address]     Lesion of basal ganglia     Hemiplegia affecting non-dominant side, post-stroke ([address] 2018    Gait abnormality 2018    Ataxia 2018    Numbness 2018    Dysarthria 2018    Cerebrovascular accident (CVA) ([address] 2018    Essential hypertension 2018    Major depressive disorder, recurrent, severe without psychotic features ([address] 2016    Suicidal ideation      Past Medical History:   Diagnosis Date    Anxiety     Cryptogenic stroke ([address]     Depression     Hypertension     Mixed hyperlipidemia 3/13/2018    Status post placement of implantable loop recorder 2018     Past Surgical History:   Procedure Laterality Date     SECTION      x2    CHOLECYSTECTOMY     

## 2018-08-09 DIAGNOSIS — I63.9 CRYPTOGENIC STROKE (HCC): ICD-10-CM

## 2018-08-09 DIAGNOSIS — Z95.818 STATUS POST PLACEMENT OF IMPLANTABLE LOOP RECORDER: Primary | ICD-10-CM

## 2018-08-09 PROCEDURE — 93299 PR REM INTERROG ICPMS/SCRMS <30 D TECH REVIEW: CPT | Performed by: CLINICAL NURSE SPECIALIST

## 2018-08-09 PROCEDURE — 93298 REM INTERROG DEV EVAL SCRMS: CPT | Performed by: CLINICAL NURSE SPECIALIST

## 2018-09-20 DIAGNOSIS — Z95.818 STATUS POST PLACEMENT OF IMPLANTABLE LOOP RECORDER: Primary | ICD-10-CM

## 2018-09-20 DIAGNOSIS — I63.9 CRYPTOGENIC STROKE (HCC): ICD-10-CM

## 2018-09-20 PROCEDURE — 93298 REM INTERROG DEV EVAL SCRMS: CPT | Performed by: CLINICAL NURSE SPECIALIST

## 2018-09-20 PROCEDURE — 93299 PR REM INTERROG ICPMS/SCRMS <30 D TECH REVIEW: CPT | Performed by: CLINICAL NURSE SPECIALIST

## 2018-10-31 DIAGNOSIS — Z95.818 STATUS POST PLACEMENT OF IMPLANTABLE LOOP RECORDER: Primary | ICD-10-CM

## 2018-10-31 DIAGNOSIS — I63.9 CRYPTOGENIC STROKE (HCC): ICD-10-CM

## 2018-10-31 DIAGNOSIS — R42 DIZZINESS: ICD-10-CM

## 2018-10-31 PROCEDURE — 93298 REM INTERROG DEV EVAL SCRMS: CPT | Performed by: CLINICAL NURSE SPECIALIST

## 2018-10-31 PROCEDURE — 93299 PR REM INTERROG ICPMS/SCRMS <30 D TECH REVIEW: CPT | Performed by: CLINICAL NURSE SPECIALIST

## 2018-11-26 ENCOUNTER — HOSPITAL ENCOUNTER (OUTPATIENT)
Dept: PREADMISSION TESTING | Age: 53
Discharge: HOME | End: 2018-11-30
Payer: MEDICARE

## 2018-11-26 VITALS — WEIGHT: 165 LBS | BODY MASS INDEX: 28.17 KG/M2 | HEIGHT: 64 IN

## 2018-11-26 LAB
ANION GAP SERPL CALCULATED.3IONS-SCNC: 10 MMOL/L (ref 7–19)
BASOPHILS ABSOLUTE: 0 K/UL (ref 0–0.2)
BASOPHILS RELATIVE PERCENT: 0.7 % (ref 0–1)
BUN BLDV-MCNC: 14 MG/DL (ref 6–20)
CALCIUM SERPL-MCNC: 9.2 MG/DL (ref 8.6–10)
CHLORIDE BLD-SCNC: 110 MMOL/L (ref 98–111)
CO2: 23 MMOL/L (ref 22–29)
CREAT SERPL-MCNC: 0.8 MG/DL (ref 0.5–0.9)
EOSINOPHILS ABSOLUTE: 0.3 K/UL (ref 0–0.6)
EOSINOPHILS RELATIVE PERCENT: 4.8 % (ref 0–5)
GFR NON-AFRICAN AMERICAN: >60
GLUCOSE BLD-MCNC: 74 MG/DL (ref 74–109)
HCT VFR BLD CALC: 37.7 % (ref 37–47)
HEMOGLOBIN: 12.2 G/DL (ref 12–16)
LYMPHOCYTES ABSOLUTE: 2.1 K/UL (ref 1.1–4.5)
LYMPHOCYTES RELATIVE PERCENT: 34.9 % (ref 20–40)
MCH RBC QN AUTO: 32.2 PG (ref 27–31)
MCHC RBC AUTO-ENTMCNC: 32.4 G/DL (ref 33–37)
MCV RBC AUTO: 99.5 FL (ref 81–99)
MONOCYTES ABSOLUTE: 0.5 K/UL (ref 0–0.9)
MONOCYTES RELATIVE PERCENT: 8.8 % (ref 0–10)
NEUTROPHILS ABSOLUTE: 3.1 K/UL (ref 1.5–7.5)
NEUTROPHILS RELATIVE PERCENT: 50.6 % (ref 50–65)
PDW BLD-RTO: 14.3 % (ref 11.5–14.5)
PLATELET # BLD: 216 K/UL (ref 130–400)
PMV BLD AUTO: 10.5 FL (ref 9.4–12.3)
POTASSIUM SERPL-SCNC: 4.1 MMOL/L (ref 3.5–5)
RBC # BLD: 3.79 M/UL (ref 4.2–5.4)
SODIUM BLD-SCNC: 143 MMOL/L (ref 136–145)
WBC # BLD: 6.1 K/UL (ref 4.8–10.8)

## 2018-11-26 PROCEDURE — 85025 COMPLETE CBC W/AUTO DIFF WBC: CPT

## 2018-11-26 PROCEDURE — 93005 ELECTROCARDIOGRAM TRACING: CPT

## 2018-11-26 PROCEDURE — 80048 BASIC METABOLIC PNL TOTAL CA: CPT

## 2018-11-26 RX ORDER — LISINOPRIL 10 MG/1
10 TABLET ORAL DAILY
COMMUNITY
End: 2019-02-13

## 2018-11-27 LAB
EKG P AXIS: 29 DEGREES
EKG P-R INTERVAL: 164 MS
EKG Q-T INTERVAL: 410 MS
EKG QRS DURATION: 86 MS
EKG QTC CALCULATION (BAZETT): 443 MS
EKG T AXIS: 90 DEGREES

## 2018-11-28 DIAGNOSIS — I63.9 CRYPTOGENIC STROKE (HCC): Primary | ICD-10-CM

## 2018-11-28 DIAGNOSIS — Z95.818 STATUS POST PLACEMENT OF IMPLANTABLE LOOP RECORDER: ICD-10-CM

## 2018-11-28 PROCEDURE — 99999 PR INTERROGATION EVALUATION REMOTE </30 D ILR SYS: CPT | Performed by: CLINICAL NURSE SPECIALIST

## 2018-12-03 ENCOUNTER — HOSPITAL ENCOUNTER (OUTPATIENT)
Age: 53
Setting detail: OUTPATIENT SURGERY
Discharge: HOME | End: 2018-12-03
Attending: ORTHOPAEDIC SURGERY | Admitting: ORTHOPAEDIC SURGERY
Payer: MEDICARE

## 2018-12-03 ENCOUNTER — ANESTHESIA (OUTPATIENT)
Dept: OPERATING ROOM | Age: 53
End: 2018-12-03
Payer: MEDICARE

## 2018-12-03 ENCOUNTER — ANESTHESIA EVENT (OUTPATIENT)
Dept: OPERATING ROOM | Age: 53
End: 2018-12-03
Payer: MEDICARE

## 2018-12-03 VITALS
BODY MASS INDEX: 28.17 KG/M2 | WEIGHT: 165 LBS | TEMPERATURE: 97.8 F | DIASTOLIC BLOOD PRESSURE: 106 MMHG | HEART RATE: 72 BPM | RESPIRATION RATE: 16 BRPM | OXYGEN SATURATION: 95 % | SYSTOLIC BLOOD PRESSURE: 152 MMHG | HEIGHT: 64 IN

## 2018-12-03 VITALS
SYSTOLIC BLOOD PRESSURE: 149 MMHG | OXYGEN SATURATION: 90 % | DIASTOLIC BLOOD PRESSURE: 82 MMHG | RESPIRATION RATE: 23 BRPM

## 2018-12-03 DIAGNOSIS — Z95.818 STATUS POST PLACEMENT OF IMPLANTABLE LOOP RECORDER: Primary | ICD-10-CM

## 2018-12-03 DIAGNOSIS — I63.9 CRYPTOGENIC STROKE (HCC): ICD-10-CM

## 2018-12-03 DIAGNOSIS — M75.02 ADHESIVE CAPSULITIS OF LEFT SHOULDER: Primary | ICD-10-CM

## 2018-12-03 PROCEDURE — 2580000003 HC RX 258: Performed by: ORTHOPAEDIC SURGERY

## 2018-12-03 PROCEDURE — 3600000101 HC MANIP SHOULDER UNDER ANESTHESI: Performed by: ORTHOPAEDIC SURGERY

## 2018-12-03 PROCEDURE — 7100000011 HC PHASE II RECOVERY - ADDTL 15 MIN: Performed by: ORTHOPAEDIC SURGERY

## 2018-12-03 PROCEDURE — 7100000001 HC PACU RECOVERY - ADDTL 15 MIN: Performed by: ORTHOPAEDIC SURGERY

## 2018-12-03 PROCEDURE — 2709999900 HC NON-CHARGEABLE SUPPLY: Performed by: ORTHOPAEDIC SURGERY

## 2018-12-03 PROCEDURE — 7100000010 HC PHASE II RECOVERY - FIRST 15 MIN: Performed by: ORTHOPAEDIC SURGERY

## 2018-12-03 PROCEDURE — 6360000002 HC RX W HCPCS: Performed by: ORTHOPAEDIC SURGERY

## 2018-12-03 PROCEDURE — 3700000000 HC ANESTHESIA ATTENDED CARE: Performed by: ORTHOPAEDIC SURGERY

## 2018-12-03 PROCEDURE — 7100000000 HC PACU RECOVERY - FIRST 15 MIN: Performed by: ORTHOPAEDIC SURGERY

## 2018-12-03 PROCEDURE — 64415 NJX AA&/STRD BRCH PLXS IMG: CPT | Performed by: NURSE ANESTHETIST, CERTIFIED REGISTERED

## 2018-12-03 PROCEDURE — 2500000003 HC RX 250 WO HCPCS: Performed by: NURSE ANESTHETIST, CERTIFIED REGISTERED

## 2018-12-03 PROCEDURE — 93298 REM INTERROG DEV EVAL SCRMS: CPT | Performed by: CLINICAL NURSE SPECIALIST

## 2018-12-03 PROCEDURE — 6360000002 HC RX W HCPCS: Performed by: ANESTHESIOLOGY

## 2018-12-03 PROCEDURE — 6360000002 HC RX W HCPCS

## 2018-12-03 PROCEDURE — 6360000002 HC RX W HCPCS: Performed by: NURSE ANESTHETIST, CERTIFIED REGISTERED

## 2018-12-03 RX ORDER — MIDAZOLAM HYDROCHLORIDE 1 MG/ML
2 INJECTION INTRAMUSCULAR; INTRAVENOUS
Status: COMPLETED | OUTPATIENT
Start: 2018-12-03 | End: 2018-12-03

## 2018-12-03 RX ORDER — FENTANYL CITRATE 50 UG/ML
25 INJECTION, SOLUTION INTRAMUSCULAR; INTRAVENOUS
Status: DISCONTINUED | OUTPATIENT
Start: 2018-12-03 | End: 2018-12-03 | Stop reason: HOSPADM

## 2018-12-03 RX ORDER — HYDROCODONE BITARTRATE AND ACETAMINOPHEN 10; 325 MG/1; MG/1
TABLET ORAL
Qty: 40 TABLET | Refills: 0 | Status: SHIPPED | OUTPATIENT
Start: 2018-12-03 | End: 2018-12-10

## 2018-12-03 RX ORDER — FENTANYL CITRATE 50 UG/ML
50 INJECTION, SOLUTION INTRAMUSCULAR; INTRAVENOUS
Status: DISCONTINUED | OUTPATIENT
Start: 2018-12-03 | End: 2018-12-03 | Stop reason: HOSPADM

## 2018-12-03 RX ORDER — DEXAMETHASONE SODIUM PHOSPHATE 4 MG/ML
INJECTION, SOLUTION INTRA-ARTICULAR; INTRALESIONAL; INTRAMUSCULAR; INTRAVENOUS; SOFT TISSUE PRN
Status: DISCONTINUED | OUTPATIENT
Start: 2018-12-03 | End: 2018-12-03 | Stop reason: SDUPTHER

## 2018-12-03 RX ORDER — HYDRALAZINE HYDROCHLORIDE 20 MG/ML
5 INJECTION INTRAMUSCULAR; INTRAVENOUS EVERY 10 MIN PRN
Status: DISCONTINUED | OUTPATIENT
Start: 2018-12-03 | End: 2018-12-03 | Stop reason: HOSPADM

## 2018-12-03 RX ORDER — SODIUM CHLORIDE 0.9 % (FLUSH) 0.9 %
10 SYRINGE (ML) INJECTION EVERY 12 HOURS SCHEDULED
Status: DISCONTINUED | OUTPATIENT
Start: 2018-12-03 | End: 2018-12-03 | Stop reason: HOSPADM

## 2018-12-03 RX ORDER — ROPIVACAINE HYDROCHLORIDE 5 MG/ML
INJECTION, SOLUTION EPIDURAL; INFILTRATION; PERINEURAL PRN
Status: DISCONTINUED | OUTPATIENT
Start: 2018-12-03 | End: 2018-12-03 | Stop reason: SDUPTHER

## 2018-12-03 RX ORDER — METOCLOPRAMIDE HYDROCHLORIDE 5 MG/ML
10 INJECTION INTRAMUSCULAR; INTRAVENOUS
Status: DISCONTINUED | OUTPATIENT
Start: 2018-12-03 | End: 2018-12-03 | Stop reason: HOSPADM

## 2018-12-03 RX ORDER — MORPHINE SULFATE 2 MG/ML
2 INJECTION, SOLUTION INTRAMUSCULAR; INTRAVENOUS EVERY 5 MIN PRN
Status: DISCONTINUED | OUTPATIENT
Start: 2018-12-03 | End: 2018-12-03 | Stop reason: HOSPADM

## 2018-12-03 RX ORDER — MORPHINE SULFATE 2 MG/ML
4 INJECTION, SOLUTION INTRAMUSCULAR; INTRAVENOUS EVERY 5 MIN PRN
Status: DISCONTINUED | OUTPATIENT
Start: 2018-12-03 | End: 2018-12-03 | Stop reason: HOSPADM

## 2018-12-03 RX ORDER — MIDAZOLAM HYDROCHLORIDE 1 MG/ML
INJECTION INTRAMUSCULAR; INTRAVENOUS
Status: COMPLETED
Start: 2018-12-03 | End: 2018-12-03

## 2018-12-03 RX ORDER — ONDANSETRON 4 MG/1
4 TABLET, FILM COATED ORAL DAILY PRN
Qty: 20 TABLET | Refills: 0 | Status: ON HOLD | OUTPATIENT
Start: 2018-12-03 | End: 2020-04-27 | Stop reason: HOSPADM

## 2018-12-03 RX ORDER — SODIUM CHLORIDE 0.9 % (FLUSH) 0.9 %
10 SYRINGE (ML) INJECTION PRN
Status: DISCONTINUED | OUTPATIENT
Start: 2018-12-03 | End: 2018-12-03 | Stop reason: HOSPADM

## 2018-12-03 RX ORDER — SODIUM CHLORIDE 9 MG/ML
INJECTION, SOLUTION INTRAVENOUS CONTINUOUS
Status: DISCONTINUED | OUTPATIENT
Start: 2018-12-03 | End: 2018-12-03 | Stop reason: HOSPADM

## 2018-12-03 RX ORDER — LABETALOL HYDROCHLORIDE 5 MG/ML
5 INJECTION, SOLUTION INTRAVENOUS EVERY 10 MIN PRN
Status: DISCONTINUED | OUTPATIENT
Start: 2018-12-03 | End: 2018-12-03 | Stop reason: HOSPADM

## 2018-12-03 RX ORDER — LIDOCAINE HYDROCHLORIDE 10 MG/ML
1 INJECTION, SOLUTION EPIDURAL; INFILTRATION; INTRACAUDAL; PERINEURAL
Status: DISCONTINUED | OUTPATIENT
Start: 2018-12-03 | End: 2018-12-03 | Stop reason: HOSPADM

## 2018-12-03 RX ORDER — SODIUM CHLORIDE, SODIUM LACTATE, POTASSIUM CHLORIDE, CALCIUM CHLORIDE 600; 310; 30; 20 MG/100ML; MG/100ML; MG/100ML; MG/100ML
INJECTION, SOLUTION INTRAVENOUS CONTINUOUS
Status: DISCONTINUED | OUTPATIENT
Start: 2018-12-03 | End: 2018-12-03 | Stop reason: HOSPADM

## 2018-12-03 RX ORDER — DIPHENHYDRAMINE HYDROCHLORIDE 50 MG/ML
12.5 INJECTION INTRAMUSCULAR; INTRAVENOUS
Status: DISCONTINUED | OUTPATIENT
Start: 2018-12-03 | End: 2018-12-03 | Stop reason: HOSPADM

## 2018-12-03 RX ORDER — PROPOFOL 10 MG/ML
INJECTION, EMULSION INTRAVENOUS PRN
Status: DISCONTINUED | OUTPATIENT
Start: 2018-12-03 | End: 2018-12-03 | Stop reason: SDUPTHER

## 2018-12-03 RX ORDER — LIDOCAINE HYDROCHLORIDE 10 MG/ML
INJECTION, SOLUTION INFILTRATION; PERINEURAL PRN
Status: DISCONTINUED | OUTPATIENT
Start: 2018-12-03 | End: 2018-12-03 | Stop reason: SDUPTHER

## 2018-12-03 RX ORDER — PROMETHAZINE HYDROCHLORIDE 25 MG/ML
6.25 INJECTION, SOLUTION INTRAMUSCULAR; INTRAVENOUS
Status: DISCONTINUED | OUTPATIENT
Start: 2018-12-03 | End: 2018-12-03 | Stop reason: HOSPADM

## 2018-12-03 RX ORDER — ONDANSETRON 2 MG/ML
INJECTION INTRAMUSCULAR; INTRAVENOUS PRN
Status: DISCONTINUED | OUTPATIENT
Start: 2018-12-03 | End: 2018-12-03 | Stop reason: SDUPTHER

## 2018-12-03 RX ADMIN — DEXAMETHASONE SODIUM PHOSPHATE 4 MG: 4 INJECTION, SOLUTION INTRAMUSCULAR; INTRAVENOUS at 07:15

## 2018-12-03 RX ADMIN — MIDAZOLAM 2 MG: 1 INJECTION INTRAMUSCULAR; INTRAVENOUS at 06:51

## 2018-12-03 RX ADMIN — ROPIVACAINE HYDROCHLORIDE 20 ML: 5 INJECTION, SOLUTION EPIDURAL; INFILTRATION; PERINEURAL at 06:52

## 2018-12-03 RX ADMIN — PROPOFOL 40 MG: 10 INJECTION, EMULSION INTRAVENOUS at 07:11

## 2018-12-03 RX ADMIN — ONDANSETRON HYDROCHLORIDE 4 MG: 2 INJECTION, SOLUTION INTRAMUSCULAR; INTRAVENOUS at 07:15

## 2018-12-03 RX ADMIN — PROPOFOL 50 MG: 10 INJECTION, EMULSION INTRAVENOUS at 07:10

## 2018-12-03 RX ADMIN — SODIUM CHLORIDE, SODIUM LACTATE, POTASSIUM CHLORIDE, AND CALCIUM CHLORIDE: 600; 310; 30; 20 INJECTION, SOLUTION INTRAVENOUS at 06:05

## 2018-12-03 RX ADMIN — LIDOCAINE HYDROCHLORIDE 5 ML: 10 INJECTION, SOLUTION INFILTRATION; PERINEURAL at 07:10

## 2018-12-03 RX ADMIN — MIDAZOLAM HYDROCHLORIDE 2 MG: 1 INJECTION INTRAMUSCULAR; INTRAVENOUS at 06:51

## 2018-12-03 ASSESSMENT — LIFESTYLE VARIABLES: SMOKING_STATUS: 1

## 2018-12-03 ASSESSMENT — PAIN SCALES - GENERAL: PAINLEVEL_OUTOF10: 0

## 2018-12-03 NOTE — H&P
Pt Name: Sydnie Sevilla  MRN: [id number]  YOB: 1965  Date: 2018      HPI: Patient is a 48 y.o. female who presented to the clinic with complaints of worsening stiffness and pain of the left shoulder. It has been non responsive to physical therapy, steroid injections, and NSAIDs. Past Medical/Surgical History:   Past Medical History:   Diagnosis Date    Anxiety     Cryptogenic stroke ([address] 12/2017    X 2 WITH LOOP RECORDER PUT IN [facility]     Depression     Hypertension     Mixed hyperlipidemia 3/13/2018    Status post placement of implantable loop recorder 2018     Past Surgical History:   Procedure Laterality Date     SECTION      x2    CHOLECYSTECTOMY      COSMETIC SURGERY      HAND SURGERY      INSERTABLE CARDIAC MONITOR  2018         Social History:    Smoking:  No Smoking History = 0   Alcohol:complete abstinence from alcohol    Medications:   Prior to Admission medications    Medication Sig Start Date End Date Taking? Authorizing Provider   lisinopril (PRINIVIL;ZESTRIL) 10 MG tablet Take 10 mg by mouth daily    Historical Provider, MD   gabapentin (NEURONTIN) 300 MG capsule Take 300 mg by mouth 3 times daily. Queenie Hooper Historical Provider, MD   aspirin 81 MG EC tablet Take 1 tablet by mouth daily 18   Rodney Hickey MD   pravastatin (PRAVACHOL) 40 MG tablet Take 1 tablet by mouth nightly 18   Rodney Hickey MD   OLANZapine (ZYPREXA) 5 MG tablet Take 1 tablet by mouth nightly 18   Rodney Hickey MD   hydrOXYzine (VISTARIL) 25 MG capsule Take 25 mg by mouth 3 times daily as needed for Itching    Historical Provider, MD   FLUoxetine (PROZAC) 20 MG capsule Take 1 capsule by mouth daily 16   JOHN Sanchez   fluticasone (FLONASE) 50 MCG/ACT nasal spray 1 spray by Nasal route daily    Historical Provider, MD       Allergies:    Allergies   Allergen Reactions    Penicillins Hives       Review of systems:     * Constitutional: negative     *

## 2018-12-12 DIAGNOSIS — Z95.818 STATUS POST PLACEMENT OF IMPLANTABLE LOOP RECORDER: Primary | ICD-10-CM

## 2018-12-12 DIAGNOSIS — I63.9 CRYPTOGENIC STROKE (HCC): ICD-10-CM

## 2018-12-12 PROCEDURE — 99999 PR INTERROGATION EVALUATION REMOTE </30 D ILR SYS: CPT | Performed by: CLINICAL NURSE SPECIALIST

## 2018-12-31 DIAGNOSIS — Z95.818 STATUS POST PLACEMENT OF IMPLANTABLE LOOP RECORDER: Primary | ICD-10-CM

## 2018-12-31 DIAGNOSIS — I44.30 HEART BLOCK, AV: ICD-10-CM

## 2018-12-31 PROCEDURE — 99999 PR INTERROGATION EVALUATION REMOTE </30 D ILR SYS: CPT | Performed by: CLINICAL NURSE SPECIALIST

## 2019-01-08 ENCOUNTER — TELEPHONE (OUTPATIENT)
Dept: NEUROLOGY | Age: 54
End: 2019-01-08

## 2019-01-08 ENCOUNTER — TELEPHONE (OUTPATIENT)
Dept: CARDIOLOGY | Age: 54
End: 2019-01-08

## 2019-01-08 ENCOUNTER — OFFICE VISIT (OUTPATIENT)
Dept: CARDIOLOGY | Age: 54
End: 2019-01-08
Payer: MEDICARE

## 2019-01-08 VITALS
HEIGHT: 64 IN | BODY MASS INDEX: 28.17 KG/M2 | HEART RATE: 83 BPM | SYSTOLIC BLOOD PRESSURE: 118 MMHG | WEIGHT: 165 LBS | DIASTOLIC BLOOD PRESSURE: 82 MMHG

## 2019-01-08 DIAGNOSIS — I63.9 CRYPTOGENIC STROKE (HCC): Primary | ICD-10-CM

## 2019-01-08 DIAGNOSIS — I45.5 SINUS PAUSE: ICD-10-CM

## 2019-01-08 DIAGNOSIS — R00.1 BRADYCARDIA: ICD-10-CM

## 2019-01-08 PROCEDURE — 93291 INTERROG DEV EVAL SCRMS IP: CPT | Performed by: CLINICAL NURSE SPECIALIST

## 2019-01-08 PROCEDURE — 99213 OFFICE O/P EST LOW 20 MIN: CPT | Performed by: CLINICAL NURSE SPECIALIST

## 2019-01-08 ASSESSMENT — ENCOUNTER SYMPTOMS
VOMITING: 0
ORTHOPNEA: 0
COUGH: 0
SHORTNESS OF BREATH: 0
HEARTBURN: 0
NAUSEA: 0
BLURRED VISION: 0

## 2019-01-10 ENCOUNTER — TELEPHONE (OUTPATIENT)
Dept: CARDIOLOGY | Age: 54
End: 2019-01-10

## 2019-01-16 ENCOUNTER — OFFICE VISIT (OUTPATIENT)
Dept: NEUROLOGY | Age: 54
End: 2019-01-16
Payer: MEDICARE

## 2019-01-16 VITALS
WEIGHT: 104.4 LBS | HEIGHT: 64 IN | DIASTOLIC BLOOD PRESSURE: 67 MMHG | HEART RATE: 107 BPM | SYSTOLIC BLOOD PRESSURE: 95 MMHG | OXYGEN SATURATION: 97 % | BODY MASS INDEX: 17.83 KG/M2

## 2019-01-16 DIAGNOSIS — I63.9 CEREBROVASCULAR ACCIDENT (CVA) DETERMINED BY CLINICAL ASSESSMENT (HCC): Primary | ICD-10-CM

## 2019-01-16 DIAGNOSIS — I69.391 DYSPHAGIA DUE TO RECENT CEREBRAL INFARCTION: ICD-10-CM

## 2019-01-16 DIAGNOSIS — R26.9 GAIT ABNORMALITY: ICD-10-CM

## 2019-01-16 DIAGNOSIS — R90.89 ABNORMAL FINDING ON MRI OF BRAIN: ICD-10-CM

## 2019-01-16 DIAGNOSIS — R53.1 WEAKNESS: ICD-10-CM

## 2019-01-16 DIAGNOSIS — R41.3 MEMORY LOSS: ICD-10-CM

## 2019-01-16 PROCEDURE — 99214 OFFICE O/P EST MOD 30 MIN: CPT | Performed by: PSYCHIATRY & NEUROLOGY

## 2019-01-22 ENCOUNTER — HOSPITAL ENCOUNTER (OUTPATIENT)
Dept: MRI IMAGING | Age: 54
Discharge: HOME | End: 2019-01-22
Payer: MEDICARE

## 2019-01-22 DIAGNOSIS — I63.9 CEREBROVASCULAR ACCIDENT (CVA) DETERMINED BY CLINICAL ASSESSMENT (HCC): ICD-10-CM

## 2019-01-22 DIAGNOSIS — R90.89 ABNORMAL FINDING ON MRI OF BRAIN: ICD-10-CM

## 2019-01-22 PROCEDURE — 70553 MRI BRAIN STEM W/O & W/DYE: CPT

## 2019-01-22 PROCEDURE — 6360000004 HC RX CONTRAST MEDICATION: Performed by: PSYCHIATRY & NEUROLOGY

## 2019-01-22 PROCEDURE — A9577 INJ MULTIHANCE: HCPCS | Performed by: PSYCHIATRY & NEUROLOGY

## 2019-01-22 RX ADMIN — GADOBENATE DIMEGLUMINE 15 ML: 529 INJECTION, SOLUTION INTRAVENOUS at 15:32

## 2019-01-24 ENCOUNTER — APPOINTMENT (OUTPATIENT)
Dept: GENERAL RADIOLOGY | Age: 54
End: 2019-01-24
Attending: INTERNAL MEDICINE
Payer: MEDICARE

## 2019-01-24 ENCOUNTER — HOSPITAL ENCOUNTER (OUTPATIENT)
Dept: CARDIAC CATH/INVASIVE PROCEDURES | Age: 54
Setting detail: OBSERVATION
Discharge: HOME | End: 2019-01-25
Attending: INTERNAL MEDICINE | Admitting: INTERNAL MEDICINE
Payer: MEDICARE

## 2019-01-24 PROBLEM — I49.5 SINOATRIAL NODE DYSFUNCTION (HCC): Status: ACTIVE | Noted: 2019-01-24

## 2019-01-24 LAB
ANION GAP SERPL CALCULATED.3IONS-SCNC: 10 MMOL/L (ref 7–19)
BASOPHILS ABSOLUTE: 0 K/UL (ref 0–0.2)
BASOPHILS RELATIVE PERCENT: 0.4 % (ref 0–1)
BUN BLDV-MCNC: 32 MG/DL (ref 6–20)
CALCIUM SERPL-MCNC: 10 MG/DL (ref 8.6–10)
CHLORIDE BLD-SCNC: 99 MMOL/L (ref 98–111)
CO2: 28 MMOL/L (ref 22–29)
CREAT SERPL-MCNC: 1.2 MG/DL (ref 0.5–0.9)
EKG P AXIS: 54 DEGREES
EKG P-R INTERVAL: 158 MS
EKG Q-T INTERVAL: 392 MS
EKG QRS DURATION: 90 MS
EKG QTC CALCULATION (BAZETT): 439 MS
EKG T AXIS: 58 DEGREES
EOSINOPHILS ABSOLUTE: 0.2 K/UL (ref 0–0.6)
EOSINOPHILS RELATIVE PERCENT: 2.7 % (ref 0–5)
GFR NON-AFRICAN AMERICAN: 47
GLUCOSE BLD-MCNC: 98 MG/DL (ref 74–109)
HCT VFR BLD CALC: 38.9 % (ref 37–47)
HEMOGLOBIN: 12.9 G/DL (ref 12–16)
LYMPHOCYTES ABSOLUTE: 2.8 K/UL (ref 1.1–4.5)
LYMPHOCYTES RELATIVE PERCENT: 33.5 % (ref 20–40)
MCH RBC QN AUTO: 31.9 PG (ref 27–31)
MCHC RBC AUTO-ENTMCNC: 33.2 G/DL (ref 33–37)
MCV RBC AUTO: 96 FL (ref 81–99)
MONOCYTES ABSOLUTE: 0.7 K/UL (ref 0–0.9)
MONOCYTES RELATIVE PERCENT: 8.8 % (ref 0–10)
NEUTROPHILS ABSOLUTE: 4.6 K/UL (ref 1.5–7.5)
NEUTROPHILS RELATIVE PERCENT: 54.4 % (ref 50–65)
PDW BLD-RTO: 14.3 % (ref 11.5–14.5)
PLATELET # BLD: 206 K/UL (ref 130–400)
PMV BLD AUTO: 10.1 FL (ref 9.4–12.3)
POTASSIUM REFLEX MAGNESIUM: 3.9 MMOL/L (ref 3.5–5)
RBC # BLD: 4.05 M/UL (ref 4.2–5.4)
SODIUM BLD-SCNC: 137 MMOL/L (ref 136–145)
WBC # BLD: 8.4 K/UL (ref 4.8–10.8)

## 2019-01-24 PROCEDURE — 33286 RMVL SUBQ CAR RHYTHM MNTR: CPT | Performed by: INTERNAL MEDICINE

## 2019-01-24 PROCEDURE — 93005 ELECTROCARDIOGRAM TRACING: CPT

## 2019-01-24 PROCEDURE — 71045 X-RAY EXAM CHEST 1 VIEW: CPT

## 2019-01-24 PROCEDURE — G0378 HOSPITAL OBSERVATION PER HR: HCPCS

## 2019-01-24 PROCEDURE — 99152 MOD SED SAME PHYS/QHP 5/>YRS: CPT | Performed by: INTERNAL MEDICINE

## 2019-01-24 PROCEDURE — 33208 INSRT HEART PM ATRIAL & VENT: CPT | Performed by: INTERNAL MEDICINE

## 2019-01-24 PROCEDURE — 2500000003 HC RX 250 WO HCPCS

## 2019-01-24 PROCEDURE — 6370000000 HC RX 637 (ALT 250 FOR IP): Performed by: INTERNAL MEDICINE

## 2019-01-24 PROCEDURE — 6360000002 HC RX W HCPCS

## 2019-01-24 PROCEDURE — 80048 BASIC METABOLIC PNL TOTAL CA: CPT

## 2019-01-24 PROCEDURE — 85025 COMPLETE CBC W/AUTO DIFF WBC: CPT

## 2019-01-24 PROCEDURE — C1898 LEAD, PMKR, OTHER THAN TRANS: HCPCS

## 2019-01-24 PROCEDURE — C1785 PMKR, DUAL, RATE-RESP: HCPCS

## 2019-01-24 PROCEDURE — 2580000003 HC RX 258: Performed by: INTERNAL MEDICINE

## 2019-01-24 PROCEDURE — 99153 MOD SED SAME PHYS/QHP EA: CPT | Performed by: INTERNAL MEDICINE

## 2019-01-24 PROCEDURE — C1894 INTRO/SHEATH, NON-LASER: HCPCS

## 2019-01-24 PROCEDURE — C1781 MESH (IMPLANTABLE): HCPCS

## 2019-01-24 PROCEDURE — 71046 X-RAY EXAM CHEST 2 VIEWS: CPT

## 2019-01-24 PROCEDURE — 36415 COLL VENOUS BLD VENIPUNCTURE: CPT

## 2019-01-24 PROCEDURE — 2709999900 HC NON-CHARGEABLE SUPPLY

## 2019-01-24 PROCEDURE — 33286 RMVL SUBQ CAR RHYTHM MNTR: CPT

## 2019-01-24 RX ORDER — ASPIRIN 81 MG/1
81 TABLET ORAL DAILY
Status: DISCONTINUED | OUTPATIENT
Start: 2019-01-24 | End: 2019-01-25 | Stop reason: HOSPADM

## 2019-01-24 RX ORDER — ONDANSETRON 2 MG/ML
4 INJECTION INTRAMUSCULAR; INTRAVENOUS EVERY 6 HOURS PRN
Status: DISCONTINUED | OUTPATIENT
Start: 2019-01-24 | End: 2019-01-25 | Stop reason: HOSPADM

## 2019-01-24 RX ORDER — SODIUM CHLORIDE 0.9 % (FLUSH) 0.9 %
10 SYRINGE (ML) INJECTION PRN
Status: DISCONTINUED | OUTPATIENT
Start: 2019-01-24 | End: 2019-01-25 | Stop reason: HOSPADM

## 2019-01-24 RX ORDER — GABAPENTIN 300 MG/1
300 CAPSULE ORAL 3 TIMES DAILY
Status: DISCONTINUED | OUTPATIENT
Start: 2019-01-24 | End: 2019-01-25 | Stop reason: HOSPADM

## 2019-01-24 RX ORDER — CHLORHEXIDINE GLUCONATE 4 G/100ML
SOLUTION TOPICAL ONCE
Status: DISCONTINUED | OUTPATIENT
Start: 2019-01-24 | End: 2019-01-25 | Stop reason: HOSPADM

## 2019-01-24 RX ORDER — HYDROXYZINE PAMOATE 25 MG/1
25 CAPSULE ORAL 3 TIMES DAILY PRN
Status: DISCONTINUED | OUTPATIENT
Start: 2019-01-24 | End: 2019-01-25 | Stop reason: HOSPADM

## 2019-01-24 RX ORDER — ONDANSETRON 4 MG/1
4 TABLET, FILM COATED ORAL DAILY PRN
Status: DISCONTINUED | OUTPATIENT
Start: 2019-01-24 | End: 2019-01-25 | Stop reason: HOSPADM

## 2019-01-24 RX ORDER — OLANZAPINE 5 MG/1
5 TABLET ORAL NIGHTLY
Status: DISCONTINUED | OUTPATIENT
Start: 2019-01-24 | End: 2019-01-25 | Stop reason: HOSPADM

## 2019-01-24 RX ORDER — VANCOMYCIN HYDROCHLORIDE 1 G/200ML
1000 INJECTION, SOLUTION INTRAVENOUS
Status: DISCONTINUED | OUTPATIENT
Start: 2019-01-24 | End: 2019-01-24 | Stop reason: ALTCHOICE

## 2019-01-24 RX ORDER — FLUOXETINE HYDROCHLORIDE 20 MG/1
20 CAPSULE ORAL DAILY
Status: DISCONTINUED | OUTPATIENT
Start: 2019-01-24 | End: 2019-01-25 | Stop reason: HOSPADM

## 2019-01-24 RX ORDER — ACETAMINOPHEN 325 MG/1
650 TABLET ORAL EVERY 4 HOURS PRN
Status: DISCONTINUED | OUTPATIENT
Start: 2019-01-24 | End: 2019-01-25 | Stop reason: HOSPADM

## 2019-01-24 RX ORDER — PRAVASTATIN SODIUM 20 MG
40 TABLET ORAL NIGHTLY
Status: DISCONTINUED | OUTPATIENT
Start: 2019-01-24 | End: 2019-01-25 | Stop reason: HOSPADM

## 2019-01-24 RX ORDER — FLUTICASONE PROPIONATE 50 MCG
1 SPRAY, SUSPENSION (ML) NASAL DAILY
Status: DISCONTINUED | OUTPATIENT
Start: 2019-01-24 | End: 2019-01-25 | Stop reason: HOSPADM

## 2019-01-24 RX ORDER — SODIUM CHLORIDE 0.9 % (FLUSH) 0.9 %
10 SYRINGE (ML) INJECTION EVERY 12 HOURS SCHEDULED
Status: DISCONTINUED | OUTPATIENT
Start: 2019-01-24 | End: 2019-01-25 | Stop reason: HOSPADM

## 2019-01-24 RX ORDER — LISINOPRIL 10 MG/1
10 TABLET ORAL DAILY
Status: DISCONTINUED | OUTPATIENT
Start: 2019-01-24 | End: 2019-01-25 | Stop reason: HOSPADM

## 2019-01-24 RX ORDER — SODIUM CHLORIDE 9 MG/ML
INJECTION, SOLUTION INTRAVENOUS CONTINUOUS
Status: DISCONTINUED | OUTPATIENT
Start: 2019-01-24 | End: 2019-01-25 | Stop reason: HOSPADM

## 2019-01-24 RX ORDER — VANCOMYCIN HYDROCHLORIDE 1 G/200ML
1000 INJECTION, SOLUTION INTRAVENOUS EVERY 12 HOURS
Status: COMPLETED | OUTPATIENT
Start: 2019-01-25 | End: 2019-01-25

## 2019-01-24 RX ADMIN — HYDROXYZINE PAMOATE 25 MG: 25 CAPSULE ORAL at 21:48

## 2019-01-24 RX ADMIN — SODIUM CHLORIDE: 9 INJECTION, SOLUTION INTRAVENOUS at 21:51

## 2019-01-24 RX ADMIN — PRAVASTATIN SODIUM 40 MG: 20 TABLET ORAL at 21:48

## 2019-01-24 RX ADMIN — GABAPENTIN 300 MG: 300 CAPSULE ORAL at 21:48

## 2019-01-24 RX ADMIN — FLUOXETINE 20 MG: 20 CAPSULE ORAL at 21:51

## 2019-01-24 RX ADMIN — LISINOPRIL 10 MG: 10 TABLET ORAL at 21:48

## 2019-01-24 RX ADMIN — SODIUM CHLORIDE: 9 INJECTION, SOLUTION INTRAVENOUS at 12:57

## 2019-01-24 RX ADMIN — OLANZAPINE 5 MG: 5 TABLET, FILM COATED ORAL at 21:48

## 2019-01-24 RX ADMIN — FLUTICASONE PROPIONATE 1 SPRAY: 50 SPRAY, METERED NASAL at 21:48

## 2019-01-24 RX ADMIN — ASPIRIN 81 MG: 81 TABLET, COATED ORAL at 21:48

## 2019-01-24 ASSESSMENT — PAIN SCALES - GENERAL
PAINLEVEL_OUTOF10: 0
PAINLEVEL_OUTOF10: 0

## 2019-01-25 VITALS
HEIGHT: 64 IN | TEMPERATURE: 98.2 F | WEIGHT: 169.4 LBS | RESPIRATION RATE: 16 BRPM | BODY MASS INDEX: 28.92 KG/M2 | HEART RATE: 90 BPM | SYSTOLIC BLOOD PRESSURE: 104 MMHG | OXYGEN SATURATION: 95 % | DIASTOLIC BLOOD PRESSURE: 68 MMHG

## 2019-01-25 PROCEDURE — 99024 POSTOP FOLLOW-UP VISIT: CPT | Performed by: INTERNAL MEDICINE

## 2019-01-25 PROCEDURE — 6370000000 HC RX 637 (ALT 250 FOR IP): Performed by: INTERNAL MEDICINE

## 2019-01-25 PROCEDURE — G0378 HOSPITAL OBSERVATION PER HR: HCPCS

## 2019-01-25 PROCEDURE — 2580000003 HC RX 258: Performed by: INTERNAL MEDICINE

## 2019-01-25 PROCEDURE — 6360000002 HC RX W HCPCS: Performed by: INTERNAL MEDICINE

## 2019-01-25 RX ADMIN — Medication 10 ML: at 08:23

## 2019-01-25 RX ADMIN — GABAPENTIN 300 MG: 300 CAPSULE ORAL at 13:07

## 2019-01-25 RX ADMIN — GABAPENTIN 300 MG: 300 CAPSULE ORAL at 08:24

## 2019-01-25 RX ADMIN — ASPIRIN 81 MG: 81 TABLET, COATED ORAL at 08:23

## 2019-01-25 RX ADMIN — LISINOPRIL 10 MG: 10 TABLET ORAL at 08:23

## 2019-01-25 RX ADMIN — VANCOMYCIN HYDROCHLORIDE 1000 MG: 1 INJECTION, SOLUTION INTRAVENOUS at 04:57

## 2019-01-25 RX ADMIN — FLUOXETINE 20 MG: 20 CAPSULE ORAL at 08:23

## 2019-01-25 RX ADMIN — FLUTICASONE PROPIONATE 1 SPRAY: 50 SPRAY, METERED NASAL at 08:24

## 2019-01-29 PROBLEM — Z95.0 PACEMAKER: Status: ACTIVE | Noted: 2019-01-24

## 2019-02-04 ENCOUNTER — NURSE ONLY (OUTPATIENT)
Dept: CARDIOLOGY | Age: 54
End: 2019-02-04

## 2019-02-04 DIAGNOSIS — Z95.818 STATUS POST PLACEMENT OF IMPLANTABLE LOOP RECORDER: ICD-10-CM

## 2019-02-04 DIAGNOSIS — Z51.89 VISIT FOR WOUND CHECK: Primary | ICD-10-CM

## 2019-02-04 PROCEDURE — 99024 POSTOP FOLLOW-UP VISIT: CPT | Performed by: NURSE PRACTITIONER

## 2019-02-07 ENCOUNTER — TELEPHONE (OUTPATIENT)
Dept: CARDIOLOGY | Age: 54
End: 2019-02-07

## 2019-02-11 ENCOUNTER — TELEPHONE (OUTPATIENT)
Dept: CARDIOLOGY | Age: 54
End: 2019-02-11

## 2019-02-13 ENCOUNTER — OFFICE VISIT (OUTPATIENT)
Dept: NEUROLOGY | Age: 54
End: 2019-02-13
Payer: MEDICAID

## 2019-02-13 VITALS
BODY MASS INDEX: 27.66 KG/M2 | HEIGHT: 64 IN | DIASTOLIC BLOOD PRESSURE: 75 MMHG | HEART RATE: 86 BPM | WEIGHT: 162 LBS | SYSTOLIC BLOOD PRESSURE: 105 MMHG

## 2019-02-13 DIAGNOSIS — R26.9 GAIT ABNORMALITY: ICD-10-CM

## 2019-02-13 DIAGNOSIS — R41.3 MEMORY LOSS: ICD-10-CM

## 2019-02-13 DIAGNOSIS — R90.89 ABNORMAL FINDING ON MRI OF BRAIN: ICD-10-CM

## 2019-02-13 DIAGNOSIS — I69.391 DYSPHAGIA DUE TO RECENT CEREBRAL INFARCTION: ICD-10-CM

## 2019-02-13 DIAGNOSIS — I63.9 CEREBROVASCULAR ACCIDENT (CVA) DETERMINED BY CLINICAL ASSESSMENT (HCC): Primary | ICD-10-CM

## 2019-02-13 DIAGNOSIS — R53.1 WEAKNESS: ICD-10-CM

## 2019-02-13 PROCEDURE — 99214 OFFICE O/P EST MOD 30 MIN: CPT | Performed by: PSYCHIATRY & NEUROLOGY

## 2019-02-13 RX ORDER — LISINOPRIL AND HYDROCHLOROTHIAZIDE 25; 20 MG/1; MG/1
1 TABLET ORAL DAILY
Status: ON HOLD | COMMUNITY
End: 2020-04-27 | Stop reason: HOSPADM

## 2019-02-14 ENCOUNTER — TELEPHONE (OUTPATIENT)
Dept: CARDIOLOGY | Age: 54
End: 2019-02-14

## 2019-03-08 ENCOUNTER — OFFICE VISIT (OUTPATIENT)
Dept: CARDIOLOGY | Age: 54
End: 2019-03-08
Payer: MEDICAID

## 2019-03-08 VITALS
SYSTOLIC BLOOD PRESSURE: 118 MMHG | BODY MASS INDEX: 29.37 KG/M2 | HEART RATE: 82 BPM | WEIGHT: 172 LBS | HEIGHT: 64 IN | DIASTOLIC BLOOD PRESSURE: 74 MMHG

## 2019-03-08 DIAGNOSIS — I44.30 HEART BLOCK, AV: Primary | ICD-10-CM

## 2019-03-08 DIAGNOSIS — Z95.818 STATUS POST PLACEMENT OF IMPLANTABLE LOOP RECORDER: ICD-10-CM

## 2019-03-08 PROCEDURE — 99213 OFFICE O/P EST LOW 20 MIN: CPT | Performed by: CLINICAL NURSE SPECIALIST

## 2019-03-08 PROCEDURE — 93288 INTERROG EVL PM/LDLS PM IP: CPT | Performed by: CLINICAL NURSE SPECIALIST

## 2019-04-08 ENCOUNTER — OFFICE VISIT (OUTPATIENT)
Dept: CARDIOLOGY | Age: 54
End: 2019-04-08
Payer: MEDICAID

## 2019-04-08 VITALS
SYSTOLIC BLOOD PRESSURE: 104 MMHG | WEIGHT: 156 LBS | BODY MASS INDEX: 28.71 KG/M2 | HEIGHT: 62 IN | DIASTOLIC BLOOD PRESSURE: 86 MMHG | HEART RATE: 88 BPM

## 2019-04-08 DIAGNOSIS — I63.40 CEREBROVASCULAR ACCIDENT (CVA) DUE TO EMBOLISM OF CEREBRAL ARTERY (HCC): ICD-10-CM

## 2019-04-08 DIAGNOSIS — I49.5 SINOATRIAL NODE DYSFUNCTION (HCC): Primary | ICD-10-CM

## 2019-04-08 DIAGNOSIS — Z95.0 PACEMAKER: ICD-10-CM

## 2019-04-08 DIAGNOSIS — I10 ESSENTIAL HYPERTENSION: Chronic | ICD-10-CM

## 2019-04-08 PROCEDURE — 99213 OFFICE O/P EST LOW 20 MIN: CPT | Performed by: CLINICAL NURSE SPECIALIST

## 2019-04-08 NOTE — PROGRESS NOTES
[facility], [address] [place] [address]  Phone: [phone]  Fax: [phone]    OFFICE VISIT:  2019    Guru Stout - : 1965    Reason For Visit:  Emily Patient is a 48 y.o. female who is here for follow-up for pacemaker concerns    HPI  Patient is here for follow-up of history of sinoatrial node dysfunction, CVA, hypertension, pacemaker. Her pacemaker was implanted   On 19. She has a variety of complaints today. She feels like her pacemaker occasionally \"shocks\" her. She feels when she walks in front of a microwave or other electromagnetic field that she experiences a \"tingling\" sensation. She is concerned about movement of her left arm and how much she can do. JOHN Schwab is PCP.   Guru Stout has the following history as recorded in [facility]:    Patient Active Problem List    Diagnosis Date Noted    Sinoatrial node dysfunction ([address] 2019     Priority: High    Pacemaker 2019    Bradycardia 2019    Sinus pause 2019    Adhesive capsulitis of left shoulder 2018    Mixed hyperlipidemia 2018    Abnormal finding on MRI of brain 2018    Weakness 2018    Memory loss 2018    Dysphagia due to recent cerebral infarction 2018    Status post placement of implantable loop recorder 2018    Cryptogenic stroke [facility])     Cerebrovascular accident (CVA) determined by clinical assessment ([address]     Lesion of basal ganglia     Hemiplegia affecting non-dominant side, post-stroke 2018    Gait abnormality 2018    Ataxia 2018    Numbness 2018    Dysarthria 2018    Cerebrovascular accident (CVA) ([address] 2018    Essential hypertension 2018    Major depressive disorder, recurrent, severe without psychotic features ([address] 2016    Suicidal ideation      Past Medical History:   Diagnosis Date    Anxiety     Arthritis     [facility] 12/2017    X 2 WITH LOOP RECORDER PUT IN 2018    Depression     Ganglia     Hypertension     Memory loss     Mixed hyperlipidemia 3/13/2018    Pacemaker 2019    Status post placement of implantable loop recorder 2018     Past Surgical History:   Procedure Laterality Date     SECTION      x2    CHOLECYSTECTOMY      COSMETIC SURGERY      HAND SURGERY      INSERTABLE CARDIAC MONITOR  2018    OR MANIPULATN SHLDR JT W ANESTHESIA Left 12/3/2018    SHOULDER MANIPULATION UNDER ANESTHESIA WITH CORTICOSTEROID INJECTION performed by Jennifer Bourgeois MD at [facility] History   Problem Relation Age of Onset    Mental Illness Mother     Diabetes Mother     Cancer Mother     Mental Illness Father      Social History     Tobacco Use    Smoking status: Current Every Day Smoker     Packs/day: 0.50     Years: 35.00     Pack years: 17.50     Types: Cigarettes    Smokeless tobacco: Never Used   Substance Use Topics    Alcohol use: Yes     Comment: occ. Current Outpatient Medications   Medication Sig Dispense Refill    lisinopril-hydrochlorothiazide (PRINZIDE;ZESTORETIC) 20-25 MG per tablet Take 1 tablet by mouth daily      ondansetron (ZOFRAN) 4 MG tablet Take 1 tablet by mouth daily as needed for Nausea or Vomiting 20 tablet 0    gabapentin (NEURONTIN) 300 MG capsule Take 300 mg by mouth 3 times daily. Ardyth Chrissy aspirin 81 MG EC tablet Take 1 tablet by mouth daily 30 tablet 0    pravastatin (PRAVACHOL) 40 MG tablet Take 1 tablet by mouth nightly 30 tablet 0    OLANZapine (ZYPREXA) 5 MG tablet Take 1 tablet by mouth nightly 30 tablet 0    hydrOXYzine (VISTARIL) 25 MG capsule Take 25 mg by mouth 3 times daily as needed for Itching      FLUoxetine (PROZAC) 20 MG capsule Take 1 capsule by mouth daily 30 capsule 0    fluticasone (FLONASE) 50 MCG/ACT nasal spray 1 spray by Nasal route daily       No current facility-administered medications for this visit. Allergies: Penicillins    Review of Systems  Review of Systems   Constitutional: Negative for activity change, diaphoresis, fatigue, fever and unexpected weight change. HENT: Negative for facial swelling and nosebleeds. Eyes: Negative for redness and visual disturbance. Respiratory: Negative for cough, chest tightness, shortness of breath and wheezing. Cardiovascular: Negative for chest pain, palpitations and leg swelling. Gastrointestinal: Negative for abdominal pain, nausea and vomiting. Endocrine: Negative for cold intolerance and heat intolerance. Genitourinary: Negative for dysuria and hematuria. Musculoskeletal: Negative for arthralgias and myalgias. Skin: Negative for pallor and rash. Neurological: Positive for weakness (left arm related to past CVA). Negative for dizziness, seizures, syncope and light-headedness. Hematological: Does not bruise/bleed easily. Psychiatric/Behavioral: Negative for agitation. The patient is not nervous/anxious. Objective  Vital Signs - /86   Pulse 88   Ht 5' 2\" (1.575 m)   Wt 156 lb (70.8 kg)   LMP  (LMP Unknown)   BMI 28.53 kg/m²   Physical Exam   Constitutional: She is oriented to person, place, and time. She appears well-developed and well-nourished. HENT:   Head: Normocephalic and atraumatic. Right Ear: Hearing and external ear normal.   Left Ear: Hearing and external ear normal.   Nose: Nose normal.   Eyes: Pupils are equal, round, and reactive to light. Right eye exhibits no discharge. Left eye exhibits no discharge. Neck: No JVD present. No tracheal deviation present. No thyromegaly present. Cardiovascular: Normal rate, regular rhythm, normal heart sounds and intact distal pulses. Exam reveals no gallop and no friction rub. No murmur heard. No carotid bruit   Pulmonary/Chest: Effort normal and breath sounds normal. No respiratory distress. She has no wheezes. She has no rales. Abdominal: Soft.  There is no

## 2019-04-08 NOTE — PATIENT INSTRUCTIONS
Return for as scheduled. Send Carelink in June as scheduled    Call with any questionsor concerns  Follow up with JOHN aHmm for non cardiac problems  Report any new problems  Cardiovascular Fitness-Exercise as tolerated. Strive for 15 minutes of exercise most days of the week. Cardiac / HealthyDiet  Continue current medications as directed  Continue plan of treatment  It is always recommended that you bring your medicationsbottles with you to each visit - this is for your safety!

## 2019-04-09 ASSESSMENT — ENCOUNTER SYMPTOMS
NAUSEA: 0
COUGH: 0
EYE REDNESS: 0
WHEEZING: 0
ABDOMINAL PAIN: 0
SHORTNESS OF BREATH: 0
CHEST TIGHTNESS: 0
FACIAL SWELLING: 0
VOMITING: 0

## 2019-05-09 ENCOUNTER — HOSPITAL ENCOUNTER (INPATIENT)
Age: 54
LOS: 4 days | Discharge: HOME | DRG: 885 | End: 2019-05-13
Attending: EMERGENCY MEDICINE | Admitting: PSYCHIATRY & NEUROLOGY
Payer: MEDICAID

## 2019-05-09 DIAGNOSIS — F32.A DEPRESSION WITH SUICIDAL IDEATION: Primary | ICD-10-CM

## 2019-05-09 DIAGNOSIS — R45.851 DEPRESSION WITH SUICIDAL IDEATION: Primary | ICD-10-CM

## 2019-05-09 LAB
ALBUMIN SERPL-MCNC: 4.4 G/DL (ref 3.5–5.2)
ALP BLD-CCNC: 67 U/L (ref 35–104)
ALT SERPL-CCNC: 14 U/L (ref 5–33)
AMPHETAMINE SCREEN, URINE: NEGATIVE
ANION GAP SERPL CALCULATED.3IONS-SCNC: 12 MMOL/L (ref 7–19)
AST SERPL-CCNC: 12 U/L (ref 5–32)
BARBITURATE SCREEN URINE: NEGATIVE
BASOPHILS ABSOLUTE: 0 K/UL (ref 0–0.2)
BASOPHILS RELATIVE PERCENT: 0.4 % (ref 0–1)
BENZODIAZEPINE SCREEN, URINE: NEGATIVE
BILIRUB SERPL-MCNC: 0.3 MG/DL (ref 0.2–1.2)
BILIRUBIN URINE: NEGATIVE
BLOOD, URINE: NEGATIVE
BUN BLDV-MCNC: 11 MG/DL (ref 6–20)
CALCIUM SERPL-MCNC: 9.5 MG/DL (ref 8.6–10)
CANNABINOID SCREEN URINE: NEGATIVE
CHLORIDE BLD-SCNC: 107 MMOL/L (ref 98–111)
CLARITY: CLEAR
CO2: 26 MMOL/L (ref 22–29)
COCAINE METABOLITE SCREEN URINE: NEGATIVE
COLOR: YELLOW
CREAT SERPL-MCNC: 0.6 MG/DL (ref 0.5–0.9)
EOSINOPHILS ABSOLUTE: 0.2 K/UL (ref 0–0.6)
EOSINOPHILS RELATIVE PERCENT: 4.1 % (ref 0–5)
ETHANOL: <10 MG/DL (ref 0–0.08)
GFR NON-AFRICAN AMERICAN: >60
GLUCOSE BLD-MCNC: 94 MG/DL (ref 74–109)
GLUCOSE URINE: NEGATIVE MG/DL
HCT VFR BLD CALC: 39.5 % (ref 37–47)
HEMOGLOBIN: 13.3 G/DL (ref 12–16)
KETONES, URINE: NEGATIVE MG/DL
LEUKOCYTE ESTERASE, URINE: NEGATIVE
LYMPHOCYTES ABSOLUTE: 2.1 K/UL (ref 1.1–4.5)
LYMPHOCYTES RELATIVE PERCENT: 38.2 % (ref 20–40)
Lab: NORMAL
MCH RBC QN AUTO: 33.2 PG (ref 27–31)
MCHC RBC AUTO-ENTMCNC: 33.7 G/DL (ref 33–37)
MCV RBC AUTO: 98.5 FL (ref 81–99)
MONOCYTES ABSOLUTE: 0.5 K/UL (ref 0–0.9)
MONOCYTES RELATIVE PERCENT: 9.5 % (ref 0–10)
NEUTROPHILS ABSOLUTE: 2.6 K/UL (ref 1.5–7.5)
NEUTROPHILS RELATIVE PERCENT: 47.4 % (ref 50–65)
NITRITE, URINE: NEGATIVE
OPIATE SCREEN URINE: NEGATIVE
PDW BLD-RTO: 14.1 % (ref 11.5–14.5)
PH UA: 6 (ref 5–8)
PLATELET # BLD: 183 K/UL (ref 130–400)
PMV BLD AUTO: 9.9 FL (ref 9.4–12.3)
POTASSIUM SERPL-SCNC: 3.8 MMOL/L (ref 3.5–5)
PROTEIN UA: NEGATIVE MG/DL
RBC # BLD: 4.01 M/UL (ref 4.2–5.4)
SODIUM BLD-SCNC: 145 MMOL/L (ref 136–145)
SPECIFIC GRAVITY UA: 1.02 (ref 1–1.03)
TOTAL PROTEIN: 7.2 G/DL (ref 6.6–8.7)
URINE REFLEX TO CULTURE: NORMAL
UROBILINOGEN, URINE: 0.2 E.U./DL
WBC # BLD: 5.4 K/UL (ref 4.8–10.8)

## 2019-05-09 PROCEDURE — 80053 COMPREHEN METABOLIC PANEL: CPT

## 2019-05-09 PROCEDURE — 81003 URINALYSIS AUTO W/O SCOPE: CPT

## 2019-05-09 PROCEDURE — 36415 COLL VENOUS BLD VENIPUNCTURE: CPT

## 2019-05-09 PROCEDURE — 1240000000 HC EMOTIONAL WELLNESS R&B

## 2019-05-09 PROCEDURE — G0480 DRUG TEST DEF 1-7 CLASSES: HCPCS

## 2019-05-09 PROCEDURE — 6370000000 HC RX 637 (ALT 250 FOR IP): Performed by: PSYCHIATRY & NEUROLOGY

## 2019-05-09 PROCEDURE — 99285 EMERGENCY DEPT VISIT HI MDM: CPT | Performed by: EMERGENCY MEDICINE

## 2019-05-09 PROCEDURE — 85025 COMPLETE CBC W/AUTO DIFF WBC: CPT

## 2019-05-09 PROCEDURE — 80307 DRUG TEST PRSMV CHEM ANLYZR: CPT

## 2019-05-09 PROCEDURE — 99285 EMERGENCY DEPT VISIT HI MDM: CPT

## 2019-05-09 RX ORDER — LANOLIN ALCOHOL/MO/W.PET/CERES
3 CREAM (GRAM) TOPICAL NIGHTLY PRN
Status: DISCONTINUED | OUTPATIENT
Start: 2019-05-09 | End: 2019-05-10

## 2019-05-09 RX ORDER — ACETAMINOPHEN 325 MG/1
650 TABLET ORAL EVERY 4 HOURS PRN
Status: DISCONTINUED | OUTPATIENT
Start: 2019-05-09 | End: 2019-05-13 | Stop reason: HOSPADM

## 2019-05-09 RX ORDER — TRAZODONE HYDROCHLORIDE 50 MG/1
50 TABLET ORAL NIGHTLY
Status: DISCONTINUED | OUTPATIENT
Start: 2019-05-09 | End: 2019-05-13 | Stop reason: HOSPADM

## 2019-05-09 RX ADMIN — Medication 3 MG: at 22:58

## 2019-05-09 RX ADMIN — TRAZODONE HYDROCHLORIDE 50 MG: 50 TABLET ORAL at 22:58

## 2019-05-09 ASSESSMENT — SLEEP AND FATIGUE QUESTIONNAIRES
RESTFUL SLEEP: NO
AVERAGE NUMBER OF SLEEP HOURS: 5
DIFFICULTY FALLING ASLEEP: YES
DIFFICULTY STAYING ASLEEP: YES
DO YOU HAVE DIFFICULTY SLEEPING: YES
DO YOU USE A SLEEP AID: YES
DIFFICULTY ARISING: NO

## 2019-05-09 ASSESSMENT — ENCOUNTER SYMPTOMS
ABDOMINAL PAIN: 0
SHORTNESS OF BREATH: 0

## 2019-05-09 ASSESSMENT — PATIENT HEALTH QUESTIONNAIRE - PHQ9: SUM OF ALL RESPONSES TO PHQ QUESTIONS 1-9: 25

## 2019-05-09 ASSESSMENT — LIFESTYLE VARIABLES: HISTORY_ALCOHOL_USE: NO

## 2019-05-09 ASSESSMENT — PAIN SCALES - GENERAL: PAINLEVEL_OUTOF10: 0

## 2019-05-09 NOTE — ED PROVIDER NOTES
[facility]  eMERGENCY dEPARTMENT eNCOUnter      Pt Name: Kelvin Fox  MRN: [id number]  [place] 1965  Date of evaluation: 5/9/2019  Provider: Jose Manuel Canales MD    CHIEF COMPLAINT       Chief Complaint   Patient presents with    Mental Health Problem     SI with plan to OD         HISTORY OF PRESENT ILLNESS   (Location/Symptom, Timing/Onset,Context/Setting, Quality, Duration, Modifying Factors, Severity)  Note limiting factors. Kelvin Fox is a 48 y.o. female who presents to the emergency department with complaints of depression and thoughts about wanting to harm herself. Patient reports that she has been overwhelmed by relationship challenges with her . Reports that she's been having some thoughts of wanting to harm her self. She presented to [address] [place] [address] this morning and was sent here to the ED for evaluation and inpatient hospitalization. Patient denies any recent illnesses, fever, chills, vomiting or diarrhea. Reports that she has a specific plan to harm herself to run out) of a vehicle or perhaps take a medication overdose with pills. She does report a previous history of suicidal thought several years ago, in review of her records it appears her last inpatient hospitalization on behavioral health was back in 2016. HPI    NursingNotes were reviewed. REVIEW OF SYSTEMS    (2-9 systems for level 4, 10 or more for level 5)     Review of Systems   Constitutional: Negative for chills and fever. Respiratory: Negative for shortness of breath. Cardiovascular: Negative for chest pain. Gastrointestinal: Negative for abdominal pain. Psychiatric/Behavioral: Positive for sleep disturbance and suicidal ideas. All other systems reviewed and are negative.            PAST MEDICALHISTORY     Past Medical History:   Diagnosis Date    Anxiety     Arthritis     Cryptogenic [facility] 12/2017    X 2 WITH LOOP RECORDER PUT IN JANUARY 2018    Depression     file   [employer] needs:     Medical: Not on file     Non-medical: Not on file   Tobacco Use    Smoking status: Current Every Day Smoker     Packs/day: 0.50     Years: 35.00     Pack years: 17.50     Types: Cigarettes    Smokeless tobacco: Never Used   Substance and Sexual Activity    Alcohol use: Yes     Comment: occ.  Drug use: No    Sexual activity: Not on file   Lifestyle    Physical activity:     Days per week: Not on file     Minutes per session: Not on file    Stress: Not on file   Relationships    Social connections:     Talks on phone: Not on file     Gets together: Not on file     Attends [employer] service: Not on file     Active member of club or organization: Not on file     Attends meetings of clubs or organizations: Not on file     Relationship status: Not on file    Intimate partner violence:     Fear of current or ex partner: Not on file     Emotionally abused: Not on file     Physically abused: Not on file     Forced sexual activity: Not on file   Other Topics Concern    Not on file   Social History Narrative    Not on file       SCREENINGS             PHYSICAL EXAM    (up to 7 for level 4, 8 or more for level 5)     ED Triage Vitals [05/09/19 1124]   BP Temp Temp Source Pulse Resp SpO2 Height Weight   (!) 143/103 97.6 °F (36.4 °C) Oral 86 16 97 % 5' 4\" (1.626 m) 160 lb (72.6 kg)       Physical Exam   Constitutional: She is oriented to person, place, and time. No distress. HENT:   Head: Atraumatic. Mouth/Throat: Oropharynx is clear and moist.   Cardiovascular: Normal rate, regular rhythm and normal heart sounds. Pulmonary/Chest: Effort normal and breath sounds normal. No respiratory distress. Abdominal: She exhibits no distension. There is no tenderness. Neurological: She is alert and oriented to person, place, and time. Skin: Capillary refill takes less than 2 seconds. Nursing note and vitals reviewed.       DIAGNOSTIC RESULTS         LABS:  Labs Reviewed   CBC WITH AUTO DIFFERENTIAL - Abnormal; Notable for the following components:       Result Value    RBC 4.01 (*)     MCH 33.2 (*)     Neutrophils % 47.4 (*)     All other components within normal limits   COMPREHENSIVE METABOLIC PANEL   ETHANOL   URINE RT REFLEX TO CULTURE   URINE DRUG SCREEN       All other labs were within normal range or not returned as of this dictation. EMERGENCY DEPARTMENT COURSE and DIFFERENTIAL DIAGNOSIS/MDM:   Vitals:    Vitals:    05/09/19 1124   BP: (!) 143/103   Pulse: 86   Resp: 16   Temp: 97.6 °F (36.4 °C)   TempSrc: Oral   SpO2: 97%   Weight: 160 lb (72.6 kg)   Height: 5' 4\" (1.626 m)       MDM      CONSULTS:    Patient was seen and evaluated by mental professional and after discussion with on-call psychiatry, Dr. Denis Tapia, patient was accepted for inpatient admission to the geriatric [facility]. This is a voluntary admission. PROCEDURES:  Unless otherwise noted below, none     Procedures    FINAL IMPRESSION      1.  Depression with suicidal ideation          DISPOSITION/PLAN   DISPOSITION Decision To Admit 05/09/2019 02:41:21 PM    (Please note that portions of this note were completed with a voice recognition program.  Efforts were made to edit thedictations but occasionally words are mis-transcribed.)    Jose Gilbert MD (electronically signed)  Attending Emergency Physician         Jose Gilbert MD  05/09/19 8520
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
## 2019-05-09 NOTE — ED NOTES
Pt  Missy White [phone]  Pt does not want spouse to visit, spouse is aware that she is a patient because he was with her at [facility] prior to coming to ED     Gisselle Parnell RN  05/09/19 1554
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
## 2019-05-09 NOTE — PROGRESS NOTES
Pt transported from ED to Adult [facility] room # 621. Arrived on unit via [facility] with . Pt appropriately attired in hospital scrub. Body assessment completed with no contraband discovered. Pt belongings and valuables inventoried and cataloged, stored per policy. Pt oriented to surroundings, program expectations, and copy pt rights given.

## 2019-05-10 PROBLEM — F33.2 MAJOR DEPRESSIVE DISORDER, RECURRENT SEVERE WITHOUT PSYCHOTIC FEATURES (HCC): Status: ACTIVE | Noted: 2019-05-10

## 2019-05-10 LAB
CHOLESTEROL, TOTAL: 201 MG/DL (ref 160–199)
HBA1C MFR BLD: 5.6 % (ref 4–6)
HDLC SERPL-MCNC: 38 MG/DL (ref 65–121)
LDL CHOLESTEROL CALCULATED: 137 MG/DL
TRIGL SERPL-MCNC: 132 MG/DL (ref 0–149)

## 2019-05-10 PROCEDURE — 99223 1ST HOSP IP/OBS HIGH 75: CPT | Performed by: PSYCHIATRY & NEUROLOGY

## 2019-05-10 PROCEDURE — 1240000000 HC EMOTIONAL WELLNESS R&B

## 2019-05-10 PROCEDURE — 83036 HEMOGLOBIN GLYCOSYLATED A1C: CPT

## 2019-05-10 PROCEDURE — 6370000000 HC RX 637 (ALT 250 FOR IP): Performed by: PSYCHIATRY & NEUROLOGY

## 2019-05-10 PROCEDURE — 36415 COLL VENOUS BLD VENIPUNCTURE: CPT

## 2019-05-10 PROCEDURE — 80061 LIPID PANEL: CPT

## 2019-05-10 RX ORDER — LISINOPRIL 20 MG/1
20 TABLET ORAL DAILY
Status: DISCONTINUED | OUTPATIENT
Start: 2019-05-10 | End: 2019-05-13 | Stop reason: HOSPADM

## 2019-05-10 RX ORDER — LANOLIN ALCOHOL/MO/W.PET/CERES
3 CREAM (GRAM) TOPICAL NIGHTLY
Status: DISCONTINUED | OUTPATIENT
Start: 2019-05-10 | End: 2019-05-13 | Stop reason: HOSPADM

## 2019-05-10 RX ORDER — ONDANSETRON 4 MG/1
4 TABLET, FILM COATED ORAL DAILY PRN
Status: DISCONTINUED | OUTPATIENT
Start: 2019-05-10 | End: 2019-05-13 | Stop reason: HOSPADM

## 2019-05-10 RX ORDER — HYDROCHLOROTHIAZIDE 25 MG/1
25 TABLET ORAL DAILY
Status: DISCONTINUED | OUTPATIENT
Start: 2019-05-10 | End: 2019-05-13 | Stop reason: HOSPADM

## 2019-05-10 RX ORDER — GABAPENTIN 300 MG/1
300 CAPSULE ORAL 3 TIMES DAILY
Status: DISCONTINUED | OUTPATIENT
Start: 2019-05-10 | End: 2019-05-13 | Stop reason: HOSPADM

## 2019-05-10 RX ORDER — ASPIRIN 81 MG/1
81 TABLET ORAL DAILY
Status: DISCONTINUED | OUTPATIENT
Start: 2019-05-10 | End: 2019-05-13 | Stop reason: HOSPADM

## 2019-05-10 RX ORDER — PRAVASTATIN SODIUM 20 MG
40 TABLET ORAL NIGHTLY
Status: DISCONTINUED | OUTPATIENT
Start: 2019-05-10 | End: 2019-05-13 | Stop reason: HOSPADM

## 2019-05-10 RX ORDER — HYDROXYZINE PAMOATE 25 MG/1
25 CAPSULE ORAL 3 TIMES DAILY PRN
Status: DISCONTINUED | OUTPATIENT
Start: 2019-05-10 | End: 2019-05-13 | Stop reason: HOSPADM

## 2019-05-10 RX ORDER — LISINOPRIL AND HYDROCHLOROTHIAZIDE 25; 20 MG/1; MG/1
1 TABLET ORAL DAILY
Status: DISCONTINUED | OUTPATIENT
Start: 2019-05-10 | End: 2019-05-10 | Stop reason: CLARIF

## 2019-05-10 RX ORDER — FLUTICASONE PROPIONATE 50 MCG
1 SPRAY, SUSPENSION (ML) NASAL DAILY
Status: DISCONTINUED | OUTPATIENT
Start: 2019-05-10 | End: 2019-05-13 | Stop reason: HOSPADM

## 2019-05-10 RX ORDER — FLUOXETINE HYDROCHLORIDE 20 MG/1
60 CAPSULE ORAL DAILY
Status: DISCONTINUED | OUTPATIENT
Start: 2019-05-10 | End: 2019-05-13 | Stop reason: HOSPADM

## 2019-05-10 RX ORDER — OLANZAPINE 5 MG/1
5 TABLET ORAL NIGHTLY
Status: DISCONTINUED | OUTPATIENT
Start: 2019-05-10 | End: 2019-05-13 | Stop reason: HOSPADM

## 2019-05-10 RX ADMIN — HYDROCHLOROTHIAZIDE 25 MG: 25 TABLET ORAL at 15:41

## 2019-05-10 RX ADMIN — Medication 3 MG: at 20:56

## 2019-05-10 RX ADMIN — PRAVASTATIN SODIUM 40 MG: 20 TABLET ORAL at 20:56

## 2019-05-10 RX ADMIN — LISINOPRIL 20 MG: 20 TABLET ORAL at 15:41

## 2019-05-10 RX ADMIN — OLANZAPINE 5 MG: 5 TABLET, FILM COATED ORAL at 20:56

## 2019-05-10 RX ADMIN — GABAPENTIN 300 MG: 300 CAPSULE ORAL at 20:59

## 2019-05-10 RX ADMIN — FLUOXETINE HYDROCHLORIDE 60 MG: 20 CAPSULE ORAL at 15:40

## 2019-05-10 RX ADMIN — FLUTICASONE PROPIONATE 1 SPRAY: 50 SPRAY, METERED NASAL at 15:44

## 2019-05-10 RX ADMIN — TRAZODONE HYDROCHLORIDE 50 MG: 50 TABLET ORAL at 20:57

## 2019-05-10 RX ADMIN — ASPIRIN 81 MG: 81 TABLET ORAL at 15:42

## 2019-05-10 RX ADMIN — GABAPENTIN 300 MG: 300 CAPSULE ORAL at 15:41

## 2019-05-10 RX ADMIN — HYDROXYZINE PAMOATE 25 MG: 25 CAPSULE ORAL at 15:42

## 2019-05-10 ASSESSMENT — PAIN SCALES - GENERAL: PAINLEVEL_OUTOF10: 0

## 2019-05-10 NOTE — PROGRESS NOTES
SW completed psychosocial and CSSR-S on this date. Pt long and short term goals discussed. Pt voiced understanding. Treatment plan sheet signed. Pt verbalized understanding of the treatment plan. Pt participated in goals and objectives of the treatment plan. It was identified that pt will require outpatient follow up appointments at local [facility] behavioral health facility such as; [facility] in [place]. Pt validated need for appointments. Pt was also provided a handout of contact information for drug and alcohol treatment centers and other community support service such as [facility] and [employer].     In the last 6 months has the pt been danger to self: Yes  In the last 6 months has the pt been danger to others: No     Provided pt with [employer] handout entitled \"Quitting Smoking,\" reviewed handout with pt addressing dangers of smoking, developing coping skills, and providing basic information about quitting. Patient declined practical counseling of tobacco practical counseling during the hospital stay.      Electronically signed by Salma [facility] on 5/10/2019 at 12:18 PM

## 2019-05-10 NOTE — PROGRESS NOTES
RT leisure assessment was complete. Pt will be encouraged to attend scheduled group activities to address leisure and social deficits.

## 2019-05-10 NOTE — PLAN OF CARE
Problem: Depressive Behavior With or Without Suicide Precautions:  Goal: Able to verbalize and/or display a decrease in depressive symptoms  Description  Able to verbalize and/or display a decrease in depressive symptoms  Outcome: Ongoing  Note:                                                                       Group Therapy Note    Date: 5/10/2019  Start Time: 1115  End Time:    Number of Participants: 0    Type of Group: Psychotherapy    Patient's Goal: Pt will attend group as scheduled, identify an issue pt would like to work on regarding bettering relationships with others and/or self, pt will be participate in group discussion. Note: Pt did not attend group as scheduled. Pt was encouraged and invited however, pt still refused. Nursing notified.      Participation Level: None    Discipline Responsible: /Counselor    Signature:  Fariba Oliveira [facility]
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
## 2019-05-10 NOTE — PROGRESS NOTES
BHI Daily Shift Assessment  Nursing Progress Note    Room: 0621/621-01 Name: Mendel Valencia Age: 48 y.o.     Gender: female   Dx: <principal problem not specified>  Precautions: suicide risk and fall risk  Target Symptoms:   Accu-Chek: NoSleep: Yes,Sleep Quality Good SI no plan [facility] Denies [address] [place] [address]  ADLs: No Speech: normal Depression: 5 Anxiety: 2   Participation LevelActive Listener and Interactive  Visitation: No   Participation Alyssa Lee, Attentive and Sharing    Electronically signed by Roberto Woody RN on 5/10/2019 at 11:27 AM

## 2019-05-10 NOTE — PROGRESS NOTES
Admission Note    Reason for admission/Target Symptom: Patient admitted to [facility] due to [facility] with plan to OD\". Diagnoses: Depression with suicidal ideation   UDS: Negative  BAL:  Negative    SW met with treatment team to discuss patient's treatment including care planning, discharge planning, and follow-up needs. Pt has been admitted to [facility]. Treatment team has identified patient's discharge needs as medication management and outpatient therapy/counseling. Pt confirmed  the need for ongoing treatment post inpatient stay. Pt was also provided a handout of contact information for drug and alcohol treatment centers and other community support service such as [facility], AA, and [facility].     Electronically signed by Whitney Morales, [address] on 5/10/2019 at 10:22 AM

## 2019-05-10 NOTE — PLAN OF CARE
Problem: Suicide risk  Description  Suicide risk  Goal: Provide patient with safe environment  Description  Provide patient with safe environment  Outcome: Ongoing     Problem: Falls - Risk of:  Goal: Will remain free from falls  Description  Will remain free from falls  Outcome: Ongoing  Goal: Absence of physical injury  Description  Absence of physical injury  Outcome: Ongoing

## 2019-05-10 NOTE — PLAN OF CARE
Patient has remained free of injury this shift.  Electronically signed by Zia Timmons RN on 5/10/2019 at 9:12 AM
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
## 2019-05-11 LAB
TSH REFLEX FT4: 1.2 UIU/ML (ref 0.35–5.5)
VITAMIN B-12: 383 PG/ML (ref 211–946)
VITAMIN D 25-HYDROXY: 20.1 NG/ML

## 2019-05-11 PROCEDURE — 82306 VITAMIN D 25 HYDROXY: CPT

## 2019-05-11 PROCEDURE — 84443 ASSAY THYROID STIM HORMONE: CPT

## 2019-05-11 PROCEDURE — 6370000000 HC RX 637 (ALT 250 FOR IP): Performed by: NURSE PRACTITIONER

## 2019-05-11 PROCEDURE — 82607 VITAMIN B-12: CPT

## 2019-05-11 PROCEDURE — 6370000000 HC RX 637 (ALT 250 FOR IP): Performed by: PSYCHIATRY & NEUROLOGY

## 2019-05-11 PROCEDURE — 1240000000 HC EMOTIONAL WELLNESS R&B

## 2019-05-11 PROCEDURE — 36415 COLL VENOUS BLD VENIPUNCTURE: CPT

## 2019-05-11 RX ORDER — ERGOCALCIFEROL 1.25 MG/1
50000 CAPSULE ORAL WEEKLY
Status: DISCONTINUED | OUTPATIENT
Start: 2019-05-11 | End: 2019-05-13 | Stop reason: HOSPADM

## 2019-05-11 RX ADMIN — FLUOXETINE HYDROCHLORIDE 60 MG: 20 CAPSULE ORAL at 08:50

## 2019-05-11 RX ADMIN — ERGOCALCIFEROL 50000 UNITS: 1.25 CAPSULE ORAL at 08:52

## 2019-05-11 RX ADMIN — ASPIRIN 81 MG: 81 TABLET ORAL at 08:50

## 2019-05-11 RX ADMIN — FLUTICASONE PROPIONATE 1 SPRAY: 50 SPRAY, METERED NASAL at 08:49

## 2019-05-11 RX ADMIN — GABAPENTIN 300 MG: 300 CAPSULE ORAL at 13:17

## 2019-05-11 RX ADMIN — GABAPENTIN 300 MG: 300 CAPSULE ORAL at 21:22

## 2019-05-11 RX ADMIN — OLANZAPINE 5 MG: 5 TABLET, FILM COATED ORAL at 21:22

## 2019-05-11 RX ADMIN — TRAZODONE HYDROCHLORIDE 50 MG: 50 TABLET ORAL at 21:22

## 2019-05-11 RX ADMIN — PRAVASTATIN SODIUM 40 MG: 20 TABLET ORAL at 21:22

## 2019-05-11 RX ADMIN — GABAPENTIN 300 MG: 300 CAPSULE ORAL at 08:49

## 2019-05-11 RX ADMIN — LISINOPRIL 20 MG: 20 TABLET ORAL at 08:50

## 2019-05-11 RX ADMIN — Medication 3 MG: at 21:22

## 2019-05-11 RX ADMIN — HYDROCHLOROTHIAZIDE 25 MG: 25 TABLET ORAL at 08:50

## 2019-05-11 NOTE — PROGRESS NOTES
BHI Daily Shift Assessment-Geriatric Unit  Nursing Progress Note    Room: [address] [place] [address]-   Name: Nikolay Mariscal   Age: 48 y.o. Gender: female   Dx: <principal problem not specified>  Precautions: suicide risk and fall risk  Inpatient Status: voluntary       Sleep: Yes,   Sleep Quality Fair   Hours Slept:    Sleep Medications: Yes  PRN Sleep Meds: Yes       Other PRN Meds: No   Med Compliant: Yes   Accu-Chek: No   Oxygen: No         SI denies suicidal ideation    HI Negative for homicidal ideation        AVH:Absent      Depression: 0   Anxiety: 0       Appetite: no change from normal    Social: Yes   Speech: normal   Appearance: appropriately dressed  Assistive Devices: walker  Level of Assist: Independent        Group Participation: Yes  Participation LevelActive Listener    Participation QualityAppropriate    Notes: Patient was in the dining area reading the Bible and talking until bed this evening.      Electronically signed by Ramone Phillip RN on 5/10/19 at 10:48 PM

## 2019-05-11 NOTE — PROGRESS NOTES
WRAP UP GROUP NOTE    Patient's Goal:  Providing feedback as to their own progress in the care-plan provided. Patients have an opportunity to explore self-reflective skills and share any additional cares and concerns not yet addressed. Pt effectively participated.     Energy Level:normal  Appetite:good  Concentration:good  Hallucinations:  Depression:  Anxiety:gone  How I worked today:tried a little  What helps me sleep:read my favorite book \"The Bible\"  Any questions/complaints/comments:no

## 2019-05-11 NOTE — PLAN OF CARE
Problem: Suicide risk  Description  Suicide risk  Goal: Provide patient with safe environment  Description  Provide patient with safe environment  5/11/2019 1159 by Monique Rendon RN  Outcome: Met This Shift  5/10/2019 2240 by Chiara Mills RN  Outcome: Ongoing     Problem: Falls - Risk of:  Goal: Will remain free from falls  Description  Will remain free from falls  5/11/2019 1159 by Monique Rendon RN  Outcome: Met This Shift  5/10/2019 2240 by Chiara Mills RN  Outcome: Ongoing  Goal: Absence of physical injury  Description  Absence of physical injury  5/11/2019 1159 by Monique Rendon RN  Outcome: Met This Shift  5/10/2019 2240 by Chiara Mills RN  Outcome: Ongoing     Problem: Depressive Behavior With or Without Suicide Precautions:  Goal: Able to verbalize acceptance of life and situations over which he or she has no control  Description  Able to verbalize acceptance of life and situations over which he or she has no control  5/11/2019 1159 by Monique Rendon RN  Outcome: Ongoing  5/10/2019 2240 by Chiara Mills RN  Outcome: Ongoing  Goal: Able to verbalize and/or display a decrease in depressive symptoms  Description  Able to verbalize and/or display a decrease in depressive symptoms  5/11/2019 1159 by Monique Rendon RN  Outcome: Ongoing  5/10/2019 2240 by Chiara Mills RN  Outcome: Ongoing  Goal: Ability to disclose and discuss suicidal ideas will improve  Description  Ability to disclose and discuss suicidal ideas will improve  5/11/2019 1159 by Monique Rendon RN  Outcome: Ongoing  5/10/2019 2240 by Chiara Mills RN  Outcome: Ongoing  Goal: Able to verbalize support systems  Description  Able to verbalize support systems  5/11/2019 1159 by Monique Rendon RN  Outcome: Ongoing  5/10/2019 2240 by Chiara Mills RN  Outcome: Ongoing  Goal: Absence of self-harm  Description  Absence of self-harm  5/11/2019 1159 by Monique Rendon RN  Outcome: Met This Shift  5/10/2019 2240 by Chiara Mills RN  Outcome: Ongoing  Goal: Participates in care planning  Description  Participates in care planning  5/11/2019 1159 by Sunday Ahumada RN  Outcome: Ongoing  5/10/2019 2240 by Alisa Rivera RN  Outcome: Ongoing

## 2019-05-11 NOTE — PROGRESS NOTES
Group Therapy Note    Date: 5/11/2019  Start Time: 0830  End Time:  0845  Number of Participants: 4    Type of Group: [employer]        Status After Intervention: Improved    Participation Level:  Active Listener and Interactive    Participation Quality: Appropriate and Attentive      Speech: Normal      Thought Process/Content: Logical      Affective Functioning: Flat      Mood: anxious      Level of consciousness:  Alert and Oriented x4      Response to Learning: Progressing to goal      Endings: None Reported    Modes of Intervention: Education      Discipline Responsible: Registered Nurse      Signature:  Arianna Billingsley RN

## 2019-05-11 NOTE — PROGRESS NOTES
[facility] Daily Shift Assessment  Nursing Progress Note    Room: 0621/621-01 Name: Kelvin Fox Age: 48 y.o. Gender: female   Dx: Dep w/ SI  Precautions: suicide risk and fall risk  Target Symptoms:   Accu-Chek: NoSleep: Yes,Sleep Quality Good SI no plan AV Denies   [address] [place] [address]  ADLs: Yes - Showered this shift Speech: normal Depression: 4 Anxiety: 0   Participation LevelActive Listener  Visitation: No    Participation QualityAppropriate and Attentive    Complaints:    Notes:   Pt flat, made good eye contact and quiet during interview. She reported no sleep issues and had a normal appetite. She isolated to her room most of the morning. She attended group and talked with staff and patients most of the afternoon. She reported that her  was controlling and she would like to go live with her daughter. Will continue to monitor.     Signature:

## 2019-05-11 NOTE — H&P
HISTORY and PHYSICAL      CHIEF COMPLAINT:  Depression, SI    Reason for Admission:  Depression, SI    History Obtained From:  patient    HISTORY OF PRESENT ILLNESS:      The patient is a 48 y.o. female who is admitted to the [place] [address] unit with worsening mood issues. She has no c/o CP or SOA. No abdominal pain or N/V. No dysuria. No weakness or HA. No new pain issues. No fevers. Past Medical History:        Diagnosis Date    Anxiety     Arthritis     Cryptogenic stroke ([address] 12/2017    X 2 WITH LOOP RECORDER PUT IN 2018    Depression     Ganglia     Hypertension     Memory loss     Mixed hyperlipidemia 3/13/2018    Pacemaker 2019    Status post placement of implantable loop recorder 2018     Past Surgical History:        Procedure Laterality Date     SECTION      x2    CHOLECYSTECTOMY      COSMETIC SURGERY      HAND SURGERY      INSERTABLE CARDIAC MONITOR  2018    CT ERIN VIERA JT W ANESTHESIA Left 12/3/2018    SHOULDER MANIPULATION UNDER ANESTHESIA WITH CORTICOSTEROID INJECTION performed by Zakiya Vázquez MD at [address] [facility] [place]         Medications Prior to Admission:    Medications Prior to Admission: hydrOXYzine (VISTARIL) 25 MG capsule, Take 25 mg by mouth 3 times daily as needed for Itching  FLUoxetine (PROZAC) 20 MG capsule, Take 1 capsule by mouth daily (Patient taking differently: Take 60 mg by mouth daily )  lisinopril-hydrochlorothiazide (PRINZIDE;ZESTORETIC) 20-25 MG per tablet, Take 1 tablet by mouth daily  ondansetron (ZOFRAN) 4 MG tablet, Take 1 tablet by mouth daily as needed for Nausea or Vomiting  gabapentin (NEURONTIN) 300 MG capsule, Take 300 mg by mouth 3 times daily. [employer]   aspirin 81 MG EC tablet, Take 1 tablet by mouth daily  pravastatin (PRAVACHOL) 40 MG tablet, Take 1 tablet by mouth nightly  OLANZapine (ZYPREXA) 5 MG tablet, Take 1 tablet by mouth nightly  fluticasone (FLONASE) 50 MCG/ACT nasal spray, 1 spray by Nasal route daily    Allergies:  Latex and Penicillins    Social History:   TOBACCO:   reports that she has been smoking cigarettes. She has a 17.50 pack-year smoking history. She has never used smokeless tobacco.  ETOH:   reports that she drinks alcohol. DRUGS:   reports that she does not use drugs. Family History:       Problem Relation Age of Onset    Mental Illness Mother     Diabetes Mother     Cancer Mother     Mental Illness Father      REVIEW OF SYSTEMS:  Constitutional: neg  CV: neg  Pulmonary: neg  GI: neg  : neg  Psych: depression, SI  Neuro: neg  Skin: neg  MusculoSkeletal: neg  HEENT: neg  Joints: neg    Vitals:  /88   Pulse 91   Temp 97.4 °F (36.3 °C) (Temporal)   Resp 18   Ht 5' 4\" (1.626 m)   Wt 162 lb 2 oz (73.5 kg)   LMP  (LMP Unknown)   SpO2 95%   BMI 27.83 kg/m²     PHYSICAL EXAM:  Gen: NAD, alert  HEENT: WNL  Lymph: no LAD  Neck: no JVD or masses  Chest: CTA bilat  CV: RRR  Abdomen: NT/ND  Extrem: no C/C/E  Neuro: non focal  Skin: no rashes  Joints: no redness    DATA:  I have reviewed the admission labs and imaging tests.     ASSESSMENT AND PLAN:      Patient Active Hospital Problem List:   Major depressive disorder, recurrent severe without psychotic features, SI--follow with Psych  HTN--monitor BP   HPL---continue medicine        Caden Rodriguez MD  10:05 PM 5/10/2019

## 2019-05-12 PROCEDURE — 99232 SBSQ HOSP IP/OBS MODERATE 35: CPT | Performed by: PSYCHIATRY & NEUROLOGY

## 2019-05-12 PROCEDURE — 1240000000 HC EMOTIONAL WELLNESS R&B

## 2019-05-12 PROCEDURE — 6370000000 HC RX 637 (ALT 250 FOR IP): Performed by: PSYCHIATRY & NEUROLOGY

## 2019-05-12 RX ADMIN — GABAPENTIN 300 MG: 300 CAPSULE ORAL at 09:04

## 2019-05-12 RX ADMIN — ASPIRIN 81 MG: 81 TABLET ORAL at 09:04

## 2019-05-12 RX ADMIN — OLANZAPINE 5 MG: 5 TABLET, FILM COATED ORAL at 20:55

## 2019-05-12 RX ADMIN — LISINOPRIL 20 MG: 20 TABLET ORAL at 09:03

## 2019-05-12 RX ADMIN — GABAPENTIN 300 MG: 300 CAPSULE ORAL at 20:55

## 2019-05-12 RX ADMIN — TRAZODONE HYDROCHLORIDE 50 MG: 50 TABLET ORAL at 20:55

## 2019-05-12 RX ADMIN — PRAVASTATIN SODIUM 40 MG: 20 TABLET ORAL at 20:55

## 2019-05-12 RX ADMIN — HYDROCHLOROTHIAZIDE 25 MG: 25 TABLET ORAL at 09:04

## 2019-05-12 RX ADMIN — Medication 3 MG: at 20:55

## 2019-05-12 RX ADMIN — FLUOXETINE HYDROCHLORIDE 60 MG: 20 CAPSULE ORAL at 09:03

## 2019-05-12 RX ADMIN — GABAPENTIN 300 MG: 300 CAPSULE ORAL at 13:21

## 2019-05-12 RX ADMIN — FLUTICASONE PROPIONATE 1 SPRAY: 50 SPRAY, METERED NASAL at 09:04

## 2019-05-12 ASSESSMENT — PAIN SCALES - GENERAL: PAINLEVEL_OUTOF10: 0

## 2019-05-12 NOTE — PROGRESS NOTES
[facility] Daily Shift Assessment-Geriatric Unit  Nursing Progress Note    Room: [address] [place] [address]   Name: Ad Betts   Age: 48 y.o. Gender: female   Dx: <principal problem not specified>  Precautions: suicide risk and fall risk  Inpatient Status: voluntary       Sleep: Yes,   Sleep Quality Good   Hours Slept: 8   Sleep Medications: Yes  PRN Sleep Meds: No       Other PRN Meds: No   Med Compliant: Yes   Accu-Chek: No   Oxygen: No         SI denies suicidal ideation    HI Negative for homicidal ideation        AVH:Absent      Depression: 4   Anxiety: 0       Appetite: good    Social: No   Speech: normal   Appearance: appropriately dressed  Assistive Devices: walker  Level of Assist: Independent        Group Participation: No  Participation LevelNone    Participation Aly Hammonds    Notes:  Patient in room at the beginning of the shift and remains in room at this time. Patient did not participate in wrap up group tonight. Patient appeared to be having some difficulty swallowing her medication at bedtime tonight. Talked with patient about when she takes tablets to take a drink and look up so the medicine will drop down into the water in her mouth then to swallow, and that when she is taking capsules to drop her chin to her chest after she takes a drink of water so the capsule will float up and then swallow. Patient resting in bed at this time with eyes closed. Will continue to monitor for safety.
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
## 2019-05-12 NOTE — PROGRESS NOTES
Group Therapy Note    Date: 5/12/19  Start Time: 0900  End Time:  0930  Number of Participants: 5    Type of Group: Community Meeting    Patient's Goal:  Patient didn't fill in goals    Notes:    Patient attended group, filled out menu and goals sheet. Continue to monitor for safety. Status After Intervention:  Improved    Participation Level:  Active Listener    Participation Quality: Appropriate    Discipline Responsible: [address], [facility]      Signature:  Debbie Ervin

## 2019-05-12 NOTE — PLAN OF CARE
Problem: Suicide risk  Goal: Provide patient with safe environment  Description  Provide patient with safe environment  5/12/2019 0125 by Ermelinda Broderick RN  Outcome: Met This Shift  5/11/2019 1159 by Tadeo Hinton RN  Outcome: Met This Shift     Problem: Falls - Risk of:  Goal: Will remain free from falls  Description  Will remain free from falls  5/12/2019 0125 by Ermelinda Broderick RN  Outcome: Met This Shift  5/11/2019 1159 by Tadeo Hinton RN  Outcome: Met This Shift  Goal: Absence of physical injury  Description  Absence of physical injury  5/12/2019 0125 by Ermelinda Broderick RN  Outcome: Met This Shift  5/11/2019 1159 by Tadeo Hinton RN  Outcome: Met This Shift     Problem: Depressive Behavior With or Without Suicide Precautions:  Goal: Absence of self-harm  Description  Absence of self-harm  5/12/2019 0125 by Ermelinda Broderick RN  Outcome: Met This Shift  5/11/2019 1159 by Tadeo Hinton RN  Outcome: Met This Shift     Problem: Depressive Behavior With or Without Suicide Precautions:  Goal: Able to verbalize acceptance of life and situations over which he or she has no control  Description  Able to verbalize acceptance of life and situations over which he or she has no control  5/12/2019 0125 by Ermelinda Broderick RN  Outcome: Ongoing  5/11/2019 1159 by Tadeo Hinton RN  Outcome: Ongoing  Goal: Able to verbalize and/or display a decrease in depressive symptoms  Description  Able to verbalize and/or display a decrease in depressive symptoms  5/12/2019 0125 by Ermelinda Broderick RN  Outcome: Ongoing  5/11/2019 1159 by Tadeo Hinton RN  Outcome: Ongoing  Goal: Ability to disclose and discuss suicidal ideas will improve  Description  Ability to disclose and discuss suicidal ideas will improve  5/12/2019 0125 by Ermelinda Broderick RN  Outcome: Ongoing  5/11/2019 1159 by Tadeo Hinton RN  Outcome: Ongoing  Goal: Able to verbalize support systems  Description  Able to verbalize support systems  5/12/2019 0125 by Ermelinda Broderick RN  Outcome: Ongoing  5/11/2019 1159 by Arianna Billingsley RN  Outcome: Ongoing  Goal: Participates in care planning  Description  Participates in care planning  5/12/2019 0125 by Edie Lei RN  Outcome: Ongoing  5/11/2019 1159 by Arianna Billingsley RN  Outcome: Ongoing

## 2019-05-13 VITALS
TEMPERATURE: 98.2 F | SYSTOLIC BLOOD PRESSURE: 101 MMHG | OXYGEN SATURATION: 93 % | DIASTOLIC BLOOD PRESSURE: 68 MMHG | HEART RATE: 87 BPM | RESPIRATION RATE: 16 BRPM | WEIGHT: 162.13 LBS | HEIGHT: 64 IN | BODY MASS INDEX: 27.68 KG/M2

## 2019-05-13 PROCEDURE — 6370000000 HC RX 637 (ALT 250 FOR IP): Performed by: PSYCHIATRY & NEUROLOGY

## 2019-05-13 PROCEDURE — 6370000000 HC RX 637 (ALT 250 FOR IP): Performed by: FAMILY MEDICINE

## 2019-05-13 PROCEDURE — 99239 HOSP IP/OBS DSCHRG MGMT >30: CPT | Performed by: PSYCHIATRY & NEUROLOGY

## 2019-05-13 RX ORDER — FLUOXETINE HYDROCHLORIDE 20 MG/1
60 CAPSULE ORAL DAILY
Qty: 30 CAPSULE | Refills: 1 | Status: ON HOLD | OUTPATIENT
Start: 2019-05-14 | End: 2020-04-27 | Stop reason: HOSPADM

## 2019-05-13 RX ORDER — ERGOCALCIFEROL (VITAMIN D2) 1250 MCG
50000 CAPSULE ORAL WEEKLY
Qty: 11 CAPSULE | Refills: 0 | Status: SHIPPED | OUTPATIENT
Start: 2019-05-18 | End: 2019-06-28

## 2019-05-13 RX ORDER — LANOLIN ALCOHOL/MO/W.PET/CERES
3 CREAM (GRAM) TOPICAL NIGHTLY
Qty: 30 TABLET | Refills: 1 | Status: ON HOLD | OUTPATIENT
Start: 2019-05-13 | End: 2021-02-23 | Stop reason: HOSPADM

## 2019-05-13 RX ORDER — CHOLECALCIFEROL (VITAMIN D3) 125 MCG
500 CAPSULE ORAL DAILY
Status: DISCONTINUED | OUTPATIENT
Start: 2019-05-13 | End: 2019-05-13 | Stop reason: HOSPADM

## 2019-05-13 RX ORDER — TRAZODONE HYDROCHLORIDE 50 MG/1
50 TABLET ORAL NIGHTLY
Qty: 30 TABLET | Refills: 1 | Status: ON HOLD | OUTPATIENT
Start: 2019-05-13 | End: 2020-04-27 | Stop reason: HOSPADM

## 2019-05-13 RX ADMIN — LISINOPRIL 20 MG: 20 TABLET ORAL at 09:56

## 2019-05-13 RX ADMIN — GABAPENTIN 300 MG: 300 CAPSULE ORAL at 09:56

## 2019-05-13 RX ADMIN — HYDROCHLOROTHIAZIDE 25 MG: 25 TABLET ORAL at 09:55

## 2019-05-13 RX ADMIN — FLUOXETINE HYDROCHLORIDE 60 MG: 20 CAPSULE ORAL at 09:56

## 2019-05-13 RX ADMIN — GABAPENTIN 300 MG: 300 CAPSULE ORAL at 14:09

## 2019-05-13 RX ADMIN — CYANOCOBALAMIN TAB 500 MCG 500 MCG: 500 TAB at 14:09

## 2019-05-13 RX ADMIN — ASPIRIN 81 MG: 81 TABLET ORAL at 09:56

## 2019-05-13 NOTE — PROGRESS NOTES
Pt reported she called her niece and her niece said that pt could come and stay with her. Pt also reported her niece would be picking her up as soon as she gets off work around 3:30 PM. Pt reported she was happy to be going to her nieces to stay.      Electronically signed by [employer], [id number] on 5/13/2019 at 1:34 PM

## 2019-05-13 NOTE — PROGRESS NOTES
NEVA spoke with pt about discharge plans. Pt reported if she cannot get a hold of her daughter then her niece will be off work at 2 PM and she will call her niece. Pt reported she thinks her niece will let her stay with her. NEVA voiced understanding.      Electronically signed by Cristine Mayes, [id number] on 5/13/2019 at 12:47 PM
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
## 2019-05-13 NOTE — PLAN OF CARE
Problem: Depressive Behavior With or Without Suicide Precautions:  Goal: Able to verbalize acceptance of life and situations over which he or she has no control  Description  Able to verbalize acceptance of life and situations over which he or she has no control   5/13/2019 1158 by Leif Knapp  Outcome: Ongoing  Note:                                                                       Group Therapy Note    Date: 5/13/2019  Start Time: 1030  End Time:  1130  Number of Participants: 4    Type of Group: Psychoeducation    Wellness Binder Information  Module Name:  Men's Issues  Session Number:  1    Patient's Goal:  To improve knowledge of effective stress management techniques    Notes:  Pt demonstrated improved knowledge of effective stress management techniques by actively participating in group discussion.     Status After Intervention:  Unchanged    Participation Level: Interactive    Participation Quality: Attentive      Speech:  normal      Thought Process/Content: Logical      Affective Functioning: Congruent      Mood: anxious and depressed      Level of consciousness:  Alert and Oriented x4      Response to Learning: Able to verbalize current knowledge/experience, Able to verbalize/acknowledge new learning and Progressing to goal      Endings: None Reported    Modes of Intervention: Education      Discipline Responsible: Psychoeducational Specialist      Signature:  Leif Knapp

## 2019-05-13 NOTE — PROGRESS NOTES
[facility] Daily Shift Assessment-Geriatric Unit  Nursing Progress Note    Room: [address] [place] [address]   Name: Gerda Hilliard   Age: 48 y.o.    Gender: female   Dx: <principal problem not specified>  Precautions: suicide risk and fall risk  Inpatient Status: voluntary       Sleep: Yes,   Sleep Quality Good   Hours Slept: 7   Sleep Medications: Yes  PRN Sleep Meds: Yes       Other PRN Meds: Yes   Med Compliant: Yes   Accu-Chek: No   Oxygen: No         SI denies suicidal ideation    HI Negative for homicidal ideation        AVH:Absent / Gwenevere Stagers had any\"      Depression: Yes, \"its mothers Day and I am not home, I felt sad on and off today\"   Anxiety: yes, improved       Appetite: no change from normal    Social: Yes and minimal   Speech: normal   Appearance: appropriately dressed and healthy looking  Assistive Devices: walker and other careful monitoring while ambulation  Level of Assist: Supervision        Group Participation: Yes  Participation LevelActive Listener    Participation QualityAppropriate    Notes: pt affect appropriate, behavior calm and cooperative, social, performed ADL, sleeping well this shift, pt denies AVH, SI and HI, will continue to monitor for safety    Electronically signed by Carlos Aguilar RN on 5/13/19 at 1:53 AM

## 2019-05-13 NOTE — PLAN OF CARE
Problem: Suicide risk  Goal: Provide patient with safe environment  Description  Provide patient with safe environment  Outcome: Met This Shift     Problem: Falls - Risk of:  Goal: Will remain free from falls  Description  Will remain free from falls  Outcome: Met This Shift  Goal: Absence of physical injury  Description  Absence of physical injury  Outcome: Met This Shift     Problem: Depressive Behavior With or Without Suicide Precautions:  Goal: Absence of self-harm  Description  Absence of self-harm  Outcome: Met This Shift     Problem: Depressive Behavior With or Without Suicide Precautions:  Goal: Able to verbalize acceptance of life and situations over which he or she has no control  Description  Able to verbalize acceptance of life and situations over which he or she has no control  Outcome: Ongoing  Goal: Able to verbalize and/or display a decrease in depressive symptoms  Description  Able to verbalize and/or display a decrease in depressive symptoms  Outcome: Ongoing  Goal: Ability to disclose and discuss suicidal ideas will improve  Description  Ability to disclose and discuss suicidal ideas will improve  Outcome: Ongoing  Goal: Able to verbalize support systems  Description  Able to verbalize support systems  Outcome: Ongoing  Goal: Participates in care planning  Description  Participates in care planning  Outcome: Ongoing

## 2019-05-13 NOTE — PROGRESS NOTES
SW attempted to call [facility] homeless shelter due to pt telling this writer she has no where to go. SW left contact information requesting a call back.      Electronically signed by Whitney Morales, [address] on 5/13/2019 at 11:52 AM

## 2019-05-13 NOTE — PLAN OF CARE
Problem: Depressive Behavior With or Without Suicide Precautions:  Goal: Able to verbalize acceptance of life and situations over which he or she has no control  Description  Able to verbalize acceptance of life and situations over which he or she has no control   5/13/2019 1426 by Jewels Zuñiga  Outcome: Ongoing  Note:                                                                       Group Therapy Note    Date: 5/13/2019  Start Time: 1330  End Time:  1415  Number of Participants: 4    Type of Group: Cognitive Skills    Wellness Binder Information  Module Name:  Stress  Session Number:  5    Patient's Goal:  To raise awareness of the effectiveness of diversionary coping skills    Notes:  Pt demonstrated improved awareness of the effectiveness of diversionary coping skills by actively participating in group discussion.     Status After Intervention:  Unchanged    Participation Level: Interactive    Participation Quality: Attentive      Speech:  normal      Thought Process/Content: Logical      Affective Functioning: Congruent      Mood: anxious and depressed      Level of consciousness:  Alert and Oriented x4      Response to Learning: Able to verbalize current knowledge/experience, Able to verbalize/acknowledge new learning and Progressing to goal      Endings: None Reported    Modes of Intervention: Education      Discipline Responsible: Psychoeducational Specialist      Signature:  Jewels Zuñiga

## 2019-05-13 NOTE — PROGRESS NOTES
Group Therapy Note    Date: 5/13/19  Start Time: 0900  End Time:  0930  Number of Participants: 5    Type of Group: Community Meeting    Patient's Goal:  none    Notes:    Patient attended group, filled out menu and goals sheet. Continue to monitor for safety. Status After Intervention:  Improved    Participation Level:  Active Listener    Participation Quality: Appropriate    Discipline Responsible: [address], [facility]      Signature:  Kehinde De La Rosa

## 2019-05-13 NOTE — PROGRESS NOTES
[facility]  Discharge Note  Bridge Appointment completed: Reviewed Discharge Instructions with patient. Patient verbalizes understanding and agreement with the discharge plan using the teachback method. Referral for Outpatient Tobacco Cessation Counseling, upon discharge (nico X if applicable and completed):    ( )  Hospital staff assisted patient to call Quit Line or faxed referral                                   during hospitalization                  ( )  Recognizing danger situations (included triggers and roadblocks), if not completed on admission                    (x )  Coping skills (new ways to manage stress, exercise, relaxation techniques, changing routine, distraction), if not completed on admission                                                           ( )  Basic information about quitting (benefits of quitting, techniques in how to quit, available resources, if not completed on admission  ( ) Referral for counseling faxed to [facility]   ( ) Patient refused referral  ( ) Patient refused counseling  (x ) Patient refused smoking cessation medication upon discharge    Vaccinations (nico X if applicable and completed):  ( ) Patient states already received influenza vaccine elsewhere  ( ) Patient received influenza vaccine during this hospitalization  (x ) Patient refused influenza vaccine at this time      Pt discharged with followings belongings:   Dentures: None  Vision - Corrective Lenses: None  Hearing Aid: None  Jewelry: None  Body Piercings Removed: N/A  Clothing: Undergarments (Comment), Footwear, Pants, Shirt  Were All Patient Medications Collected?: Not Applicable  Other Valuables: None   Valuables retrieved from safe and returned to patient. Patient left department with   via  , discharged to  . Patient education on aftercare instructions: given  Patient verbalize understanding of AVS:  yes. SI? no plan AVH? denies HI?  Negative for homicidal ideation

## 2019-05-13 NOTE — PROGRESS NOTES
SW met with treatment team to discuss pt's progress and setbacks. SW 2 was present. Pt reportedly slept well last night, with medications, appetite is good, attends scheduled group activities, minimally social with peers/staff, performs ADL's, compliant with medications,behavior has been calm and cooperative, reports improved anxiety/depression, denies SI/HI/AVH, will be discharged today, follow-up appointments will be scheduled.

## 2019-05-13 NOTE — PROGRESS NOTES
WRAP UP GROUP NOTE:     Patient's Goal:  Providing feedback as to their own progress in the care-plan provided. Pt's have an opportunity to explore self-reflective skills and share any additional cares and concerns not yet addressed. Pt effectively participated. Energy level:  Low   Appetite:  Improving   Concentration:   Good   Hallucinations:  Gone, haven't had any  Depression:  Improved   Anxiety:  Improved   How I worked today:  Clejer Oneill a little  What helps me sleep:  Read-take meds to help me sleep/read Bible  Any questions/complaints/comments:  No none  Group/activities that helped me today were:  Didn't have any group session, one to one with staff 15-30 minutes  Self-enhancement- where to go when we leave here.

## 2019-05-15 NOTE — PROGRESS NOTES
Progress Note  Sylvester Benson  5/14/2019 9:28 PM  Subjective:   Admit Date:   5/9/2019      CC/ADMIT DX:       Interval History:   Reviewed overnight events and nursing notes. No new medical complaints. I have reviewed all labs/diagnostics from the last 24hrs. ROS:   I have done a 10 point ROS and all are negative, except what is mentioned in the HPI. No diet orders on file    Medications:             Objective:   Vitals: /68   Pulse 87   Temp 98.2 °F (36.8 °C) (Temporal)   Resp 16   Ht 5' 4\" (1.626 m)   Wt 162 lb 2 oz (73.5 kg)   LMP  (LMP Unknown)   SpO2 93%   BMI 27.83 kg/m²  No intake or output data in the 24 hours ending 05/14/19 2128  General appearance: alert and cooperative with exam  Lungs: clear to auscultation bilaterally  Heart: RRR  Abdomen: soft, non-tender; bowel sounds normal; no masses,  no organomegaly  Extremities: extremities normal, atraumatic, no cyanosis or edema  Neurologic:  No obvious focal neurologic deficits. Assessment and Plan: Active Problems:    Major depressive disorder, recurrent severe without psychotic features ([facility] [address]  Resolved Problems:    * No resolved hospital problems. *      Plan:  1. Continue present medication(s)   2. She is medically stable. I will monitor for any changes or concerns. 3.  Follow with Psych      Discharge planning:   her home     Reviewed treatment plans with the patient and/or family.              Electronically signed by Brock Werner MD on 5/14/2019 at 9:28 PM

## 2019-06-07 DIAGNOSIS — Z95.0 PACEMAKER: Primary | ICD-10-CM

## 2019-06-07 DIAGNOSIS — I49.5 SINOATRIAL NODE DYSFUNCTION (HCC): ICD-10-CM

## 2019-06-07 PROCEDURE — 93294 REM INTERROG EVL PM/LDLS PM: CPT | Performed by: CLINICAL NURSE SPECIALIST

## 2019-06-07 PROCEDURE — 93296 REM INTERROG EVL PM/IDS: CPT | Performed by: CLINICAL NURSE SPECIALIST

## 2019-06-28 ENCOUNTER — HOSPITAL ENCOUNTER (INPATIENT)
Age: 54
LOS: 7 days | Discharge: HOME | DRG: 885 | End: 2019-07-05
Attending: EMERGENCY MEDICINE | Admitting: PSYCHIATRY & NEUROLOGY
Payer: MEDICAID

## 2019-06-28 DIAGNOSIS — R45.850 HOMICIDAL IDEATION: ICD-10-CM

## 2019-06-28 DIAGNOSIS — F32.A DEPRESSION, UNSPECIFIED DEPRESSION TYPE: Primary | ICD-10-CM

## 2019-06-28 LAB
ALBUMIN SERPL-MCNC: 4.3 G/DL (ref 3.5–5.2)
ALP BLD-CCNC: 66 U/L (ref 35–104)
ALT SERPL-CCNC: 10 U/L (ref 5–33)
AMPHETAMINE SCREEN, URINE: NEGATIVE
ANION GAP SERPL CALCULATED.3IONS-SCNC: 15 MMOL/L (ref 7–19)
AST SERPL-CCNC: 12 U/L (ref 5–32)
BACTERIA: ABNORMAL /HPF
BARBITURATE SCREEN URINE: NEGATIVE
BASOPHILS ABSOLUTE: 0.1 K/UL (ref 0–0.2)
BASOPHILS RELATIVE PERCENT: 0.7 % (ref 0–1)
BENZODIAZEPINE SCREEN, URINE: NEGATIVE
BILIRUB SERPL-MCNC: 0.3 MG/DL (ref 0.2–1.2)
BILIRUBIN URINE: NEGATIVE
BLOOD, URINE: ABNORMAL
BUN BLDV-MCNC: 16 MG/DL (ref 6–20)
CALCIUM SERPL-MCNC: 9.4 MG/DL (ref 8.6–10)
CANNABINOID SCREEN URINE: NEGATIVE
CHLORIDE BLD-SCNC: 108 MMOL/L (ref 98–111)
CLARITY: CLEAR
CO2: 21 MMOL/L (ref 22–29)
COCAINE METABOLITE SCREEN URINE: NEGATIVE
COLOR: YELLOW
CREAT SERPL-MCNC: 0.9 MG/DL (ref 0.5–0.9)
EOSINOPHILS ABSOLUTE: 0.2 K/UL (ref 0–0.6)
EOSINOPHILS RELATIVE PERCENT: 2.2 % (ref 0–5)
EPITHELIAL CELLS, UA: 5 /HPF (ref 0–5)
ETHANOL: <10 MG/DL (ref 0–0.08)
GFR NON-AFRICAN AMERICAN: >60
GLUCOSE BLD-MCNC: 84 MG/DL (ref 74–109)
GLUCOSE URINE: NEGATIVE MG/DL
HCT VFR BLD CALC: 41.5 % (ref 37–47)
HEMOGLOBIN: 13.5 G/DL (ref 12–16)
HYALINE CASTS: 4 /HPF (ref 0–8)
KETONES, URINE: 15 MG/DL
LEUKOCYTE ESTERASE, URINE: ABNORMAL
LYMPHOCYTES ABSOLUTE: 2.8 K/UL (ref 1.1–4.5)
LYMPHOCYTES RELATIVE PERCENT: 36.7 % (ref 20–40)
Lab: NORMAL
MCH RBC QN AUTO: 32.1 PG (ref 27–31)
MCHC RBC AUTO-ENTMCNC: 32.5 G/DL (ref 33–37)
MCV RBC AUTO: 98.6 FL (ref 81–99)
MONOCYTES ABSOLUTE: 0.6 K/UL (ref 0–0.9)
MONOCYTES RELATIVE PERCENT: 8 % (ref 0–10)
NEUTROPHILS ABSOLUTE: 4 K/UL (ref 1.5–7.5)
NEUTROPHILS RELATIVE PERCENT: 52.3 % (ref 50–65)
NITRITE, URINE: NEGATIVE
OPIATE SCREEN URINE: NEGATIVE
PDW BLD-RTO: 15.2 % (ref 11.5–14.5)
PH UA: 6 (ref 5–8)
PLATELET # BLD: 226 K/UL (ref 130–400)
PMV BLD AUTO: 9.8 FL (ref 9.4–12.3)
POTASSIUM SERPL-SCNC: 3.8 MMOL/L (ref 3.5–5)
PROTEIN UA: NEGATIVE MG/DL
RBC # BLD: 4.21 M/UL (ref 4.2–5.4)
RBC UA: 3 /HPF (ref 0–4)
SODIUM BLD-SCNC: 144 MMOL/L (ref 136–145)
SPECIFIC GRAVITY UA: 1.02 (ref 1–1.03)
TOTAL PROTEIN: 7.4 G/DL (ref 6.6–8.7)
URINE REFLEX TO CULTURE: YES
UROBILINOGEN, URINE: 0.2 E.U./DL
WBC # BLD: 7.7 K/UL (ref 4.8–10.8)
WBC UA: 13 /HPF (ref 0–5)

## 2019-06-28 PROCEDURE — 81001 URINALYSIS AUTO W/SCOPE: CPT

## 2019-06-28 PROCEDURE — 1240000000 HC EMOTIONAL WELLNESS R&B

## 2019-06-28 PROCEDURE — 80307 DRUG TEST PRSMV CHEM ANLYZR: CPT

## 2019-06-28 PROCEDURE — 80053 COMPREHEN METABOLIC PANEL: CPT

## 2019-06-28 PROCEDURE — G0480 DRUG TEST DEF 1-7 CLASSES: HCPCS

## 2019-06-28 PROCEDURE — 36415 COLL VENOUS BLD VENIPUNCTURE: CPT

## 2019-06-28 PROCEDURE — 99285 EMERGENCY DEPT VISIT HI MDM: CPT

## 2019-06-28 PROCEDURE — 99285 EMERGENCY DEPT VISIT HI MDM: CPT | Performed by: EMERGENCY MEDICINE

## 2019-06-28 PROCEDURE — 85025 COMPLETE CBC W/AUTO DIFF WBC: CPT

## 2019-06-28 PROCEDURE — 87086 URINE CULTURE/COLONY COUNT: CPT

## 2019-06-28 ASSESSMENT — SLEEP AND FATIGUE QUESTIONNAIRES
RESTFUL SLEEP: NO
AVERAGE NUMBER OF SLEEP HOURS: 4
DO YOU USE A SLEEP AID: NO
DIFFICULTY FALLING ASLEEP: YES
DO YOU HAVE DIFFICULTY SLEEPING: YES
DIFFICULTY STAYING ASLEEP: YES
DIFFICULTY ARISING: YES
SLEEP PATTERN: DIFFICULTY FALLING ASLEEP;INSOMNIA;DISTURBED/INTERRUPTED SLEEP;RESTLESSNESS

## 2019-06-28 ASSESSMENT — ENCOUNTER SYMPTOMS
ABDOMINAL PAIN: 0
COUGH: 0
SHORTNESS OF BREATH: 0
VOMITING: 0

## 2019-06-28 ASSESSMENT — LIFESTYLE VARIABLES: HISTORY_ALCOHOL_USE: YES

## 2019-06-28 ASSESSMENT — PATIENT HEALTH QUESTIONNAIRE - PHQ9
SUM OF ALL RESPONSES TO PHQ QUESTIONS 1-9: 18
SUM OF ALL RESPONSES TO PHQ QUESTIONS 1-9: 23

## 2019-06-29 PROBLEM — R45.851 SUICIDAL IDEATIONS: Status: ACTIVE | Noted: 2019-06-29

## 2019-06-29 PROCEDURE — 90792 PSYCH DIAG EVAL W/MED SRVCS: CPT | Performed by: PSYCHIATRY & NEUROLOGY

## 2019-06-29 PROCEDURE — 6370000000 HC RX 637 (ALT 250 FOR IP): Performed by: PSYCHIATRY & NEUROLOGY

## 2019-06-29 PROCEDURE — 1240000000 HC EMOTIONAL WELLNESS R&B

## 2019-06-29 RX ORDER — NICOTINE 21 MG/24HR
1 PATCH, TRANSDERMAL 24 HOURS TRANSDERMAL DAILY
Status: DISCONTINUED | OUTPATIENT
Start: 2019-06-29 | End: 2019-07-05 | Stop reason: HOSPADM

## 2019-06-29 RX ORDER — HALOPERIDOL 5 MG
5 TABLET ORAL ONCE
Status: COMPLETED | OUTPATIENT
Start: 2019-06-29 | End: 2019-06-29

## 2019-06-29 RX ORDER — LORAZEPAM 1 MG/1
2 TABLET ORAL ONCE
Status: COMPLETED | OUTPATIENT
Start: 2019-06-29 | End: 2019-06-29

## 2019-06-29 RX ORDER — ACETAMINOPHEN 325 MG/1
650 TABLET ORAL EVERY 4 HOURS PRN
Status: CANCELLED | OUTPATIENT
Start: 2019-06-29

## 2019-06-29 RX ORDER — PRAVASTATIN SODIUM 20 MG
40 TABLET ORAL NIGHTLY
Status: DISCONTINUED | OUTPATIENT
Start: 2019-06-29 | End: 2019-07-05 | Stop reason: HOSPADM

## 2019-06-29 RX ORDER — ACETAMINOPHEN 325 MG/1
650 TABLET ORAL EVERY 4 HOURS PRN
Status: DISCONTINUED | OUTPATIENT
Start: 2019-06-29 | End: 2019-07-05 | Stop reason: HOSPADM

## 2019-06-29 RX ADMIN — LORAZEPAM 2 MG: 1 TABLET ORAL at 01:58

## 2019-06-29 RX ADMIN — ACETAMINOPHEN 650 MG: 325 TABLET ORAL at 22:04

## 2019-06-29 RX ADMIN — HALOPERIDOL 5 MG: 5 TABLET ORAL at 01:58

## 2019-06-29 ASSESSMENT — PAIN SCALES - GENERAL
PAINLEVEL_OUTOF10: 1
PAINLEVEL_OUTOF10: 0
PAINLEVEL_OUTOF10: 4

## 2019-06-29 NOTE — PROGRESS NOTES
of Consciousness: Alert  Mood:Normal: No  Mood: Anxious, Irritable  Motor Activity:Normal: No  Motor Activity: Decreased  Interview Behavior: Cooperative  Preception: [place] to Person, [place] Govern to Time, [place] to Place, [place] to Situation  Attention:Normal: No  Attention: Distractible  Thought Processes: Blocking  Thought Content:Normal: No  Thought Content: Poverty of Content  Hallucinations: None  Delusions: No  Memory:Normal: No  Memory: Poor Recent, Poor Remote  Insight and Judgment: No  Insight and Judgment: Poor Judgment, Poor Insight  Present Suicidal Ideation: No  Present Homicidal Ideation: No    C/o:    Notes:
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
## 2019-06-29 NOTE — ED NOTES
Bed: 07  Expected date:   Expected time:   Means of arrival: [facility]  Comments:  EMS: [id number] Jennifer Wayne RN  06/28/19 2011
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
## 2019-06-30 LAB
TSH REFLEX FT4: 1.06 UIU/ML (ref 0.35–5.5)
URINE CULTURE, ROUTINE: NORMAL
VITAMIN B-12: 408 PG/ML (ref 211–946)
VITAMIN D 25-HYDROXY: 28.9 NG/ML

## 2019-06-30 PROCEDURE — 82306 VITAMIN D 25 HYDROXY: CPT

## 2019-06-30 PROCEDURE — 36415 COLL VENOUS BLD VENIPUNCTURE: CPT

## 2019-06-30 PROCEDURE — 6370000000 HC RX 637 (ALT 250 FOR IP): Performed by: PSYCHIATRY & NEUROLOGY

## 2019-06-30 PROCEDURE — 1240000000 HC EMOTIONAL WELLNESS R&B

## 2019-06-30 PROCEDURE — 6370000000 HC RX 637 (ALT 250 FOR IP): Performed by: FAMILY MEDICINE

## 2019-06-30 PROCEDURE — 82607 VITAMIN B-12: CPT

## 2019-06-30 PROCEDURE — 84443 ASSAY THYROID STIM HORMONE: CPT

## 2019-06-30 RX ORDER — ERGOCALCIFEROL 1.25 MG/1
50000 CAPSULE ORAL WEEKLY
Status: CANCELLED | OUTPATIENT
Start: 2019-07-07 | End: 2019-09-22

## 2019-06-30 RX ORDER — GABAPENTIN 300 MG/1
300 CAPSULE ORAL 3 TIMES DAILY
Status: DISCONTINUED | OUTPATIENT
Start: 2019-06-30 | End: 2019-07-05 | Stop reason: HOSPADM

## 2019-06-30 RX ORDER — ERGOCALCIFEROL 1.25 MG/1
50000 CAPSULE ORAL WEEKLY
Status: DISCONTINUED | OUTPATIENT
Start: 2019-06-30 | End: 2019-07-05 | Stop reason: HOSPADM

## 2019-06-30 RX ORDER — TRAZODONE HYDROCHLORIDE 50 MG/1
50 TABLET ORAL NIGHTLY PRN
Status: DISCONTINUED | OUTPATIENT
Start: 2019-06-30 | End: 2019-07-05 | Stop reason: HOSPADM

## 2019-06-30 RX ORDER — LANOLIN ALCOHOL/MO/W.PET/CERES
3 CREAM (GRAM) TOPICAL NIGHTLY
Status: DISCONTINUED | OUTPATIENT
Start: 2019-06-30 | End: 2019-07-05 | Stop reason: HOSPADM

## 2019-06-30 RX ORDER — OLANZAPINE 10 MG/1
10 TABLET ORAL NIGHTLY
Status: DISCONTINUED | OUTPATIENT
Start: 2019-06-30 | End: 2019-07-05 | Stop reason: HOSPADM

## 2019-06-30 RX ORDER — PRAVASTATIN SODIUM 20 MG
40 TABLET ORAL NIGHTLY
Status: CANCELLED | OUTPATIENT
Start: 2019-06-30

## 2019-06-30 RX ORDER — HYDROXYZINE HYDROCHLORIDE 25 MG/1
25 TABLET, FILM COATED ORAL 3 TIMES DAILY PRN
Status: DISCONTINUED | OUTPATIENT
Start: 2019-06-30 | End: 2019-07-01

## 2019-06-30 RX ORDER — FLUOXETINE HYDROCHLORIDE 20 MG/1
60 CAPSULE ORAL DAILY
Status: DISCONTINUED | OUTPATIENT
Start: 2019-06-30 | End: 2019-07-05 | Stop reason: HOSPADM

## 2019-06-30 RX ORDER — NICOTINE 21 MG/24HR
1 PATCH, TRANSDERMAL 24 HOURS TRANSDERMAL DAILY
Status: CANCELLED | OUTPATIENT
Start: 2019-07-01

## 2019-06-30 RX ADMIN — Medication 3 MG: at 21:09

## 2019-06-30 RX ADMIN — FLUOXETINE HYDROCHLORIDE 60 MG: 20 CAPSULE ORAL at 14:35

## 2019-06-30 RX ADMIN — GABAPENTIN 300 MG: 300 CAPSULE ORAL at 14:35

## 2019-06-30 RX ADMIN — GABAPENTIN 300 MG: 300 CAPSULE ORAL at 21:09

## 2019-06-30 RX ADMIN — HYDROXYZINE HYDROCHLORIDE 25 MG: 25 TABLET, FILM COATED ORAL at 14:35

## 2019-06-30 RX ADMIN — OLANZAPINE 10 MG: 10 TABLET, FILM COATED ORAL at 21:09

## 2019-06-30 RX ADMIN — ERGOCALCIFEROL 50000 UNITS: 1.25 CAPSULE ORAL at 14:35

## 2019-06-30 RX ADMIN — PRAVASTATIN SODIUM 40 MG: 20 TABLET ORAL at 21:09

## 2019-06-30 ASSESSMENT — PAIN SCALES - GENERAL
PAINLEVEL_OUTOF10: 0
PAINLEVEL_OUTOF10: 0

## 2019-06-30 NOTE — H&P
HISTORY and PHYSICAL      CHIEF COMPLAINT:  Depression    Reason for Admission:  Depression    History Obtained From:  patient    HISTORY OF PRESENT ILLNESS:      The patient is a 48 y.o. female who is admitted to the [place] [address] unit with worsening mood issues. She has no c/o CP or SOA. No abdominal pain or N/V. No dysuria. No weakness or HA. She has fatigue. No fevers. No new pain concerns. Past Medical History:        Diagnosis Date    Anxiety     Arthritis     Cryptogenic stroke ([address] 12/2017    X 2 WITH LOOP RECORDER PUT IN 2018    Depression     Ganglia     Hypertension     Memory loss     Mixed hyperlipidemia 3/13/2018    Pacemaker 2019    Status post placement of implantable loop recorder 2018     Past Surgical History:        Procedure Laterality Date     SECTION      x2    CHOLECYSTECTOMY      COSMETIC SURGERY      HAND SURGERY      INSERTABLE CARDIAC MONITOR  2018    NC ERIN VIERA JT W ANESTHESIA Left 12/3/2018    SHOULDER MANIPULATION UNDER ANESTHESIA WITH CORTICOSTEROID INJECTION performed by Bay Blackwell MD at [address] [place] [address] OR         Medications Prior to Admission:    Medications Prior to Admission: FLUoxetine (PROZAC) 20 MG capsule, Take 3 capsules by mouth daily  traZODone (DESYREL) 50 MG tablet, Take 1 tablet by mouth nightly  melatonin 3 MG TABS tablet, Take 1 tablet by mouth nightly  lisinopril-hydrochlorothiazide (PRINZIDE;ZESTORETIC) 20-25 MG per tablet, Take 1 tablet by mouth daily  ondansetron (ZOFRAN) 4 MG tablet, Take 1 tablet by mouth daily as needed for Nausea or Vomiting  gabapentin (NEURONTIN) 300 MG capsule, Take 300 mg by mouth 3 times daily. .  pravastatin (PRAVACHOL) 40 MG tablet, Take 1 tablet by mouth nightly  OLANZapine (ZYPREXA) 5 MG tablet, Take 1 tablet by mouth nightly  hydrOXYzine (VISTARIL) 25 MG capsule, Take 25 mg by mouth 3 times daily as needed for Itching    Allergies:

## 2019-06-30 NOTE — PROGRESS NOTES
WRAP UP GROUP NOTE:     Patient's Goal:  Providing feedback as to their own progress in the care-plan provided. Pt's have an opportunity to explore self-reflective skills and share any additional cares and concerns not yet addressed. Pt effectively participated. Energy level:  Low   Appetite:  Poor   Concentration:   Poor   Hallucinations:  Gone   Depression:  Same   Anxiety:  Constant   How I worked today:  Did not try  What helps me sleep:  Sleep   Any questions/complaints/comments:  No   Group/activities that helped me today were:   One-to-one with staff

## 2019-07-01 PROCEDURE — 99233 SBSQ HOSP IP/OBS HIGH 50: CPT | Performed by: PSYCHIATRY & NEUROLOGY

## 2019-07-01 PROCEDURE — 6370000000 HC RX 637 (ALT 250 FOR IP): Performed by: FAMILY MEDICINE

## 2019-07-01 PROCEDURE — 6370000000 HC RX 637 (ALT 250 FOR IP): Performed by: PSYCHIATRY & NEUROLOGY

## 2019-07-01 PROCEDURE — 1240000000 HC EMOTIONAL WELLNESS R&B

## 2019-07-01 RX ORDER — OLANZAPINE 5 MG/1
5 TABLET ORAL 2 TIMES DAILY PRN
Status: DISCONTINUED | OUTPATIENT
Start: 2019-07-01 | End: 2019-07-05 | Stop reason: HOSPADM

## 2019-07-01 RX ORDER — LITHIUM CARBONATE 300 MG/1
300 CAPSULE ORAL
Status: DISCONTINUED | OUTPATIENT
Start: 2019-07-01 | End: 2019-07-05 | Stop reason: HOSPADM

## 2019-07-01 RX ORDER — HYDROXYZINE HYDROCHLORIDE 25 MG/1
25 TABLET, FILM COATED ORAL EVERY 4 HOURS PRN
Status: DISCONTINUED | OUTPATIENT
Start: 2019-07-01 | End: 2019-07-05 | Stop reason: HOSPADM

## 2019-07-01 RX ADMIN — LITHIUM CARBONATE 300 MG: 300 CAPSULE, GELATIN COATED ORAL at 17:08

## 2019-07-01 RX ADMIN — OLANZAPINE 5 MG: 5 TABLET, FILM COATED ORAL at 11:15

## 2019-07-01 RX ADMIN — HYDROXYZINE HYDROCHLORIDE 25 MG: 25 TABLET, FILM COATED ORAL at 19:43

## 2019-07-01 RX ADMIN — OLANZAPINE 5 MG: 5 TABLET, FILM COATED ORAL at 19:43

## 2019-07-01 RX ADMIN — GABAPENTIN 300 MG: 300 CAPSULE ORAL at 21:37

## 2019-07-01 RX ADMIN — PRAVASTATIN SODIUM 40 MG: 20 TABLET ORAL at 21:26

## 2019-07-01 RX ADMIN — LITHIUM CARBONATE 300 MG: 300 CAPSULE, GELATIN COATED ORAL at 11:15

## 2019-07-01 RX ADMIN — GABAPENTIN 300 MG: 300 CAPSULE ORAL at 14:15

## 2019-07-01 RX ADMIN — OLANZAPINE 10 MG: 10 TABLET, FILM COATED ORAL at 21:37

## 2019-07-01 RX ADMIN — Medication 3 MG: at 21:26

## 2019-07-01 RX ADMIN — HYDROXYZINE HYDROCHLORIDE 25 MG: 25 TABLET, FILM COATED ORAL at 11:15

## 2019-07-01 RX ADMIN — GABAPENTIN 300 MG: 300 CAPSULE ORAL at 08:32

## 2019-07-01 RX ADMIN — FLUOXETINE HYDROCHLORIDE 60 MG: 20 CAPSULE ORAL at 08:32

## 2019-07-01 ASSESSMENT — PAIN SCALES - GENERAL: PAINLEVEL_OUTOF10: 0

## 2019-07-01 NOTE — PROGRESS NOTES
While this writer was speaking with pt, pt reported she did not want to live any longer and started eating part of an envelope. Nursing staff notified. Pt reported \"this world is only for men and that women did not need to be in it\". Pt reported she wanted to divorce her  because he was \"a devil\". SW gave pt encouragement and support. Pt reported her mind \"keeps thinking about her  and things\". SW spoke with pt about things she enjoys doing. Pt and this writer also spoke about keeping busy and doing things like reading a book, coloring, or doing crossword puzzles/word searches. Pt reported she enjoyed coloring. SW and pt began coloring.      Electronically signed by Aric Lopez, [id number] on 7/1/2019 at 12:25 PM

## 2019-07-01 NOTE — PROGRESS NOTES
Iain Cummings is a 48 y.o. female patient. Current Facility-Administered Medications   Medication Dose Route Frequency Provider Last Rate Last Dose    vitamin D (ERGOCALCIFEROL) capsule 50,000 Units  50,000 Units Oral Weekly Janell Keyes MD   50,000 Units at 06/30/19 1435    FLUoxetine (PROZAC) capsule 60 mg  60 mg Oral Daily Kimberly Dudley MD   60 mg at 07/01/19 2677    gabapentin (NEURONTIN) capsule 300 mg  300 mg Oral TID Kimberly Dudley MD   300 mg at 07/01/19 6599    hydrOXYzine (ATARAX) tablet 25 mg  25 mg Oral TID PRN Kimberly Dudley MD   25 mg at 06/30/19 1435    melatonin tablet 3 mg  3 mg Oral Nightly Kimberly Dudley MD   3 mg at 06/30/19 2109    OLANZapine (ZYPREXA) tablet 10 mg  10 mg Oral Nightly Kimberly Dudley MD   10 mg at 06/30/19 2109    traZODone (DESYREL) tablet 50 mg  50 mg Oral Nightly PRN Kimberly Dudley MD        acetaminophen (TYLENOL) tablet 650 mg  650 mg Oral Q4H PRN Kimberly Dudley MD   650 mg at 06/29/19 2204    magnesium hydroxide (MILK OF MAGNESIA) 400 MG/5ML suspension 30 mL  30 mL Oral Daily PRN Kimberly Dudley MD        nicotine (NICODERM CQ) 14 MG/24HR 1 patch  1 patch Transdermal Daily Kimberly Dudley MD   1 patch at 07/01/19 0338    pravastatin (PRAVACHOL) tablet 40 mg  40 mg Oral Nightly Janell Keyes MD   40 mg at 06/30/19 2109     Allergies   Allergen Reactions    Latex Rash    Penicillins Hives     Active Problems:    Suicidal ideations    Depression  Resolved Problems:    * No resolved hospital problems. *    Blood pressure 109/77, pulse 69, temperature 98.2 °F (36.8 °C), resp. rate 18, height 5' 4\" (1.626 m), weight 155 lb (70.3 kg), SpO2 97 %. Subjective:  Symptoms:  Improved. She reports anxiety. Diet:  Adequate intake. Activity level: Normal.    Pain:  She reports no pain. Objective:  General Appearance: In no acute distress.     Vital signs: (most recent): Blood pressure 109/77, pulse 69, temperature

## 2019-07-01 NOTE — PROGRESS NOTES
WRAP UP GROUP NOTE:     Patient's Goal:  Providing feedback as to their own progress in the care-plan provided. Pt's have an opportunity to explore self-reflective skills and share any additional cares and concerns not yet addressed. Pt effectively participated. Energy level:  low  Appetite:  Improving   Concentration:   Improving   Hallucinations:  Gone   Depression:  Improved   Anxiety:  Improved   How I worked today:  Bere Sherwood a little  What helps me sleep:  Try a relaxation exercise  Any questions/complaints/comments:  No   Group/activities that helped me today were:  Self-enhancement; One-to-one.

## 2019-07-01 NOTE — PROGRESS NOTES
Admission Note    Reason for admission/Target Symptom: Patient admitted to [facility] due to \"feelings of depression and homicidal intent\". Diagnoses: Depression, unspecified depression type, Homicidal ideation   UDS: Negative  BAL:  Negative    SW met with treatment team to discuss patient's treatment including care planning, discharge planning, and follow-up needs. Pt has been admitted to [facility]. Treatment team has identified patient's discharge needs as medication management and outpatient therapy/counseling. Pt confirmed  the need for ongoing treatment post inpatient stay. Pt was also provided a handout of contact information for drug and alcohol treatment centers and other community support service such as [facility], AA, and [facility].     Electronically signed by Allen Gauthier on 7/1/2019 at 9:19 AM

## 2019-07-01 NOTE — PROGRESS NOTES
Weekly  FLUoxetine (PROZAC) capsule 60 mg, 60 mg, Oral, Daily  gabapentin (NEURONTIN) capsule 300 mg, 300 mg, Oral, TID  hydrOXYzine (ATARAX) tablet 25 mg, 25 mg, Oral, TID PRN  melatonin tablet 3 mg, 3 mg, Oral, Nightly  OLANZapine (ZYPREXA) tablet 10 mg, 10 mg, Oral, Nightly  traZODone (DESYREL) tablet 50 mg, 50 mg, Oral, Nightly PRN  acetaminophen (TYLENOL) tablet 650 mg, 650 mg, Oral, Q4H PRN  magnesium hydroxide (MILK OF MAGNESIA) 400 MG/5ML suspension 30 mL, 30 mL, Oral, Daily PRN  nicotine (NICODERM CQ) 14 MG/24HR 1 patch, 1 patch, Transdermal, Daily  pravastatin (PRAVACHOL) tablet 40 mg, 40 mg, Oral, Nightly    ASSESSMENT AND PLAN    DSM 5 DIAGNOSIS:  Major depressive disorder, recurrent, severe, without psychotic features  Suicidal ideations      Plan:   1. Psychiatric Medications:   Continue current psychotropic medications as recommended. Start on lithium 300 mg 3 times a day for chronic suicidal thoughts and suicidal plan. The risks, benefits, side effects, indications, contraindications, alternatives and adverse effects of the medications have been discussed with patient. Patient will be on eyesight during the daytime, during the night patient will be monitored by medical staff routinely. 2. Medical Issues:    Continue medical monitoring by Dr. Travis Zuñiga and associates. 3. Disposition:    Discharge patient from the hospital when she will be in stable mental condition and adjustment psychotropic medications will be done.      Amount of time spent with patient:    35 minutes with greater than 50% of the time spent in counseling and collaboration of care

## 2019-07-01 NOTE — PROGRESS NOTES
C-SSRS updated on this date.      Electronically signed by Ivory Duran, [id number] on 7/1/2019 at 9:11 AM

## 2019-07-01 NOTE — BH NOTE
Denies SI today. Denies HI. Denies AVH. Reports she will be safe at the hospital. Says that she does plan to leave  and would go to [facility], but is also worried about her check going to her previous address. Will d/c 1:1 sitter.
Pt. Still attempting to harm self. All bed linen removed from room due to pt. Attempting to choke herself with it. Up and ambulatory with redirection to day area for eye sight. Pt. Took letter she had in her pillow case and then ripped part of the envelope off and preceded to eat it. Atarax 25mg prn, zyprexa 5mg prn  and scheduled lithium given early at this time. Will continue with eyesight at this time.
Pt lost her children in 1997 for being an unfit parent   Was it within the past 6 months: no   Anxiety 1-10:  0  Explain if increased:   Depression 1-10:  7  Explain if increased:   Level of function outside hospital decreased: no   If yes explain: does not have motivation     Psychiatric Hospitalizations: Yes here in May and reports previous admissions to the [facility]    Outpatient Psychiatric Treatment:denies     Family History:    Family history of mental illness: yes     Family members with suicide attempt: no   If yes explain:     Substance Abuse History:     SBIRT Completed: yes    Current ETOH LEVELS: <10    ETOH Usage:     Amount drinking daily: occasional, social use    Date of last drink: a one day this week    Substance/Chemical Abuse/Recreational Drug History:denies        Opiates: It was discussed with pt they would not be receiving opiates unless they were within 3 days post surgery/acute injury. Patient voiced understanding and agreed. Psychiatric Review Of Systems:     Recent Sleep changes: yes   Recent appetite changes: yes   Recent weight changes/Pounds gained (+) or lost (-): yes      Medical History:     Medical Diagnosis/Issues:   CT today in ED:no  Use of 02 or CPAP: no  Ambulatory: yes  Independent or Need assistance with Self Care: no    PCP: JOHN Chatman     Current Medications:   Scheduled Meds: No current facility-administered medications for this encounter.      Current Outpatient Medications:     FLUoxetine (PROZAC) 20 MG capsule, Take 3 capsules by mouth daily, Disp: 30 capsule, Rfl: 1    traZODone (DESYREL) 50 MG tablet, Take 1 tablet by mouth nightly, Disp: 30 tablet, Rfl: 1    melatonin 3 MG TABS tablet, Take 1 tablet by mouth nightly, Disp: 30 tablet, Rfl: 1    lisinopril-hydrochlorothiazide (PRINZIDE;ZESTORETIC) 20-25 MG per tablet, Take 1 tablet by mouth daily, Disp: , Rfl:     ondansetron (ZOFRAN) 4 MG tablet, Take 1 tablet by mouth daily as needed for Nausea or

## 2019-07-02 PROCEDURE — 99233 SBSQ HOSP IP/OBS HIGH 50: CPT | Performed by: PSYCHIATRY & NEUROLOGY

## 2019-07-02 PROCEDURE — 6370000000 HC RX 637 (ALT 250 FOR IP): Performed by: FAMILY MEDICINE

## 2019-07-02 PROCEDURE — 1240000000 HC EMOTIONAL WELLNESS R&B

## 2019-07-02 PROCEDURE — 6370000000 HC RX 637 (ALT 250 FOR IP): Performed by: PSYCHIATRY & NEUROLOGY

## 2019-07-02 RX ADMIN — FLUOXETINE HYDROCHLORIDE 60 MG: 20 CAPSULE ORAL at 08:49

## 2019-07-02 RX ADMIN — LITHIUM CARBONATE 300 MG: 300 CAPSULE, GELATIN COATED ORAL at 08:49

## 2019-07-02 RX ADMIN — LITHIUM CARBONATE 300 MG: 300 CAPSULE, GELATIN COATED ORAL at 12:11

## 2019-07-02 RX ADMIN — OLANZAPINE 5 MG: 5 TABLET, FILM COATED ORAL at 12:11

## 2019-07-02 RX ADMIN — OLANZAPINE 10 MG: 10 TABLET, FILM COATED ORAL at 21:49

## 2019-07-02 RX ADMIN — LITHIUM CARBONATE 300 MG: 300 CAPSULE, GELATIN COATED ORAL at 18:37

## 2019-07-02 RX ADMIN — Medication 3 MG: at 21:49

## 2019-07-02 RX ADMIN — GABAPENTIN 300 MG: 300 CAPSULE ORAL at 08:49

## 2019-07-02 RX ADMIN — HYDROXYZINE HYDROCHLORIDE 25 MG: 25 TABLET, FILM COATED ORAL at 21:50

## 2019-07-02 RX ADMIN — GABAPENTIN 300 MG: 300 CAPSULE ORAL at 14:56

## 2019-07-02 RX ADMIN — PRAVASTATIN SODIUM 40 MG: 20 TABLET ORAL at 21:49

## 2019-07-02 RX ADMIN — GABAPENTIN 300 MG: 300 CAPSULE ORAL at 21:49

## 2019-07-02 ASSESSMENT — PAIN SCALES - GENERAL: PAINLEVEL_OUTOF10: 0
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
## 2019-07-02 NOTE — PROGRESS NOTES
WRAP UP GROUP NOTE:     Patient's Goal:  Providing feedback as to their own progress in the care-plan provided. Pt's have an opportunity to explore self-reflective skills and share any additional cares and concerns not yet addressed. Pt effectively participated. Energy level:  Low   Appetite:  Improving   Concentration:   Improving   Hallucinations:  Gone   Depression:  Improved   Anxiety:  Improved   How I worked today:  Kaleigh Pérez a little  What helps me sleep:  Try a relaxation exercise  Any questions/complaints/comments:  No   Group/activities that helped me today were:  Self- enhancement; One-to-one with staff.

## 2019-07-03 PROCEDURE — 99233 SBSQ HOSP IP/OBS HIGH 50: CPT | Performed by: PSYCHIATRY & NEUROLOGY

## 2019-07-03 PROCEDURE — 6370000000 HC RX 637 (ALT 250 FOR IP): Performed by: PSYCHIATRY & NEUROLOGY

## 2019-07-03 PROCEDURE — 1240000000 HC EMOTIONAL WELLNESS R&B

## 2019-07-03 PROCEDURE — 6370000000 HC RX 637 (ALT 250 FOR IP): Performed by: FAMILY MEDICINE

## 2019-07-03 RX ADMIN — LITHIUM CARBONATE 300 MG: 300 CAPSULE, GELATIN COATED ORAL at 12:26

## 2019-07-03 RX ADMIN — Medication 3 MG: at 21:25

## 2019-07-03 RX ADMIN — LITHIUM CARBONATE 300 MG: 300 CAPSULE, GELATIN COATED ORAL at 09:37

## 2019-07-03 RX ADMIN — GABAPENTIN 300 MG: 300 CAPSULE ORAL at 21:25

## 2019-07-03 RX ADMIN — GABAPENTIN 300 MG: 300 CAPSULE ORAL at 09:36

## 2019-07-03 RX ADMIN — LITHIUM CARBONATE 300 MG: 300 CAPSULE, GELATIN COATED ORAL at 17:46

## 2019-07-03 RX ADMIN — PRAVASTATIN SODIUM 40 MG: 20 TABLET ORAL at 21:25

## 2019-07-03 RX ADMIN — OLANZAPINE 10 MG: 10 TABLET, FILM COATED ORAL at 21:26

## 2019-07-03 RX ADMIN — GABAPENTIN 300 MG: 300 CAPSULE ORAL at 13:39

## 2019-07-03 RX ADMIN — FLUOXETINE HYDROCHLORIDE 60 MG: 20 CAPSULE ORAL at 09:37

## 2019-07-03 NOTE — PROGRESS NOTES
facility for evaluation and patient's admission to those facilities, as patient refused to return back to her home and stay with her . Patient agreed with this plan. OBJECTIVE    Physical  VITALS:  BP (!) 117/92   Pulse 84   Temp 97.7 °F (36.5 °C)   Resp 16   Ht 5' 4\" (1.626 m)   Wt 155 lb (70.3 kg)   LMP  (LMP Unknown)   SpO2 93%   BMI 26.61 kg/m²   TEMPERATURE:  Current - Temp: 97.7 °F (36.5 °C); Max - Temp  Av.3 °F (36.3 °C)  Min: 96.9 °F (36.1 °C)  Max: 97.7 °F (36.5 °C)  RESPIRATIONS RANGE: Resp  Av  Min: 16  Max: 20  PULSE RANGE: Pulse  Av.5  Min: 77  Max: 84  BLOOD PRESSURE RANGE:  Systolic (66HGU), CSX:013 , Min:117 , HAP:069   ; Diastolic (89TTT), POK:53, Min:92, Max:93    Review of Systems: 14 point review of systems is negative    Psychological ROS: positive for depression, anxiety, passive homicidal ideations    Mental Status Examination:    Appearance: Appropriately groomed and in hospital attire. Made good eye contact. Behavior: Anxious, cooperative. Mild psychomotor agitation appreciated. Uses walker for ambulation. Speech: Normal in tone, volume, and quality. No slurring, dysarthria or   pressured speech noted. Mood: \"Anxious, depressed\"   Affect: Mood congruent. Range is full. Thought Process: Appears linear and goal oriented. Thought Content: Patient does not have any current active suicidal and   homicidal ideations. Positive for presence of passive homicidal thoughts. No overt delusions or paranoia appreciated. Perceptions: Seems patient does not have any auditory or visual hallucinations at present time. Patient did not appear to be responding to internal stimuli. No overt psychosis. Orientation: to person, place and situation. Alert. Language: Intact. Fund of information: Intact. Memory: recent and remote appear intact. Impulsivity: Limited. Neurovegitative: Good appetite, good sleep  Insight: Impaired.    Judgment: Impaired. Data  No new lab test results to review    Medications  Current Facility-Administered Medications: lithium capsule 300 mg, 300 mg, Oral, TID WC  hydrOXYzine (ATARAX) tablet 25 mg, 25 mg, Oral, Q4H PRN  OLANZapine (ZYPREXA) tablet 5 mg, 5 mg, Oral, BID PRN  vitamin D (ERGOCALCIFEROL) capsule 50,000 Units, 50,000 Units, Oral, Weekly  FLUoxetine (PROZAC) capsule 60 mg, 60 mg, Oral, Daily  gabapentin (NEURONTIN) capsule 300 mg, 300 mg, Oral, TID  melatonin tablet 3 mg, 3 mg, Oral, Nightly  OLANZapine (ZYPREXA) tablet 10 mg, 10 mg, Oral, Nightly  traZODone (DESYREL) tablet 50 mg, 50 mg, Oral, Nightly PRN  acetaminophen (TYLENOL) tablet 650 mg, 650 mg, Oral, Q4H PRN  magnesium hydroxide (MILK OF MAGNESIA) 400 MG/5ML suspension 30 mL, 30 mL, Oral, Daily PRN  nicotine (NICODERM CQ) 14 MG/24HR 1 patch, 1 patch, Transdermal, Daily  pravastatin (PRAVACHOL) tablet 40 mg, 40 mg, Oral, Nightly    ASSESSMENT AND PLAN    DSM 5 DIAGNOSIS:  Major depressive disorder, recurrent, severe, without psychotic features  Suicidal ideations        Plan:   1. Psychiatric Medications:   Continue current psychotropic medications as recommended.  Patient denies any side effect of currently prescribed medications. No changes with dose of prescribed medications today.        2. Medical Issues:    Continue medical monitoring by Dr. Rhodia Spatz and associates.       3.  Disposition:    Discharge patient from the hospital when she will be in stable mental condition and adjustment psychotropic medications will be done.     Amount of time spent with patient:   35 minutes with greater than 50% of the time spent in counseling and collaboration of care

## 2019-07-03 NOTE — PROGRESS NOTES
WRAP UP GROUP NOTE:     Patient's Goal:  Providing feedback as to their own progress in the care-plan provided. Pt's have an opportunity to explore self-reflective skills and share any additional cares and concerns not yet addressed. Pt effectively participated. Energy level:  Blank   Appetite:  Good   Concentration:  Improving   Hallucinations:  Gone   Depression:  Blank   Anxiety:  Improved   How I worked today:  Melony Reyes a little  What helps me sleep: Take my meds  Any questions/complaints/comments:  Forgive me for the pain and trouble I caused. Group/activities that helped me today were:  Relaxation training.

## 2019-07-04 PROCEDURE — 6370000000 HC RX 637 (ALT 250 FOR IP): Performed by: PSYCHIATRY & NEUROLOGY

## 2019-07-04 PROCEDURE — 6370000000 HC RX 637 (ALT 250 FOR IP): Performed by: FAMILY MEDICINE

## 2019-07-04 PROCEDURE — 99233 SBSQ HOSP IP/OBS HIGH 50: CPT | Performed by: PSYCHIATRY & NEUROLOGY

## 2019-07-04 PROCEDURE — 1240000000 HC EMOTIONAL WELLNESS R&B

## 2019-07-04 RX ADMIN — FLUOXETINE HYDROCHLORIDE 60 MG: 20 CAPSULE ORAL at 08:42

## 2019-07-04 RX ADMIN — OLANZAPINE 10 MG: 10 TABLET, FILM COATED ORAL at 21:19

## 2019-07-04 RX ADMIN — Medication 3 MG: at 21:19

## 2019-07-04 RX ADMIN — GABAPENTIN 300 MG: 300 CAPSULE ORAL at 14:36

## 2019-07-04 RX ADMIN — LITHIUM CARBONATE 300 MG: 300 CAPSULE, GELATIN COATED ORAL at 17:13

## 2019-07-04 RX ADMIN — LITHIUM CARBONATE 300 MG: 300 CAPSULE, GELATIN COATED ORAL at 08:42

## 2019-07-04 RX ADMIN — PRAVASTATIN SODIUM 40 MG: 20 TABLET ORAL at 21:19

## 2019-07-04 RX ADMIN — LITHIUM CARBONATE 300 MG: 300 CAPSULE, GELATIN COATED ORAL at 11:57

## 2019-07-04 RX ADMIN — GABAPENTIN 300 MG: 300 CAPSULE ORAL at 08:42

## 2019-07-04 RX ADMIN — GABAPENTIN 300 MG: 300 CAPSULE ORAL at 21:19

## 2019-07-04 ASSESSMENT — PAIN SCALES - GENERAL: PAINLEVEL_OUTOF10: 0

## 2019-07-04 NOTE — PROGRESS NOTES
Progress Note  Arianna Chan  7/3/2019 11:49 PM  Subjective:   Admit Date:   6/28/2019      CC/ADMIT DX:       Interval History:   Reviewed overnight events and nursing notes. She is doing well and has no new medical complaints. I have reviewed all labs/diagnostics from the last 24hrs. ROS:   I have done a 10 point ROS and all are negative, except what is mentioned in the HPI. DIET GENERAL;    Medications:      lithium  300 mg Oral TID WC    vitamin D  50,000 Units Oral Weekly    FLUoxetine  60 mg Oral Daily    gabapentin  300 mg Oral TID    melatonin  3 mg Oral Nightly    OLANZapine  10 mg Oral Nightly    nicotine  1 patch Transdermal Daily    pravastatin  40 mg Oral Nightly           Objective:   Vitals: /88   Pulse 99   Temp 97.4 °F (36.3 °C) (Temporal)   Resp 18   Ht 5' 4\" (1.626 m)   Wt 155 lb (70.3 kg)   LMP  (LMP Unknown)   SpO2 96%   BMI 26.61 kg/m²  No intake or output data in the 24 hours ending 07/03/19 6649  General appearance: alert and cooperative with exam  Lungs: clear to auscultation bilaterally  Heart: regular rate and rhythm, S1, S2 normal, no murmur, click, rub or gallop  Abdomen: soft, non-tender; bowel sounds normal; no masses,  no organomegaly  Extremities: extremities normal, atraumatic, no cyanosis or edema  Neurologic:  No obvious focal neurologic deficits. Assessment and Plan: Active Problems:    Suicidal ideations    Depression  Resolved Problems:    * No resolved hospital problems. *    Vit D Def    HPL    Plan:  1. Continue present medication(s)   2. She is medically stable. I will monitor for any changes or new concerns. 3.  Follow with Psych      Discharge planning:   her home     Reviewed treatment plans with the patient and/or family.              Electronically signed by Jaya Domingo MD on 7/3/2019 at 11:49 PM

## 2019-07-04 NOTE — PROGRESS NOTES
155 lb (70.3 kg)   LMP  (LMP Unknown)   SpO2 94%   BMI 26.61 kg/m²   TEMPERATURE:  Current - Temp: 97.9 °F (36.6 °C); Max - Temp  Av.7 °F (36.5 °C)  Min: 97.4 °F (36.3 °C)  Max: 97.9 °F (36.6 °C)  RESPIRATIONS RANGE: Resp  Av  Min: 18  Max: 18  PULSE RANGE: Pulse  Av  Min: 99  Max: 109  BLOOD PRESSURE RANGE:  Systolic (15BYM), ZJB:293 , Min:120 , IAT:716   ; Diastolic (07UVQ), KSI:72, Min:88, Max:88    Review of Systems: 14 point review of systems is negative    Psychological ROS: positive for mild anxiety and depression. Mental Status Examination:    Appearance: Appropriately groomed and in hospital attire. Made intermittent eye   contact. Behavior: Calm, cooperative, friendly, and socially appropriate. No psychomotor retardation/agitation appreciated. Uses walker for ambulation. Speech: Normal in tone, volume, and quality. No slurring, dysarthria or   pressured speech noted. Mood: \"very good\"   Affect: Mood congruent. Range is full. Thought Process: Appears linear and goal oriented. Thought Content: Patient does not have any current active suicidal and   homicidal ideations. No overt delusions or paranoia appreciated. Perceptions: Seems patient does not have any auditory or visual hallucinations at present time. Patient did not appear to be responding to internal stimuli. No overt psychosis. Orientation: to person, place, date, and situation. Alert. Language: Intact. Fund of information: Intact. Memory: recent and remote appear intact. Impulsivity: Improved. Neurovegitative: Good appetite, good quality of sleep  Insight: Present. Judgment: Improved.     Data  No new lab test results to review    Medications  Current Facility-Administered Medications: lithium capsule 300 mg, 300 mg, Oral, TID WC  hydrOXYzine (ATARAX) tablet 25 mg, 25 mg, Oral, Q4H PRN  OLANZapine (ZYPREXA) tablet 5 mg, 5 mg, Oral, BID PRN  vitamin D (ERGOCALCIFEROL) capsule 50,000 Units, 50,000 Units, Oral, Weekly  FLUoxetine (PROZAC) capsule 60 mg, 60 mg, Oral, Daily  gabapentin (NEURONTIN) capsule 300 mg, 300 mg, Oral, TID  melatonin tablet 3 mg, 3 mg, Oral, Nightly  OLANZapine (ZYPREXA) tablet 10 mg, 10 mg, Oral, Nightly  traZODone (DESYREL) tablet 50 mg, 50 mg, Oral, Nightly PRN  acetaminophen (TYLENOL) tablet 650 mg, 650 mg, Oral, Q4H PRN  magnesium hydroxide (MILK OF MAGNESIA) 400 MG/5ML suspension 30 mL, 30 mL, Oral, Daily PRN  nicotine (NICODERM CQ) 14 MG/24HR 1 patch, 1 patch, Transdermal, Daily  pravastatin (PRAVACHOL) tablet 40 mg, 40 mg, Oral, Nightly    ASSESSMENT AND PLAN    DSM 5 DIAGNOSIS:  Major depressive disorder, recurrent, severe, without psychotic features  Suicidal ideations        Plan:   1. Psychiatric Medications:   Continue current psychotropic medications as recommended.  Patient denies any side effect of currently prescribed medications. No changes with dose of prescribed medications today.        2. Medical Issues:    Continue medical monitoring by Dr. Laith Bauman and associates.       3. Disposition:    Discharge patient from the hospital tomorrow on 07/05/19.   Disposition plan is not made yet, patient can be discharged to assisted living facility if she will be accepted or to homeless shelter.     Amount of time spent with patient:   35 minutes with greater than 50% of the time spent in counseling and collaboration of care

## 2019-07-05 VITALS
BODY MASS INDEX: 26.46 KG/M2 | SYSTOLIC BLOOD PRESSURE: 121 MMHG | WEIGHT: 155 LBS | TEMPERATURE: 98.9 F | HEART RATE: 119 BPM | RESPIRATION RATE: 18 BRPM | HEIGHT: 64 IN | OXYGEN SATURATION: 98 % | DIASTOLIC BLOOD PRESSURE: 69 MMHG

## 2019-07-05 PROCEDURE — 6370000000 HC RX 637 (ALT 250 FOR IP): Performed by: PSYCHIATRY & NEUROLOGY

## 2019-07-05 PROCEDURE — 5130000000 HC BRIDGE APPOINTMENT

## 2019-07-05 PROCEDURE — 99239 HOSP IP/OBS DSCHRG MGMT >30: CPT | Performed by: PSYCHIATRY & NEUROLOGY

## 2019-07-05 RX ORDER — LITHIUM CARBONATE 300 MG/1
300 CAPSULE ORAL
Qty: 90 CAPSULE | Refills: 1 | Status: ON HOLD | OUTPATIENT
Start: 2019-07-05 | End: 2020-04-27 | Stop reason: HOSPADM

## 2019-07-05 RX ORDER — NICOTINE 21 MG/24HR
1 PATCH, TRANSDERMAL 24 HOURS TRANSDERMAL DAILY
Qty: 30 PATCH | Refills: 3 | Status: ON HOLD | OUTPATIENT
Start: 2019-07-06 | End: 2020-04-27 | Stop reason: HOSPADM

## 2019-07-05 RX ORDER — HYDROXYZINE HYDROCHLORIDE 25 MG/1
25 TABLET, FILM COATED ORAL EVERY 6 HOURS PRN
Qty: 120 TABLET | Refills: 0 | Status: SHIPPED | OUTPATIENT
Start: 2019-07-05 | End: 2019-07-15

## 2019-07-05 RX ORDER — OLANZAPINE 10 MG/1
10 TABLET ORAL NIGHTLY
Qty: 30 TABLET | Refills: 1 | Status: ON HOLD | OUTPATIENT
Start: 2019-07-05 | End: 2020-04-27 | Stop reason: HOSPADM

## 2019-07-05 RX ADMIN — GABAPENTIN 300 MG: 300 CAPSULE ORAL at 09:03

## 2019-07-05 RX ADMIN — LITHIUM CARBONATE 300 MG: 300 CAPSULE, GELATIN COATED ORAL at 12:09

## 2019-07-05 RX ADMIN — LITHIUM CARBONATE 300 MG: 300 CAPSULE, GELATIN COATED ORAL at 09:02

## 2019-07-05 RX ADMIN — FLUOXETINE HYDROCHLORIDE 60 MG: 20 CAPSULE ORAL at 09:02

## 2019-07-05 ASSESSMENT — PAIN SCALES - GENERAL: PAINLEVEL_OUTOF10: 0

## 2019-07-05 NOTE — PROGRESS NOTES
SW met with treatment team to discuss pt's progress and setbacks. SW 2 was present. Pt reportedly slept well last night, with medications, appetite is good, attends scheduled group activities, minimally social with peers/staff, performs ADL's, compliant with medications, behavior has been cooperative, denies depression/anxiety, denies SI/HI/AVH, will be discharged today to [facility], follow-up appointments will be scheduled.

## 2019-07-05 NOTE — PLAN OF CARE
Patient has remained free of self harm this shift.  Electronically signed by Renetta Ward RN on 6/29/2019 at 10:58 AM
Problem: Depressive Behavior With or Without Suicide Precautions:  Goal: Able to verbalize acceptance of life and situations over which he or she has no control  Description  Able to verbalize acceptance of life and situations over which he or she has no control  7/1/2019 1359 by Fabiola Vallejo  Outcome: Ongoing  Note:                                                                       Group Therapy Note    Date: 7/1/2019  Start Time: 1300  End Time:  1345  Number of Participants: 3    Type of Group: Psychoeducation    Wellness Binder Information  Module Name:  Luz Zambrano Nachojose luis Number:  [id number]    Patient's Goal:  To raise awareness of strategies to enhance happiness    Notes:  Pt demonstrated improved awareness of strategies to enhance happiness by actively participating in group discussion.     Status After Intervention:  Unchanged    Participation Level: Interactive    Participation Quality: Attentive      Speech:  normal      Thought Process/Content: Logical      Affective Functioning: Congruent      Mood: anxious and depressed      Level of consciousness:  Alert and Oriented x4      Response to Learning: Able to verbalize current knowledge/experience, Able to verbalize/acknowledge new learning and Progressing to goal      Endings: None Reported    Modes of Intervention: Education      Discipline Responsible: Psychoeducational Specialist      Signature:  Fabiola Vallejo
Ongoing     Problem: Safety:  Goal: Ability to contract for his/her safety will improve  Description  Ability to contract for his/her safety will improve  Outcome: Ongoing
strategies to use when suicidal feelings occur will improve  Description  Ability to verbalize adaptive coping strategies to use when suicidal feelings occur will improve  7/5/2019 1120 by Senthil Stone LPN  Outcome: Completed  7/4/2019 2304 by Abdirahman Beltran RN  Outcome: Ongoing  Goal: Ability to verbalize adaptive coping strategies to use when the urge to self-mutilate occurs will improve  Description  Ability to verbalize adaptive coping strategies to use when the urge to self-mutilate occurs will improve  7/5/2019 1120 by Senthil Stone LPN  Outcome: Completed  7/4/2019 2304 by Abdirahman Beltrna RN  Outcome: Ongoing     Problem: Safety:  Goal: Ability to contract for his/her safety will improve  Description  Ability to contract for his/her safety will improve  7/5/2019 1120 by Senthil Stone LPN  Outcome: Completed  7/4/2019 2304 by Abdirahman Beltran RN  Outcome: Ongoing     Problem: Falls - Risk of:  Goal: Will remain free from falls  Description  Will remain free from falls  7/5/2019 1120 by Senthil Stone LPN  Outcome: Completed  7/4/2019 2304 by Abdirahman Beltran RN  Outcome: Ongoing  Goal: Absence of physical injury  Description  Absence of physical injury  7/5/2019 1120 by Senthil Stone LPN  Outcome: Completed  7/4/2019 2304 by Abdirahman Beltran RN  Outcome: Ongoing

## 2019-07-05 NOTE — PROGRESS NOTES
Progress Note  Abel Escoto  7/5/2019 12:50 AM  Subjective:   Admit Date:   6/28/2019      CC/ADMIT DX:       Interval History:   Reviewed overnight events and nursing notes. She has no physical complaints. I have reviewed all labs/diagnostics from the last 24hrs. ROS:   I have done a 10 point ROS and all are negative, except what is mentioned in the HPI. DIET GENERAL;    Medications:      lithium  300 mg Oral TID WC    vitamin D  50,000 Units Oral Weekly    FLUoxetine  60 mg Oral Daily    gabapentin  300 mg Oral TID    melatonin  3 mg Oral Nightly    OLANZapine  10 mg Oral Nightly    nicotine  1 patch Transdermal Daily    pravastatin  40 mg Oral Nightly           Objective:   Vitals: BP 98/69   Pulse 99   Temp 97.8 °F (36.6 °C) (Temporal)   Resp 18   Ht 5' 4\" (1.626 m)   Wt 155 lb (70.3 kg)   LMP  (LMP Unknown)   SpO2 93%   BMI 26.61 kg/m²  No intake or output data in the 24 hours ending 07/05/19 0050  General appearance: alert and cooperative with exam  Lungs: clear to auscultation bilaterally  Heart: regular rate and rhythm, S1, S2 normal, no murmur, click, rub or gallop  Abdomen: soft, non-tender; bowel sounds normal; no masses,  no organomegaly  Extremities: extremities normal, atraumatic, no cyanosis or edema  Neurologic:  No obvious focal neurologic deficits. Assessment and Plan: Active Problems:    Suicidal ideations    Depression  Resolved Problems:    * No resolved hospital problems. *    Vit D Def    HPL    Plan:  1. Continue present medication(s)   2. She remains medically stable. I will monitor for any changes or new concerns. 3.  Follow with Psych      Discharge planning:   her home     Reviewed treatment plans with the patient and/or family.              Electronically signed by Kaylee Cooley MD on 7/5/2019 at 12:50 AM

## 2019-07-06 NOTE — PROGRESS NOTES
Progress Note  Bulmaro Rodriguez  7/6/2019 4:24 AM  Subjective:   Admit Date:   6/28/2019      CC/ADMIT DX:       Interval History:   Reviewed overnight events and nursing notes. She denies any medical complaints. I have reviewed all labs/diagnostics from the last 24hrs. ROS:   I have done a 10 point ROS and all are negative, except what is mentioned in the HPI. No diet orders on file    Medications:             Objective:   Vitals: /69   Pulse 119   Temp 98.9 °F (37.2 °C) (Temporal)   Resp 18   Ht 5' 4\" (1.626 m)   Wt 155 lb (70.3 kg)   LMP  (LMP Unknown)   SpO2 98%   BMI 26.61 kg/m²  No intake or output data in the 24 hours ending 07/06/19 0424  General appearance: alert and cooperative with exam  Lungs: clear to auscultation bilaterally  Heart: regular rate and rhythm, S1, S2 normal, no murmur, click, rub or gallop  Abdomen: soft, non-tender; bowel sounds normal; no masses,  no organomegaly  Extremities: extremities normal, atraumatic, no cyanosis or edema  Neurologic:  No obvious focal neurologic deficits. Assessment and Plan: Active Problems:    Suicidal ideations    Depression  Resolved Problems:    * No resolved hospital problems. *    Vit D Def    HPL    Plan:  1. Continue present medication(s)   2. She is medically stable. I will monitor for any changes or new concerns. 3.  Follow with Psych      Discharge planning:   her home     Reviewed treatment plans with the patient and/or family.              Electronically signed by Chico Snowden MD on 7/6/2019 at 4:24 AM

## 2019-07-30 LAB
ANION GAP SERPL CALCULATED.3IONS-SCNC: 13 MMOL/L (ref 7–19)
BUN BLDV-MCNC: 16 MG/DL (ref 6–20)
CALCIUM SERPL-MCNC: 10.2 MG/DL (ref 8.6–10)
CHLORIDE BLD-SCNC: 107 MMOL/L (ref 98–111)
CO2: 25 MMOL/L (ref 22–29)
CREAT SERPL-MCNC: 0.8 MG/DL (ref 0.5–0.9)
GFR NON-AFRICAN AMERICAN: >60
GLUCOSE BLD-MCNC: 105 MG/DL (ref 74–109)
LITHIUM LEVEL: 0.5 MMOL/L (ref 0.6–1.2)
POTASSIUM SERPL-SCNC: 4.6 MMOL/L (ref 3.5–5)
SODIUM BLD-SCNC: 145 MMOL/L (ref 136–145)
T4 FREE: 0.8 NG/DL (ref 0.9–1.7)
TSH SERPL DL<=0.05 MIU/L-ACNC: 2.16 UIU/ML (ref 0.27–4.2)

## 2019-09-11 ENCOUNTER — TELEPHONE (OUTPATIENT)
Dept: CARDIOLOGY | Age: 54
End: 2019-09-11

## 2019-09-11 NOTE — TELEPHONE ENCOUNTER
No Number; The number on the pacemaker chart is invalid; Pt is no longer there;  Unable to remind pt to send pacemaker reading

## 2019-10-17 ENCOUNTER — TELEPHONE (OUTPATIENT)
Dept: CARDIOLOGY | Age: 54
End: 2019-10-17

## 2019-12-06 ENCOUNTER — TELEPHONE (OUTPATIENT)
Dept: CARDIOLOGY | Age: 54
End: 2019-12-06

## 2019-12-18 DIAGNOSIS — Z95.0 PACEMAKER: Primary | ICD-10-CM

## 2019-12-18 DIAGNOSIS — I49.5 SINOATRIAL NODE DYSFUNCTION (HCC): ICD-10-CM

## 2019-12-18 PROCEDURE — 93296 REM INTERROG EVL PM/IDS: CPT | Performed by: CLINICAL NURSE SPECIALIST

## 2019-12-18 PROCEDURE — 93294 REM INTERROG EVL PM/LDLS PM: CPT | Performed by: CLINICAL NURSE SPECIALIST

## 2020-03-19 PROCEDURE — 93294 REM INTERROG EVL PM/LDLS PM: CPT | Performed by: CLINICAL NURSE SPECIALIST

## 2020-03-19 PROCEDURE — 93296 REM INTERROG EVL PM/IDS: CPT | Performed by: CLINICAL NURSE SPECIALIST

## 2020-03-23 ENCOUNTER — TELEPHONE (OUTPATIENT)
Dept: CARDIOLOGY | Age: 55
End: 2020-03-23

## 2020-04-20 ENCOUNTER — HOSPITAL ENCOUNTER (INPATIENT)
Age: 55
LOS: 6 days | Discharge: HOME | DRG: 885 | End: 2020-04-27
Attending: PEDIATRICS | Admitting: PSYCHIATRY & NEUROLOGY
Payer: MEDICARE

## 2020-04-20 ENCOUNTER — APPOINTMENT (OUTPATIENT)
Dept: CT IMAGING | Age: 55
DRG: 885 | End: 2020-04-20
Payer: MEDICARE

## 2020-04-20 LAB
ALBUMIN SERPL-MCNC: 4.4 G/DL (ref 3.5–5.2)
ALP BLD-CCNC: 82 U/L (ref 35–104)
ALT SERPL-CCNC: 13 U/L (ref 5–33)
AMPHETAMINE SCREEN, URINE: NEGATIVE
ANION GAP SERPL CALCULATED.3IONS-SCNC: 15 MMOL/L (ref 7–19)
AST SERPL-CCNC: 12 U/L (ref 5–32)
BACTERIA: NEGATIVE /HPF
BARBITURATE SCREEN URINE: NEGATIVE
BASOPHILS ABSOLUTE: 0.1 K/UL (ref 0–0.2)
BASOPHILS RELATIVE PERCENT: 0.6 % (ref 0–1)
BENZODIAZEPINE SCREEN, URINE: NEGATIVE
BILIRUB SERPL-MCNC: <0.2 MG/DL (ref 0.2–1.2)
BILIRUBIN URINE: NEGATIVE
BLOOD, URINE: NEGATIVE
BUN BLDV-MCNC: 9 MG/DL (ref 6–20)
CALCIUM SERPL-MCNC: 9.5 MG/DL (ref 8.6–10)
CANNABINOID SCREEN URINE: NEGATIVE
CHLORIDE BLD-SCNC: 101 MMOL/L (ref 98–111)
CLARITY: CLEAR
CO2: 23 MMOL/L (ref 22–29)
COCAINE METABOLITE SCREEN URINE: NEGATIVE
COLOR: YELLOW
CREAT SERPL-MCNC: 0.7 MG/DL (ref 0.5–0.9)
EOSINOPHILS ABSOLUTE: 0.2 K/UL (ref 0–0.6)
EOSINOPHILS RELATIVE PERCENT: 2.6 % (ref 0–5)
EPITHELIAL CELLS, UA: 4 /HPF (ref 0–5)
ETHANOL: <10 MG/DL (ref 0–0.08)
GFR NON-AFRICAN AMERICAN: >60
GLUCOSE BLD-MCNC: 104 MG/DL (ref 74–109)
GLUCOSE URINE: NEGATIVE MG/DL
HCT VFR BLD CALC: 39.3 % (ref 37–47)
HEMOGLOBIN: 13.3 G/DL (ref 12–16)
HYALINE CASTS: 1 /HPF (ref 0–8)
IMMATURE GRANULOCYTES #: 0 K/UL
KETONES, URINE: NEGATIVE MG/DL
LEUKOCYTE ESTERASE, URINE: ABNORMAL
LITHIUM LEVEL: 0.1 MMOL/L (ref 0.6–1.2)
LYMPHOCYTES ABSOLUTE: 2.9 K/UL (ref 1.1–4.5)
LYMPHOCYTES RELATIVE PERCENT: 34.4 % (ref 20–40)
Lab: NORMAL
MCH RBC QN AUTO: 32.2 PG (ref 27–31)
MCHC RBC AUTO-ENTMCNC: 33.8 G/DL (ref 33–37)
MCV RBC AUTO: 95.2 FL (ref 81–99)
MONOCYTES ABSOLUTE: 0.6 K/UL (ref 0–0.9)
MONOCYTES RELATIVE PERCENT: 6.9 % (ref 0–10)
NEUTROPHILS ABSOLUTE: 4.7 K/UL (ref 1.5–7.5)
NEUTROPHILS RELATIVE PERCENT: 55.3 % (ref 50–65)
NITRITE, URINE: NEGATIVE
OPIATE SCREEN URINE: NEGATIVE
PDW BLD-RTO: 15.5 % (ref 11.5–14.5)
PH UA: 6 (ref 5–8)
PLATELET # BLD: 220 K/UL (ref 130–400)
PMV BLD AUTO: 9.9 FL (ref 9.4–12.3)
POTASSIUM SERPL-SCNC: 3.6 MMOL/L (ref 3.5–5)
PROTEIN UA: NEGATIVE MG/DL
RBC # BLD: 4.13 M/UL (ref 4.2–5.4)
RBC UA: 1 /HPF (ref 0–4)
SODIUM BLD-SCNC: 139 MMOL/L (ref 136–145)
SPECIFIC GRAVITY UA: 1.01 (ref 1–1.03)
TOTAL PROTEIN: 7.1 G/DL (ref 6.6–8.7)
URINE REFLEX TO CULTURE: YES
UROBILINOGEN, URINE: 0.2 E.U./DL
WBC # BLD: 8.5 K/UL (ref 4.8–10.8)
WBC UA: 5 /HPF (ref 0–5)

## 2020-04-20 PROCEDURE — 81001 URINALYSIS AUTO W/SCOPE: CPT

## 2020-04-20 PROCEDURE — 80053 COMPREHEN METABOLIC PANEL: CPT

## 2020-04-20 PROCEDURE — 80178 ASSAY OF LITHIUM: CPT

## 2020-04-20 PROCEDURE — 36415 COLL VENOUS BLD VENIPUNCTURE: CPT

## 2020-04-20 PROCEDURE — G0480 DRUG TEST DEF 1-7 CLASSES: HCPCS

## 2020-04-20 PROCEDURE — 70450 CT HEAD/BRAIN W/O DYE: CPT

## 2020-04-20 PROCEDURE — 85025 COMPLETE CBC W/AUTO DIFF WBC: CPT

## 2020-04-20 PROCEDURE — 80307 DRUG TEST PRSMV CHEM ANLYZR: CPT

## 2020-04-20 PROCEDURE — 87086 URINE CULTURE/COLONY COUNT: CPT

## 2020-04-20 PROCEDURE — 99284 EMERGENCY DEPT VISIT MOD MDM: CPT

## 2020-04-20 RX ORDER — ACETAMINOPHEN 325 MG/1
650 TABLET ORAL ONCE
Status: COMPLETED | OUTPATIENT
Start: 2020-04-20 | End: 2020-04-21

## 2020-04-20 ASSESSMENT — PAIN SCALES - GENERAL: PAINLEVEL_OUTOF10: 5

## 2020-04-21 PROBLEM — F32.A DEPRESSION, ACUTE: Status: ACTIVE | Noted: 2020-04-21

## 2020-04-21 PROCEDURE — 6370000000 HC RX 637 (ALT 250 FOR IP): Performed by: PSYCHIATRY & NEUROLOGY

## 2020-04-21 PROCEDURE — 1240000000 HC EMOTIONAL WELLNESS R&B

## 2020-04-21 PROCEDURE — 99223 1ST HOSP IP/OBS HIGH 75: CPT | Performed by: PSYCHIATRY & NEUROLOGY

## 2020-04-21 PROCEDURE — 6370000000 HC RX 637 (ALT 250 FOR IP): Performed by: FAMILY MEDICINE

## 2020-04-21 PROCEDURE — 6370000000 HC RX 637 (ALT 250 FOR IP): Performed by: PEDIATRICS

## 2020-04-21 RX ORDER — LISINOPRIL 20 MG/1
20 TABLET ORAL DAILY
Status: DISCONTINUED | OUTPATIENT
Start: 2020-04-21 | End: 2020-04-24

## 2020-04-21 RX ORDER — HYDROCHLOROTHIAZIDE 25 MG/1
25 TABLET ORAL DAILY
Status: DISCONTINUED | OUTPATIENT
Start: 2020-04-21 | End: 2020-04-24

## 2020-04-21 RX ORDER — ACETAMINOPHEN 325 MG/1
650 TABLET ORAL EVERY 4 HOURS PRN
Status: DISCONTINUED | OUTPATIENT
Start: 2020-04-21 | End: 2020-04-21

## 2020-04-21 RX ORDER — HYDROXYZINE HYDROCHLORIDE 25 MG/1
25 TABLET, FILM COATED ORAL 3 TIMES DAILY PRN
Status: DISCONTINUED | OUTPATIENT
Start: 2020-04-21 | End: 2020-04-27 | Stop reason: HOSPADM

## 2020-04-21 RX ORDER — PRAVASTATIN SODIUM 20 MG
40 TABLET ORAL NIGHTLY
Status: DISCONTINUED | OUTPATIENT
Start: 2020-04-21 | End: 2020-04-27 | Stop reason: HOSPADM

## 2020-04-21 RX ORDER — TRAZODONE HYDROCHLORIDE 50 MG/1
50 TABLET ORAL NIGHTLY PRN
Status: DISCONTINUED | OUTPATIENT
Start: 2020-04-21 | End: 2020-04-27 | Stop reason: HOSPADM

## 2020-04-21 RX ORDER — TRAZODONE HYDROCHLORIDE 50 MG/1
50 TABLET ORAL NIGHTLY
Status: DISCONTINUED | OUTPATIENT
Start: 2020-04-21 | End: 2020-04-21

## 2020-04-21 RX ORDER — LISINOPRIL AND HYDROCHLOROTHIAZIDE 25; 20 MG/1; MG/1
1 TABLET ORAL DAILY
Status: DISCONTINUED | OUTPATIENT
Start: 2020-04-21 | End: 2020-04-21 | Stop reason: CLARIF

## 2020-04-21 RX ORDER — MIRTAZAPINE 7.5 MG/1
7.5 TABLET, FILM COATED ORAL NIGHTLY
Status: DISCONTINUED | OUTPATIENT
Start: 2020-04-21 | End: 2020-04-27 | Stop reason: HOSPADM

## 2020-04-21 RX ORDER — CLONIDINE HYDROCHLORIDE 0.1 MG/1
0.1 TABLET ORAL EVERY 6 HOURS PRN
Status: DISCONTINUED | OUTPATIENT
Start: 2020-04-21 | End: 2020-04-27 | Stop reason: HOSPADM

## 2020-04-21 RX ORDER — POLYETHYLENE GLYCOL 3350 17 G/17G
17 POWDER, FOR SOLUTION ORAL DAILY PRN
Status: DISCONTINUED | OUTPATIENT
Start: 2020-04-21 | End: 2020-04-27 | Stop reason: HOSPADM

## 2020-04-21 RX ORDER — LANOLIN ALCOHOL/MO/W.PET/CERES
3 CREAM (GRAM) TOPICAL NIGHTLY PRN
Status: DISCONTINUED | OUTPATIENT
Start: 2020-04-21 | End: 2020-04-27 | Stop reason: HOSPADM

## 2020-04-21 RX ORDER — ACETAMINOPHEN 325 MG/1
650 TABLET ORAL EVERY 4 HOURS PRN
Status: DISCONTINUED | OUTPATIENT
Start: 2020-04-21 | End: 2020-04-27 | Stop reason: HOSPADM

## 2020-04-21 RX ADMIN — LISINOPRIL 20 MG: 20 TABLET ORAL at 10:46

## 2020-04-21 RX ADMIN — ACETAMINOPHEN 650 MG: 325 TABLET, FILM COATED ORAL at 20:48

## 2020-04-21 RX ADMIN — TRAZODONE HYDROCHLORIDE 50 MG: 50 TABLET ORAL at 20:48

## 2020-04-21 RX ADMIN — PRAVASTATIN SODIUM 40 MG: 20 TABLET ORAL at 20:48

## 2020-04-21 RX ADMIN — CLONIDINE HYDROCHLORIDE 0.1 MG: 0.1 TABLET ORAL at 01:25

## 2020-04-21 RX ADMIN — METOPROLOL TARTRATE 25 MG: 25 TABLET, FILM COATED ORAL at 14:04

## 2020-04-21 RX ADMIN — ACETAMINOPHEN 650 MG: 325 TABLET ORAL at 00:21

## 2020-04-21 RX ADMIN — MELATONIN 3 MG: at 01:25

## 2020-04-21 RX ADMIN — ACETAMINOPHEN 650 MG: 325 TABLET, FILM COATED ORAL at 10:46

## 2020-04-21 RX ADMIN — METOPROLOL TARTRATE 25 MG: 25 TABLET, FILM COATED ORAL at 20:48

## 2020-04-21 RX ADMIN — MIRTAZAPINE 7.5 MG: 7.5 TABLET ORAL at 20:48

## 2020-04-21 RX ADMIN — HYDROCHLOROTHIAZIDE 25 MG: 25 TABLET ORAL at 10:46

## 2020-04-21 RX ADMIN — MELATONIN 3 MG: at 20:48

## 2020-04-21 RX ADMIN — TRAZODONE HYDROCHLORIDE 50 MG: 50 TABLET ORAL at 01:25

## 2020-04-21 ASSESSMENT — SLEEP AND FATIGUE QUESTIONNAIRES
DIFFICULTY FALLING ASLEEP: YES
DIFFICULTY ARISING: NO
SLEEP PATTERN: DIFFICULTY FALLING ASLEEP;RESTLESSNESS
DIFFICULTY STAYING ASLEEP: YES
AVERAGE NUMBER OF SLEEP HOURS: 2
RESTFUL SLEEP: NO
DO YOU HAVE DIFFICULTY SLEEPING: YES

## 2020-04-21 ASSESSMENT — PAIN DESCRIPTION - LOCATION: LOCATION: HEAD

## 2020-04-21 ASSESSMENT — ENCOUNTER SYMPTOMS
NAUSEA: 0
COUGH: 0
SHORTNESS OF BREATH: 0
VOMITING: 0
ABDOMINAL PAIN: 0
BACK PAIN: 0
RHINORRHEA: 0
EYE DISCHARGE: 0
COLOR CHANGE: 0

## 2020-04-21 ASSESSMENT — PAIN DESCRIPTION - PROGRESSION: CLINICAL_PROGRESSION: NOT CHANGED

## 2020-04-21 ASSESSMENT — PAIN SCALES - GENERAL
PAINLEVEL_OUTOF10: 6
PAINLEVEL_OUTOF10: 0
PAINLEVEL_OUTOF10: 0
PAINLEVEL_OUTOF10: 5
PAINLEVEL_OUTOF10: 3
PAINLEVEL_OUTOF10: 2

## 2020-04-21 ASSESSMENT — LIFESTYLE VARIABLES: HISTORY_ALCOHOL_USE: NO

## 2020-04-21 ASSESSMENT — PAIN DESCRIPTION - FREQUENCY: FREQUENCY: INTERMITTENT

## 2020-04-21 ASSESSMENT — PAIN DESCRIPTION - PAIN TYPE: TYPE: ACUTE PAIN

## 2020-04-21 ASSESSMENT — PAIN DESCRIPTION - ONSET: ONSET: GRADUAL

## 2020-04-21 ASSESSMENT — PATIENT HEALTH QUESTIONNAIRE - PHQ9: SUM OF ALL RESPONSES TO PHQ QUESTIONS 1-9: 18

## 2020-04-21 ASSESSMENT — PAIN - FUNCTIONAL ASSESSMENT: PAIN_FUNCTIONAL_ASSESSMENT: ACTIVITIES ARE NOT PREVENTED

## 2020-04-21 ASSESSMENT — PAIN DESCRIPTION - ORIENTATION: ORIENTATION: OTHER (COMMENT)

## 2020-04-21 ASSESSMENT — PAIN DESCRIPTION - DESCRIPTORS: DESCRIPTORS: HEADACHE

## 2020-04-21 NOTE — ED NOTES
Bed: 16  Expected date:   Expected time:   Means of arrival:   Comments:  [address] [facility] [address] [facility]  04/20/20 2018

## 2020-04-21 NOTE — H&P
HISTORY and PHYSICAL      CHIEF COMPLAINT:  Depression, SI    Reason for Admission:  Depression, SI    History Obtained From:  patient    HISTORY OF PRESENT ILLNESS:      The patient is a 50 y.o. female who is admitted to the [place] [address] unit with worsening mood issues. She has no c/o CP or SOA. No abdominal pain or N/V. No dysuria. No weakness or HA. No new pain issues. No fevers.      Past Medical History:        Diagnosis Date    Anxiety     Arthritis     Cryptogenic stroke ([address] 12/2017    X 2 WITH LOOP RECORDER PUT IN 2018    Depression     Ganglia     Hypertension     Memory loss     Mixed hyperlipidemia 3/13/2018    Pacemaker 2019    Status post placement of implantable loop recorder 2018     Past Surgical History:        Procedure Laterality Date     SECTION      x2    CHOLECYSTECTOMY      COSMETIC SURGERY      HAND SURGERY      INSERTABLE CARDIAC MONITOR  2018    NV ERIN VIERA JT W ANESTHESIA Left 12/3/2018    SHOULDER MANIPULATION UNDER ANESTHESIA WITH CORTICOSTEROID INJECTION performed by Jacob Barnes MD at [address] [place] [address] OR         Medications Prior to Admission:    Medications Prior to Admission: metoprolol tartrate (LOPRESSOR) 25 MG tablet, Take 1 tablet by mouth 2 times daily  OLANZapine (ZYPREXA) 10 MG tablet, Take 1 tablet by mouth nightly  lithium 300 MG capsule, Take 1 capsule by mouth 3 times daily (with meals)  nicotine (NICODERM CQ) 14 MG/24HR, Place 1 patch onto the skin daily  FLUoxetine (PROZAC) 20 MG capsule, Take 3 capsules by mouth daily  traZODone (DESYREL) 50 MG tablet, Take 1 tablet by mouth nightly  melatonin 3 MG TABS tablet, Take 1 tablet by mouth nightly  lisinopril-hydrochlorothiazide (PRINZIDE;ZESTORETIC) 20-25 MG per tablet, Take 1 tablet by mouth daily  ondansetron (ZOFRAN) 4 MG tablet, Take 1 tablet by mouth daily as needed for Nausea or Vomiting  gabapentin (NEURONTIN) 300 MG

## 2020-04-21 NOTE — PROGRESS NOTES
Call placed to Dr. Alok Gibson in regard to pt's BP. Informed Dr. Alok Gibson of pt's two most recent BP's manually taken that were 152/110 and 162/118. MD ordered Clonidine 0.1mg PO Q6 PRN. Monitoring for safety continues.      Electronically signed by Nestora Simmonds, RN on 4/21/2020 at 1:10 AM
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
## 2020-04-21 NOTE — BH NOTE
Current Support System:  none  Employment:   disability    Disposition:       1. Decision to admit to [facility]:   yes    If yes, which unit Adult or Geriatric Unit:  Geriatric  Is patient voluntary: yes  If no has a 72 hold been initiated:   Admission Diagnosis: Depression    Does the patient have a guardian or Medical POA:   Has the guardian been notified or Medical POA:       2. Decision to Discharge:   Does not meet criteria for acceptance to   unit due to:     3. Transferred:       Patient was transferred due to:      Other follow up information provided:      Karina Govea RN

## 2020-04-22 LAB
CHOLESTEROL, TOTAL: 231 MG/DL (ref 160–199)
HBA1C MFR BLD: 5.5 % (ref 4–6)
HDLC SERPL-MCNC: 57 MG/DL (ref 65–121)
LDL CHOLESTEROL CALCULATED: 150 MG/DL
TRIGL SERPL-MCNC: 120 MG/DL (ref 0–149)
TSH REFLEX FT4: 2.1 UIU/ML (ref 0.35–5.5)
URINE CULTURE, ROUTINE: NORMAL
VITAMIN B-12: 430 PG/ML (ref 211–946)
VITAMIN D 25-HYDROXY: 26.9 NG/ML

## 2020-04-22 PROCEDURE — 84443 ASSAY THYROID STIM HORMONE: CPT

## 2020-04-22 PROCEDURE — 1240000000 HC EMOTIONAL WELLNESS R&B

## 2020-04-22 PROCEDURE — 82607 VITAMIN B-12: CPT

## 2020-04-22 PROCEDURE — 36415 COLL VENOUS BLD VENIPUNCTURE: CPT

## 2020-04-22 PROCEDURE — 99233 SBSQ HOSP IP/OBS HIGH 50: CPT | Performed by: PSYCHIATRY & NEUROLOGY

## 2020-04-22 PROCEDURE — 6370000000 HC RX 637 (ALT 250 FOR IP): Performed by: PSYCHIATRY & NEUROLOGY

## 2020-04-22 PROCEDURE — 6370000000 HC RX 637 (ALT 250 FOR IP): Performed by: FAMILY MEDICINE

## 2020-04-22 PROCEDURE — 82306 VITAMIN D 25 HYDROXY: CPT

## 2020-04-22 PROCEDURE — 83036 HEMOGLOBIN GLYCOSYLATED A1C: CPT

## 2020-04-22 PROCEDURE — 80061 LIPID PANEL: CPT

## 2020-04-22 RX ORDER — ERGOCALCIFEROL 1.25 MG/1
50000 CAPSULE ORAL WEEKLY
Status: DISCONTINUED | OUTPATIENT
Start: 2020-04-22 | End: 2020-04-27 | Stop reason: HOSPADM

## 2020-04-22 RX ORDER — ERGOCALCIFEROL 1.25 MG/1
50000 CAPSULE ORAL WEEKLY
Qty: 11 CAPSULE | Refills: 0 | Status: ON HOLD | OUTPATIENT
Start: 2020-04-22 | End: 2021-02-23

## 2020-04-22 RX ADMIN — PRAVASTATIN SODIUM 40 MG: 20 TABLET ORAL at 21:14

## 2020-04-22 RX ADMIN — MELATONIN 3 MG: at 21:20

## 2020-04-22 RX ADMIN — LISINOPRIL 20 MG: 20 TABLET ORAL at 08:34

## 2020-04-22 RX ADMIN — METOPROLOL TARTRATE 25 MG: 25 TABLET, FILM COATED ORAL at 08:33

## 2020-04-22 RX ADMIN — MIRTAZAPINE 7.5 MG: 7.5 TABLET ORAL at 21:14

## 2020-04-22 RX ADMIN — TRAZODONE HYDROCHLORIDE 50 MG: 50 TABLET ORAL at 21:20

## 2020-04-22 RX ADMIN — ERGOCALCIFEROL 50000 UNITS: 1.25 CAPSULE ORAL at 11:33

## 2020-04-22 RX ADMIN — HYDROCHLOROTHIAZIDE 25 MG: 25 TABLET ORAL at 08:33

## 2020-04-22 ASSESSMENT — PAIN SCALES - GENERAL
PAINLEVEL_OUTOF10: 0
PAINLEVEL_OUTOF10: 0

## 2020-04-22 NOTE — PROGRESS NOTES
[facility] Daily Shift Assessment-Geriatric Unit  Nursing Progress Note          Room: [facility]616-01   Name: Fletcher Flowers   Age: 50 y.o. Gender: female   Dx: <principal problem not specified>  Precautions: suicide risk and fall risk  Inpatient Status: voluntary     SLEEP:    Sleep: Yes,   Sleep Quality Fair   Hours Slept:    Sleep Medications: Yes  PRN Sleep Meds: Yes       MEDICAL:      Other PRN Meds: Yes   Med Compliant: Yes   Accu-Chek: No   Oxygen/CPAP/BiPAP: No  CIWA/CINA: No   PAIN Assessment: headaches  Side Effects from medication: No      PSYCH:     SI denies suicidal ideation    HI Negative for homicidal ideation        AVH:Absent      Depression: 2 Anxiety: 2       GENERAL:      Appetite: good  Social: Yes Speech: normal   Appearance:appropriately dressed  Assistive Devices: walkerLevel of Assist: Minimal Assist      GROUP:    Group Participation: Yes  Participation LevelMinimal    Participation QualityAppropriate    Notes: Pt has been resting in bed most of this shift, only coming out of her room to get VS and participate in group wrap-up. Pt had reports of a headache. Tylenol administered as ordered per MAR. Pt confides tonight that she knows she needs to have a different living situation and that she does not want to \"go back there. \" Pt reports that she was having feelings of hate and that she doesn't like having those feelings. Pt affect is congruent with her mood. Pt reports that her anxiety and depression are both a 2 and that she denies SI, HI, and AVH. Monitoring for safety continues.            Electronically signed by Juanjose Mcdonald RN on 4/21/20 at 10:56 PM CDT

## 2020-04-22 NOTE — PROGRESS NOTES
Demi Barrios MD    lisinopril (PRINIVIL;ZESTRIL) tablet 20 mg, 20 mg, Oral, Daily, 20 mg at 04/22/20 0834 **AND** hydroCHLOROthiazide (HYDRODIURIL) tablet 25 mg, 25 mg, Oral, Daily, Dez Gray MD, 25 mg at 04/22/20 6391    metoprolol tartrate (LOPRESSOR) tablet 25 mg, 25 mg, Oral, BID, Lizzy Kaiser MD, 25 mg at 04/22/20 0066    pravastatin (PRAVACHOL) tablet 40 mg, 40 mg, Oral, Nightly, Lizzy Kaiser MD, 40 mg at 04/21/20 2048    ASSESSMENT AND PLAN  DSM 5 DIAGNOSIS  Impression  Major depressive disorder, recurrent, severe, without psychotic features  Suicidal ideation  Tobacco use disorder     Pertinent medical issues  History of stroke  Hypercholesterolemia  Vitamin D deficiency    Brighter affect, however, still hopeless. Continue to observe. Collateral pending. Plan:   1. Psychiatric Medications:   Continue current psychotropic medications as recommended. Monitor for side effects. The risks, benefits, side effects, indications, contraindications, alternatives and adverse effects of the medications have been discussed with patient. 2. Continue to provide supportive psychotherapy. Encourage socialization and participation in recreational activities. Work on coping skills. 3. Medical Issues:    Continue medical monitoring by Dr. Jude Wills and associates. Low-fat diet. Vitamin supplementation. 4. Disposition:     to provide outpatient resources and facilitate disposition. [address]      Amount of time spent with patient:      35 minutes with greater than 50 % of the time spent in counseling and collaboration of care

## 2020-04-23 PROCEDURE — 6370000000 HC RX 637 (ALT 250 FOR IP): Performed by: PSYCHIATRY & NEUROLOGY

## 2020-04-23 PROCEDURE — 6370000000 HC RX 637 (ALT 250 FOR IP): Performed by: FAMILY MEDICINE

## 2020-04-23 PROCEDURE — 1240000000 HC EMOTIONAL WELLNESS R&B

## 2020-04-23 PROCEDURE — 99233 SBSQ HOSP IP/OBS HIGH 50: CPT | Performed by: PSYCHIATRY & NEUROLOGY

## 2020-04-23 RX ADMIN — ACETAMINOPHEN 650 MG: 325 TABLET, FILM COATED ORAL at 19:16

## 2020-04-23 RX ADMIN — MELATONIN 3 MG: at 20:06

## 2020-04-23 RX ADMIN — LISINOPRIL 20 MG: 20 TABLET ORAL at 08:35

## 2020-04-23 RX ADMIN — HYDROCHLOROTHIAZIDE 25 MG: 25 TABLET ORAL at 08:35

## 2020-04-23 RX ADMIN — HYDROXYZINE HYDROCHLORIDE 25 MG: 25 TABLET, FILM COATED ORAL at 20:06

## 2020-04-23 RX ADMIN — METOPROLOL TARTRATE 25 MG: 25 TABLET, FILM COATED ORAL at 08:35

## 2020-04-23 RX ADMIN — TRAZODONE HYDROCHLORIDE 50 MG: 50 TABLET ORAL at 20:06

## 2020-04-23 RX ADMIN — METOPROLOL TARTRATE 25 MG: 25 TABLET, FILM COATED ORAL at 20:06

## 2020-04-23 RX ADMIN — MIRTAZAPINE 7.5 MG: 7.5 TABLET ORAL at 20:06

## 2020-04-23 RX ADMIN — PRAVASTATIN SODIUM 40 MG: 20 TABLET ORAL at 20:06

## 2020-04-23 ASSESSMENT — PAIN DESCRIPTION - DESCRIPTORS
DESCRIPTORS: HEADACHE

## 2020-04-23 ASSESSMENT — PAIN DESCRIPTION - ONSET
ONSET: GRADUAL

## 2020-04-23 ASSESSMENT — PAIN DESCRIPTION - ORIENTATION
ORIENTATION: OTHER (COMMENT)

## 2020-04-23 ASSESSMENT — PAIN DESCRIPTION - LOCATION
LOCATION: HEAD

## 2020-04-23 ASSESSMENT — PAIN DESCRIPTION - PROGRESSION
CLINICAL_PROGRESSION: GRADUALLY WORSENING
CLINICAL_PROGRESSION: NOT CHANGED
CLINICAL_PROGRESSION: RESOLVED

## 2020-04-23 ASSESSMENT — PAIN SCALES - GENERAL
PAINLEVEL_OUTOF10: 3
PAINLEVEL_OUTOF10: 3
PAINLEVEL_OUTOF10: 0

## 2020-04-23 ASSESSMENT — PAIN DESCRIPTION - FREQUENCY
FREQUENCY: INTERMITTENT

## 2020-04-23 ASSESSMENT — PAIN - FUNCTIONAL ASSESSMENT
PAIN_FUNCTIONAL_ASSESSMENT: ACTIVITIES ARE NOT PREVENTED

## 2020-04-23 ASSESSMENT — PAIN DESCRIPTION - PAIN TYPE
TYPE: ACUTE PAIN

## 2020-04-23 NOTE — PROGRESS NOTES
I Daily Shift Assessment-Geriatric Unit  Nursing Progress Note          Room: [facility]616-01   Name: Tarun Costa   Age: 50 y.o. Gender: female   Dx: <principal problem not specified>  Precautions: suicide risk and fall risk  Inpatient Status: voluntary     SLEEP:    Sleep: Yes,   Sleep Quality Good   Hours Slept: 8   Sleep Medications: Yes  PRN Sleep Meds: Yes       MEDICAL:      Other PRN Meds: No   Med Compliant: Yes   Accu-Chek: No   Oxygen/CPAP/BiPAP: No  CIWA/CINA: No   PAIN Assessment: none  Side Effects from medication: No      PSYCH:     SI denies suicidal ideation    HI Negative for homicidal ideation        AVH:Absent      Depression: 1-2 Anxiety: 2-3        GENERAL:      Appetite: no change from normal  Social: No Speech: normal   Appearance:appropriately dressed  Assistive Devices: walkerLevel of Assist: Independent      GROUP:    Group Participation: Yes  Participation LevelActive Listener    Participation QualityAppropriate    Notes:     Patient has been in bed this shift. She has been pleasant and cooperative with staff.        Electronically signed by Dirk Aguirre RN on 4/23/20 at 1:51 AM SONYAT

## 2020-04-23 NOTE — PROGRESS NOTES
SW met with treatment team to discuss pt's progress and setbacks. SW 2 was present. Pt reportedly slept well last night, with medications, appetite is good, attends scheduled group activities, social with peers/staff, performs ADL's, compliant with medications, behavior has been pleasant/cooperative, reports mild depression/anxiety, denies SI/HI/AVH,referral has been sent to [facility], continue current treatment plan.

## 2020-04-23 NOTE — PROGRESS NOTES
Department of Psychiatry  Attending Progress Note     Chief complaint: \"I'm better\"    SUBJECTIVE:   Chart reviewed, discussed with the team.  Patient is compliant with treatment. Isolates to her room. No issues with sleep and appetite. Patient is calm and cooperative when seen in the dining area. States she slept well. Reports good appetite. Mood is somewhat better. Appears more future-oriented. Voicing anxiety related to her social stressors. We processed her feelings. Supportive psychotherapy provided. Patient got a hold of the [address] yesterday. Documentation sent out. Awaiting response. OBJECTIVE    Physical  Wt Readings from Last 3 Encounters:   04/21/20 145 lb 6.4 oz (66 kg)   06/28/19 155 lb (70.3 kg)   05/09/19 162 lb 2 oz (73.5 kg)     Temp Readings from Last 3 Encounters:   04/23/20 97.6 °F (36.4 °C) (Temporal)   07/05/19 98.9 °F (37.2 °C) (Temporal)   05/13/19 98.2 °F (36.8 °C) (Temporal)     BP Readings from Last 3 Encounters:   04/23/20 119/80   07/05/19 121/69   05/13/19 101/68     Pulse Readings from Last 3 Encounters:   04/23/20 86   07/05/19 119   05/13/19 87        Review of Systems: 14-point review of systems negative except as described above    Mental Status Examination:   Appearance: Stated age, thin. Behavior: Calm, cooperative. Speech: Normal in tone, volume, and quality. No slurring, dysarthria or pressured speech noted. Mood: \"Better\"   Affect: Mood congruent. Thought Process: Appears linear. Thought Content: Denies suicidal and homicidal ideation. No overt delusions or paranoia appreciated. Perceptions: Denies auditory or visual hallucinations at present time. Not responding to internal stimuli. Concentration: Intact. Orientation: to person, place, date, and situation. Language: Intact. Fund of information: Intact. Memory: Recent and remote appear intact. Neurovegitative: Fair appetite and sleep. Insight: Improving.    Judgment:

## 2020-04-23 NOTE — PLAN OF CARE
Problem: Falls - Risk of:  Goal: Will remain free from falls  Description: Will remain free from falls  4/23/2020 0907 by Stephanie Aguirre RN  Outcome: Ongoing  4/22/2020 2347 by Chantal Velasquez RN  Outcome: Ongoing  Goal: Absence of physical injury  Description: Absence of physical injury  4/23/2020 0907 by Stephanie Aguirre RN  Outcome: Ongoing  4/22/2020 2347 by Chantal Velasquez RN  Outcome: Ongoing     Problem: Depressive Behavior With or Without Suicide Precautions:  Goal: Able to verbalize acceptance of life and situations over which he or she has no control  Description: Able to verbalize acceptance of life and situations over which he or she has no control  4/23/2020 0907 by Stephanie Aguirre RN  Outcome: Ongoing  4/22/2020 2347 by Chantal Velasquez RN  Outcome: Ongoing  Goal: Able to verbalize and/or display a decrease in depressive symptoms  Description: Able to verbalize and/or display a decrease in depressive symptoms  4/23/2020 0907 by Stephanie Aguirre RN  Outcome: Ongoing  4/22/2020 2347 by Chantal Velasquez RN  Outcome: Ongoing  Goal: Ability to disclose and discuss suicidal ideas will improve  Description: Ability to disclose and discuss suicidal ideas will improve  4/23/2020 0907 by Stephanie Aguirre RN  Outcome: Ongoing  4/22/2020 2347 by Chantal Velasquez RN  Outcome: Ongoing  Goal: Able to verbalize support systems  Description: Able to verbalize support systems  4/23/2020 0907 by Stephanie Aguirre RN  Outcome: Ongoing  4/22/2020 2347 by Chantal Velasquez RN  Outcome: Ongoing  Goal: Absence of self-harm  Description: Absence of self-harm  4/23/2020 0907 by Stephanie Aguirre RN  Outcome: Ongoing  4/22/2020 2347 by Chantal Velasquez RN  Outcome: Ongoing  Goal: Patient specific goal  Description: Patient specific goal  4/23/2020 0907 by Stephanie Aguirre RN  Outcome: Ongoing  4/22/2020 2347 by Chantal Velasquez RN  Outcome: Ongoing  Goal: Participates in care planning  Description: Participates in care planning  4/23/2020 0907 by [facility]

## 2020-04-23 NOTE — PROGRESS NOTES
Group Therapy Note    Date: 4/23/2020  Start Time: 1600  End Time:  3937  Number of Participants: 4    Type of Group: Healthy Living/Wellness    Status After Intervention:  Unchanged    Participation Level:  Active Listener    Participation Quality: Appropriate and Attentive      Speech:  normal      Thought Process/Content: Logical      Affective Functioning: Congruent      Mood: depressed      Level of consciousness:  Oriented x4      Response to Learning: Able to verbalize current knowledge/experience      Endings: None Reported    Modes of Intervention: Support      Discipline Responsible: Registered Nurse      Signature:  Sayra Mcclure RN

## 2020-04-23 NOTE — PROGRESS NOTES
Pt completed assessment with [facility] over phone. Pt reported they needed this writer to call and speak with them. This writer spoke with Leonard Jara (verbal consent given by pt and PHI signed) with [facility] and she reported their fax machine is not working so she would need this writer to e-mail a referral. E-mail was sent as requested. All identifying information was marked out on e-mail and secure was written on the cover.      Electronically signed by Buzz Chen LPC on 4/23/2020 at 11:59 AM

## 2020-04-24 LAB
GLUCOSE BLD-MCNC: 137 MG/DL (ref 70–99)
PERFORMED ON: ABNORMAL

## 2020-04-24 PROCEDURE — 6370000000 HC RX 637 (ALT 250 FOR IP): Performed by: FAMILY MEDICINE

## 2020-04-24 PROCEDURE — 99233 SBSQ HOSP IP/OBS HIGH 50: CPT | Performed by: PSYCHIATRY & NEUROLOGY

## 2020-04-24 PROCEDURE — 1240000000 HC EMOTIONAL WELLNESS R&B

## 2020-04-24 PROCEDURE — 82947 ASSAY GLUCOSE BLOOD QUANT: CPT

## 2020-04-24 RX ORDER — LISINOPRIL 10 MG/1
10 TABLET ORAL DAILY
Status: DISCONTINUED | OUTPATIENT
Start: 2020-04-24 | End: 2020-04-24

## 2020-04-24 RX ORDER — HYDROCHLOROTHIAZIDE 25 MG/1
25 TABLET ORAL DAILY
Status: DISCONTINUED | OUTPATIENT
Start: 2020-04-24 | End: 2020-04-24

## 2020-04-24 RX ADMIN — LISINOPRIL 10 MG: 10 TABLET ORAL at 08:20

## 2020-04-24 RX ADMIN — HYDROCHLOROTHIAZIDE 25 MG: 25 TABLET ORAL at 08:20

## 2020-04-24 ASSESSMENT — PAIN SCALES - GENERAL
PAINLEVEL_OUTOF10: 0

## 2020-04-24 NOTE — PROGRESS NOTES
This writer called [facility] and spoke with [place] about pt referral and possible acceptance. Per [place] bar[place], Dr. Rodrigo Joyner has yet to respond to information forwarded to her but [place] bruce reported to this writer that she would send a prompt e-mail to the above physician to encourage a response. [place] [facility] will call back to the unit and let staff know if the patient is to be accepted there. Direct number to floor given.

## 2020-04-24 NOTE — PROGRESS NOTES
Group Therapy Note    Date: 4/24/2020  Start Time: 1600  End Time:  4110  Number of Participants: 3    Type of Group: Healthy Living/Wellness    Status After Intervention:  Unchanged    Participation Level:  Active Listener    Participation Quality: Appropriate      Speech:  normal      Thought Process/Content: Logical      Affective Functioning: Congruent      Mood: depressed      Level of consciousness:  Alert      Response to Learning: Able to retain information      Endings: None Reported    Modes of Intervention: Support      Discipline Responsible: Registered Nurse      Signature:  Pool Hoffman RN

## 2020-04-24 NOTE — PLAN OF CARE
Problem: Falls - Risk of:  Goal: Will remain free from falls  Description: Will remain free from falls  4/23/2020 2225 by Lucia Camp RN  Outcome: Ongoing  4/23/2020 0907 by Jill Reyes RN  Outcome: Ongoing  Goal: Absence of physical injury  Description: Absence of physical injury  4/23/2020 2225 by Lucia Camp RN  Outcome: Ongoing  4/23/2020 0907 by Jill Reyes RN  Outcome: Ongoing     Problem: Depressive Behavior With or Without Suicide Precautions:  Goal: Able to verbalize acceptance of life and situations over which he or she has no control  Description: Able to verbalize acceptance of life and situations over which he or she has no control  4/23/2020 2225 by Lucia Camp RN  Outcome: Ongoing  4/23/2020 1200 by Karo Greenwood  Outcome: Ongoing  Note:                                                                     Group Therapy Note    Date: 4/23/2020  Start Time: 1100  End Time:  1200  Number of Participants: 4    Type of Group: Cognitive Skills    Wellness Binder Information  Module Name:  [address] [place] [address]  Session Number:  1    Group Goal for Pt: To improve knowledge of practical facts about depression    Notes:  Pt demonstrated improved knowledge of practical facts about depression by actively participating in group activity.     Status After Intervention:  Unchanged    Participation Level: Interactive    Participation Quality: Attentive      Speech:  normal      Thought Process/Content: Logical      Affective Functioning: Congruent      Mood: anxious and depressed      Level of consciousness:  Alert and Oriented x4      Response to Learning: Able to verbalize current knowledge/experience, Able to verbalize/acknowledge new learning, and Progressing to goal      Endings: None Reported    Modes of Intervention: Education      Discipline Responsible: Psychoeducational Specialist      Signature:  Karo Greenwood    4/23/2020 9915 by Jill Reyes RN  Outcome: Ongoing  Goal: Able to verbalize and/or display a decrease in depressive symptoms  Description: Able to verbalize and/or display a decrease in depressive symptoms  4/23/2020 2225 by Nestora Simmonds, RN  Outcome: Ongoing  4/23/2020 0907 by Alonso Bonilla RN  Outcome: Ongoing  Goal: Ability to disclose and discuss suicidal ideas will improve  Description: Ability to disclose and discuss suicidal ideas will improve  4/23/2020 2225 by Nestora Simmonds, RN  Outcome: Ongoing  4/23/2020 0907 by Alonso Bonilla RN  Outcome: Ongoing  Goal: Able to verbalize support systems  Description: Able to verbalize support systems  4/23/2020 2225 by Nestora Simmonds, RN  Outcome: Ongoing  4/23/2020 0907 by Alonso Bonilla RN  Outcome: Ongoing  Goal: Absence of self-harm  Description: Absence of self-harm  4/23/2020 2225 by Nestora Simmonds, RN  Outcome: Ongoing  4/23/2020 0907 by Alonso Bonilla RN  Outcome: Ongoing  Goal: Patient specific goal  Description: Patient specific goal  4/23/2020 2225 by Nestora Simmonds, RN  Outcome: Ongoing  4/23/2020 0907 by Alonso Bonilla RN  Outcome: Ongoing  Goal: Participates in care planning  Description: Participates in care planning  4/23/2020 2225 by Nestora Simmonds, RN  Outcome: Ongoing  4/23/2020 0907 by Alonso Bonilla RN  Outcome: Ongoing     Problem: Substance Abuse:  Goal: Absence of drug withdrawal signs and symptoms  Description: Absence of drug withdrawal signs and symptoms  4/23/2020 2225 by Nestora Simmonds, RN  Outcome: Ongoing  4/23/2020 0907 by Alonso Bonilla RN  Outcome: Ongoing  Goal: Participates in care planning  Description: Participates in care planning  4/23/2020 2225 by Nestora Simmonds, RN  Outcome: Ongoing  4/23/2020 0907 by Alonso Bonilla RN  Outcome: Ongoing  Goal: Patient specific goal  Description: Patient specific goal  4/23/2020 2225 by Nestora Simmonds, RN  Outcome: Ongoing  4/23/2020 0907 by Alonso Bonilla RN  Outcome: Ongoing     Problem: Cardiac:  Goal: Ability to maintain vital signs within normal range will

## 2020-04-24 NOTE — PLAN OF CARE
Complications related to the disease process, condition or treatment will be avoided or minimized  4/24/2020 0908 by Rupal Rinalid RN  Outcome: Ongoing  4/23/2020 2225 by Wong Al RN  Outcome: Ongoing     Problem: Pain:  Goal: Pain level will decrease  Description: Pain level will decrease  4/24/2020 0908 by Rupal Rinaldi RN  Outcome: Ongoing  4/23/2020 2225 by Wong Al RN  Outcome: Ongoing  Goal: Control of acute pain  Description: Control of acute pain  4/24/2020 0908 by Rupal Rinaldi RN  Outcome: Ongoing  4/23/2020 2225 by Wong Al RN  Outcome: Ongoing  Goal: Control of chronic pain  Description: Control of chronic pain  4/24/2020 0908 by Rupal Rinaldi RN  Outcome: Ongoing  4/23/2020 2225 by Wong Al RN  Outcome: Ongoing

## 2020-04-24 NOTE — PROGRESS NOTES
Neurovegitative: Fair appetite and sleep. Insight: Improved. Judgment: Improved.     Data  Lab Results   Component Value Date    WBC 8.5 04/20/2020    HGB 13.3 04/20/2020    HCT 39.3 04/20/2020    MCV 95.2 04/20/2020     04/20/2020      Lab Results   Component Value Date     04/20/2020    K 3.6 04/20/2020     04/20/2020    CO2 23 04/20/2020    BUN 9 04/20/2020    CREATININE 0.7 04/20/2020    GLUCOSE 104 04/20/2020    CALCIUM 9.5 04/20/2020    PROT 7.1 04/20/2020    LABALBU 4.4 04/20/2020    BILITOT <0.2 04/20/2020    ALKPHOS 82 04/20/2020    AST 12 04/20/2020    ALT 13 04/20/2020    LABGLOM >60 04/20/2020    GLOB 3.0 11/12/2016       Medications    Current Facility-Administered Medications:     lisinopril (PRINIVIL;ZESTRIL) tablet 10 mg, 10 mg, Oral, Daily, 10 mg at 04/24/20 0820 **AND** hydroCHLOROthiazide (HYDRODIURIL) tablet 25 mg, 25 mg, Oral, Daily, Palomo Yi MD, 25 mg at 04/24/20 0820    vitamin D (ERGOCALCIFEROL) capsule 50,000 Units, 50,000 Units, Oral, Weekly, Palomo Yi MD, 50,000 Units at 04/22/20 1133    polyethylene glycol (GLYCOLAX) packet 17 g, 17 g, Oral, Daily PRN, Prabha Hobbs MD    melatonin tablet 3 mg, 3 mg, Oral, Nightly PRN, Prabha Hobbs MD, 3 mg at 04/23/20 2006    cloNIDine (CATAPRES) tablet 0.1 mg, 0.1 mg, Oral, Q6H PRN, Prabha Hobbs MD, 0.1 mg at 04/21/20 0125    acetaminophen (TYLENOL) tablet 650 mg, 650 mg, Oral, Q4H PRN, Prabha Hobbs MD, 650 mg at 04/23/20 1916    mirtazapine (REMERON) tablet 7.5 mg, 7.5 mg, Oral, Nightly, Michael Miles MD, 7.5 mg at 04/23/20 2006    traZODone (DESYREL) tablet 50 mg, 50 mg, Oral, Nightly PRN, Michael Miles MD, 50 mg at 04/23/20 2006    hydrOXYzine (ATARAX) tablet 25 mg, 25 mg, Oral, TID PRN, Michael Miles MD, 25 mg at 04/23/20 2006    metoprolol tartrate (LOPRESSOR) tablet 25 mg, 25 mg, Oral, BID, Palomo Yi MD, Stopped at 04/24/20 0923    pravastatin (PRAVACHOL) tablet 40 mg, 40 mg, Oral, Nightly, Huy Alejandro MD, 40 mg at 04/23/20 2006    ASSESSMENT AND PLAN  DSM 5 DIAGNOSIS  Impression  Major depressive disorder, recurrent, severe, without psychotic features  Tobacco use disorder     Pertinent medical issues  History of stroke  Hypercholesterolemia  Vitamin D deficiency    No significant changes in presentation. Disposition pending. Plan:   1. Psychiatric Medications:   Continue current psychotropic medications as recommended. Monitor for side effects. The risks, benefits, side effects, indications, contraindications, alternatives and adverse effects of the medications have been discussed with patient. 2. Continue to provide supportive psychotherapy. Encourage socialization and participation in recreational activities. Work on coping skills. 3. Medical Issues:    Continue medical monitoring by Dr. Susie Alvarado and associates. Low-fat diet. Vitamin supplementation. 4. Disposition:     to provide outpatient resources and facilitate disposition.        Amount of time spent with patient:      35 minutes with greater than 50 % of the time spent in counseling and collaboration of care

## 2020-04-25 PROCEDURE — 6370000000 HC RX 637 (ALT 250 FOR IP): Performed by: PSYCHIATRY & NEUROLOGY

## 2020-04-25 PROCEDURE — 99233 SBSQ HOSP IP/OBS HIGH 50: CPT | Performed by: PSYCHIATRY & NEUROLOGY

## 2020-04-25 PROCEDURE — 1240000000 HC EMOTIONAL WELLNESS R&B

## 2020-04-25 PROCEDURE — 6370000000 HC RX 637 (ALT 250 FOR IP): Performed by: FAMILY MEDICINE

## 2020-04-25 RX ADMIN — MELATONIN 3 MG: at 20:31

## 2020-04-25 RX ADMIN — PRAVASTATIN SODIUM 40 MG: 20 TABLET ORAL at 20:31

## 2020-04-25 RX ADMIN — MIRTAZAPINE 7.5 MG: 7.5 TABLET ORAL at 20:31

## 2020-04-25 ASSESSMENT — PAIN SCALES - GENERAL
PAINLEVEL_OUTOF10: 0

## 2020-04-25 NOTE — PROGRESS NOTES
for any changes. Patient stated that that she has passed out in the past and that she was feeling better at this time. 2135 - Rapid response ended    Orthostatic BP taken, Larissa Li monitoring patient closely with VS being taken every hours or soon, monitoring patient with door open, bed alarm on and at eye view at this time. Will continue to monitor patient for safety, walker at bedside, door is open to nurse view. Dr BUCKLEY notified of results of Rapid response and orthostatic BP at 2215, and current patient condition.

## 2020-04-25 NOTE — FLOWSHEET NOTE
04/24/20 2122   Provider Notification   Reason for Communication New orders; Review case;Evaluate   Provider Name Dr Yvan Avery   Provider Notification Physician   Method of Communication Call  (Dr Yvan Avery called back to unit and stated that she stop meds)   Response See orders   Notification Time   (2122)   Dr Sandie Oneil called back the unit and she said that she reviewed VS and she stopped all hypertension medications and to monitor patient, she was given update on low BP and 02 sats (80-82%)and that oxygen was administered. Dr Yvan Avery instructed to call a Rapid Response so attending physician can examine patient and contact her with results, orders administered and physician made aware. No new orders from Rapid Response. Continue to monitor VS every hour.

## 2020-04-25 NOTE — PROGRESS NOTES
Department of Psychiatry  Attending Progress Note - Geriatric      SUBJECTIVE:    50years old -American female with previous psychiatric history of depression who has been admitted to the psychiatric unit secondary to suicidal ideations. Patient has been seen in treatment team room with the presence of the patient's nurse. Patient was wearing hospital attire and was using walker for ambulation. Yesterday evening patient has decreased blood pressure 85/56. Rapid response team was called and patient has been evaluated by Dr. Ravi Alvarez. Patient was provided with juice, crackers, and her blood pressure medications have been put on hold. Patient's condition improved over the night, blood pressure on the morning is 103/75. Today, she reported that she slept \"on and off\" during the last night, because her blood pressure has been checked every hour and it woke her up. She reported having a good appetite, stated that she ate all her breakfast.  Patient is compliant with psychotropic medications and denies any side effects. She reported mild feeling of anxiety, secondary to worry about discharge from the hospital.  It was planned to transfer patient to [facility] yesterday, however, there is no available beds at this time there and patient's transfer was postponed. Patient denies any significant affective symptomatology today. She denies current active suicidal homicidal ideations. She denies any auditory and visual hallucinations. OBJECTIVE    Physical  VITALS:  /75   Pulse 92   Temp 97.5 °F (36.4 °C) (Temporal)   Resp 18   Ht 5' 4\" (1.626 m)   Wt 145 lb 6.4 oz (66 kg)   LMP  (LMP Unknown)   SpO2 96%   BMI 24.96 kg/m²   TEMPERATURE:  Current - Temp: 97.5 °F (36.4 °C);  Max - Temp  Av.2 °F (36.2 °C)  Min: 96.6 °F (35.9 °C)  Max: 97.5 °F (36.4 °C)  RESPIRATIONS RANGE: Resp  Av.7  Min: 14  Max: 22  PULSE RANGE: Pulse  Av.1  Min: 74  Max: 92  BLOOD PRESSURE RANGE:  Systolic

## 2020-04-25 NOTE — FLOWSHEET NOTE
04/24/20 2108   Provider Notification   Reason for Communication Evaluate;New orders; Review case   Provider Name Dr Antonio Juares   Provider Notification Physician   Method of Communication Call   Response See orders   Notification Time 2108   called Dr Antonio Juares, stop all meds, increase fluids, monitor BP every hours manual BP, call back in an hour with results of patient condition

## 2020-04-25 NOTE — PLAN OF CARE
affecting health status  Description: Will modify at least one risk factor affecting health status  Outcome: Ongoing  Goal: Identification of resources available to assist in meeting health care needs will improve  Description: Identification of resources available to assist in meeting health care needs will improve  Outcome: Ongoing     Problem: Physical Regulation:  Goal: Complications related to the disease process, condition or treatment will be avoided or minimized  Description: Complications related to the disease process, condition or treatment will be avoided or minimized  Outcome: Ongoing     Problem: Pain:  Goal: Pain level will decrease  Description: Pain level will decrease  Outcome: Ongoing  Goal: Control of acute pain  Description: Control of acute pain  Outcome: Ongoing  Goal: Control of chronic pain  Description: Control of chronic pain  Outcome: Ongoing     Problem: Pain:  Goal: Pain level will decrease  Description: Pain level will decrease  Outcome: Ongoing  Goal: Control of acute pain  Description: Control of acute pain  Outcome: Ongoing  Goal: Control of chronic pain  Description: Control of chronic pain  Outcome: Ongoing

## 2020-04-26 PROCEDURE — 6370000000 HC RX 637 (ALT 250 FOR IP): Performed by: PSYCHIATRY & NEUROLOGY

## 2020-04-26 PROCEDURE — 1240000000 HC EMOTIONAL WELLNESS R&B

## 2020-04-26 PROCEDURE — 6370000000 HC RX 637 (ALT 250 FOR IP): Performed by: FAMILY MEDICINE

## 2020-04-26 RX ADMIN — MIRTAZAPINE 7.5 MG: 7.5 TABLET ORAL at 20:39

## 2020-04-26 RX ADMIN — MELATONIN 3 MG: at 20:39

## 2020-04-26 RX ADMIN — PRAVASTATIN SODIUM 40 MG: 20 TABLET ORAL at 20:39

## 2020-04-26 ASSESSMENT — PAIN SCALES - GENERAL
PAINLEVEL_OUTOF10: 0
PAINLEVEL_OUTOF10: 0

## 2020-04-26 NOTE — PLAN OF CARE
planning  Description: Participates in care planning  4/25/2020 2316 by Chepe Meade RN  Outcome: Ongoing  4/25/2020 1844 by Omar Donald RN  Outcome: Ongoing     Problem: Substance Abuse:  Goal: Absence of drug withdrawal signs and symptoms  Description: Absence of drug withdrawal signs and symptoms  4/25/2020 2316 by Chepe Meade RN  Outcome: Ongoing  4/25/2020 1844 by Omar Donald RN  Outcome: Met This Shift  Goal: Participates in care planning  Description: Participates in care planning  4/25/2020 2316 by Chepe Meade RN  Outcome: Ongoing  4/25/2020 1844 by Omar Donald RN  Outcome: Met This Shift  Goal: Patient specific goal  Description: Patient specific goal  4/25/2020 2316 by Chepe Meade RN  Outcome: Ongoing  4/25/2020 1844 by Omar Donald RN  Outcome: Met This Shift     Problem: Cardiac:  Goal: Ability to maintain vital signs within normal range will improve  Description: Ability to maintain vital signs within normal range will improve  4/25/2020 2316 by Chepe Meade RN  Outcome: Ongoing  4/25/2020 1844 by Omar Donald RN  Outcome: Met This Shift  Goal: Cardiovascular alteration will improve  Description: Cardiovascular alteration will improve  4/25/2020 2316 by Chepe Meade RN  Outcome: Ongoing  4/25/2020 1844 by Omar Donald RN  Outcome: Met This Shift     Problem: Cardiac:  Goal: Ability to maintain vital signs within normal range will improve  Description: Ability to maintain vital signs within normal range will improve  4/25/2020 2316 by Chepe Meade RN  Outcome: Ongoing  4/25/2020 1844 by Omar Donald RN  Outcome: Met This Shift  Goal: Cardiovascular alteration will improve  Description: Cardiovascular alteration will improve  4/25/2020 2316 by Chepe Meade RN  Outcome: Ongoing  4/25/2020 1844 by Omar Donald RN  Outcome: Met This Shift     Problem: Health Behavior:  Goal: Will modify at least one risk factor affecting health status  Description: Will modify at

## 2020-04-26 NOTE — PROGRESS NOTES
[facility] Daily Shift Assessment  Nursing Progress Note    Room: 0618/618-01 Name: Usman Arce Age: 50 y.o. Gender: female   Dx: <principal problem not specified>  Precautions: suicide risk, fall risk and seizure precautions  Target Symptoms:   Accu-Chek: NoSleep: Yes,Sleep Quality Good SI denies AVH denies [facility]  ADLs: Yes Speech: normal Depression: \"improving\" Anxiety: \"improving\"   Participation LevelActive Listener  Appetite: Good  Visitation: No   Participation QualityAppropriate and Attentive    Notes: pt calm and cooperative sitting in day area during assessment. Pt states she slept well last night and feels \"good\" today. Pt states she is anxious about being able to get into [address] upon discharge. Pt states \"Im ok as long as I dont have to go back home with him\". Pt denies SI,HI and AVH and states her depression and anxiety are improving. BP is WNL. Will continue to monitor.      Signature: Tigist Jones

## 2020-04-26 NOTE — PLAN OF CARE
planning  4/26/2020 0908 by Tigre Evangelista RN  Outcome: Ongoing  4/25/2020 2316 by Valdemar Banegas RN  Outcome: Ongoing     Problem: Substance Abuse:  Goal: Absence of drug withdrawal signs and symptoms  Description: Absence of drug withdrawal signs and symptoms  4/26/2020 0908 by Tigre Evangelista RN  Outcome: Ongoing  4/25/2020 2316 by Valdemar Banegas RN  Outcome: Ongoing  Goal: Participates in care planning  Description: Participates in care planning  4/26/2020 0908 by Tigre Evangelista RN  Outcome: Ongoing  4/25/2020 2316 by Valdemar Banegas RN  Outcome: Ongoing  Goal: Patient specific goal  Description: Patient specific goal  4/26/2020 0908 by Tigre Evangelista RN  Outcome: Ongoing  4/25/2020 2316 by Valdemar Banegas RN  Outcome: Ongoing     Problem: Cardiac:  Goal: Ability to maintain vital signs within normal range will improve  Description: Ability to maintain vital signs within normal range will improve  4/26/2020 0908 by Tigre Evangelista RN  Outcome: Ongoing  4/25/2020 2316 by Valdemar Banegas RN  Outcome: Ongoing  Goal: Cardiovascular alteration will improve  Description: Cardiovascular alteration will improve  4/26/2020 0908 by Tigre Evangelista RN  Outcome: Ongoing  4/25/2020 2316 by Valdemar Banegas RN  Outcome: Ongoing     Problem: Health Behavior:  Goal: Will modify at least one risk factor affecting health status  Description: Will modify at least one risk factor affecting health status  4/26/2020 0908 by Tigre Evangelista RN  Outcome: Ongoing  4/25/2020 2316 by Valdemar Banegas RN  Outcome: Ongoing  Goal: Identification of resources available to assist in meeting health care needs will improve  Description: Identification of resources available to assist in meeting health care needs will improve  4/26/2020 0908 by Tigre Evangelista RN  Outcome: Ongoing  4/25/2020 2316 by Valdemar Banegas RN  Outcome: Ongoing     Problem: Physical Regulation:  Goal: Complications related to the disease process, condition or treatment

## 2020-04-27 VITALS
OXYGEN SATURATION: 97 % | HEART RATE: 101 BPM | RESPIRATION RATE: 16 BRPM | BODY MASS INDEX: 24.82 KG/M2 | HEIGHT: 64 IN | SYSTOLIC BLOOD PRESSURE: 106 MMHG | WEIGHT: 145.4 LBS | TEMPERATURE: 97.1 F | DIASTOLIC BLOOD PRESSURE: 76 MMHG

## 2020-04-27 PROBLEM — R90.89 ABNORMAL FINDING ON MRI OF BRAIN: Status: RESOLVED | Noted: 2018-02-28 | Resolved: 2020-04-27

## 2020-04-27 PROBLEM — F33.2 MAJOR DEPRESSIVE DISORDER, RECURRENT SEVERE WITHOUT PSYCHOTIC FEATURES (HCC): Status: RESOLVED | Noted: 2019-05-10 | Resolved: 2020-04-27

## 2020-04-27 PROBLEM — F32.A DEPRESSION, ACUTE: Status: RESOLVED | Noted: 2020-04-21 | Resolved: 2020-04-27

## 2020-04-27 PROBLEM — R27.0 ATAXIA: Status: RESOLVED | Noted: 2018-01-02 | Resolved: 2020-04-27

## 2020-04-27 PROBLEM — R45.851 SUICIDAL IDEATIONS: Status: RESOLVED | Noted: 2019-06-29 | Resolved: 2020-04-27

## 2020-04-27 PROBLEM — I69.391 DYSPHAGIA DUE TO RECENT CEREBRAL INFARCTION: Status: RESOLVED | Noted: 2018-02-28 | Resolved: 2020-04-27

## 2020-04-27 PROBLEM — R00.1 BRADYCARDIA: Status: RESOLVED | Noted: 2019-01-08 | Resolved: 2020-04-27

## 2020-04-27 PROCEDURE — 99239 HOSP IP/OBS DSCHRG MGMT >30: CPT | Performed by: PSYCHIATRY & NEUROLOGY

## 2020-04-27 PROCEDURE — 5130000000 HC BRIDGE APPOINTMENT

## 2020-04-27 RX ORDER — MIRTAZAPINE 7.5 MG/1
7.5 TABLET, FILM COATED ORAL NIGHTLY
Qty: 30 TABLET | Refills: 1 | Status: ON HOLD | OUTPATIENT
Start: 2020-04-27 | End: 2021-02-23

## 2020-04-27 NOTE — PLAN OF CARE
will be avoided or minimized  Description: Complications related to the disease process, condition or treatment will be avoided or minimized  4/26/2020 2257 by Ryann Ortega RN  Outcome: Ongoing  4/26/2020 0908 by Javier Matson RN  Outcome: Ongoing     Problem: Pain:  Goal: Pain level will decrease  Description: Pain level will decrease  4/26/2020 2257 by Ryann Ortega RN  Outcome: Met This Shift  4/26/2020 0908 by Javier Matson RN  Outcome: Ongoing  Goal: Control of acute pain  Description: Control of acute pain  4/26/2020 2257 by Ryann Ortega RN  Outcome: Met This Shift  4/26/2020 0908 by Javier Matson RN  Outcome: Ongoing  Goal: Control of chronic pain  Description: Control of chronic pain  4/26/2020 2257 by Ryann Ortega RN  Outcome: Met This Shift  4/26/2020 0908 by Javier Matson RN  Outcome: Ongoing

## 2020-04-27 NOTE — PLAN OF CARE
by Alonso Bonilla RN  Outcome: Ongoing  4/26/2020 2257 by Preet Moreno RN  Outcome: Ongoing     Problem: Substance Abuse:  Goal: Absence of drug withdrawal signs and symptoms  Description: Absence of drug withdrawal signs and symptoms  4/27/2020 0909 by Alonso Bonilla RN  Outcome: Ongoing  4/26/2020 2257 by Preet Moreno RN  Outcome: Ongoing  Goal: Participates in care planning  Description: Participates in care planning  4/27/2020 0909 by Alonso Bonilla RN  Outcome: Ongoing  4/26/2020 2257 by Preet Moreno RN  Outcome: Ongoing  Goal: Patient specific goal  Description: Patient specific goal  4/27/2020 0909 by Alonso Bonilla RN  Outcome: Ongoing  4/26/2020 2257 by Preet Moreno RN  Outcome: Ongoing     Problem: Cardiac:  Goal: Ability to maintain vital signs within normal range will improve  Description: Ability to maintain vital signs within normal range will improve  4/27/2020 0909 by Alonso Bonilla RN  Outcome: Ongoing  4/26/2020 2257 by Preet Moreno RN  Outcome: Ongoing  Goal: Cardiovascular alteration will improve  Description: Cardiovascular alteration will improve  4/27/2020 0909 by Alonso Bonilla RN  Outcome: Ongoing  4/26/2020 2257 by Preet Moreno RN  Outcome: Ongoing     Problem: Health Behavior:  Goal: Will modify at least one risk factor affecting health status  Description: Will modify at least one risk factor affecting health status  4/27/2020 0909 by Alonso Bonilla RN  Outcome: Ongoing  4/26/2020 2257 by Preet Moreno RN  Outcome: Ongoing  Goal: Identification of resources available to assist in meeting health care needs will improve  Description: Identification of resources available to assist in meeting health care needs will improve  4/27/2020 0909 by Alonso Bonilla RN  Outcome: Ongoing  4/26/2020 2257 by Preet Moreno RN  Outcome: Ongoing     Problem: Physical Regulation:  Goal: Complications related to the disease process, condition or treatment will be avoided or minimized  Description:

## 2020-04-27 NOTE — PROGRESS NOTES
NEVA attempted to call CARLOS.  at  transferred this writer to  Tashia Machado. Received Angelica's voicemail and left contact information requesting a call back.      Electronically signed by Sofi Yi, [id number] on 4/27/2020 at 9:22 AM

## 2020-04-27 NOTE — DISCHARGE SUMMARY
(L) 01/02/2018     Lab Results   Component Value Date    LDLCALC 150 04/22/2020    LDLCALC 137 05/10/2019    LDLCALC 129 01/02/2018     No results found for: LABVLDL, VLDL  No results found for: CHOLHDLRATIO  Lab Results   Component Value Date    LABA1C 5.5 04/22/2020     No results found for: EAG  Lab Results   Component Value Date    TSHFT4 2.10 04/22/2020    TSH 2.160 07/30/2019       Treatments: RN and SW    Mental status examination at the time of discharge:  Alert, Oriented X 4  Appearance:  Improved Hygiene  Speech with Regular Rate and Rhythm  Eye Contact:  Good  No Psychomotor Agitation/Retardation Noted  Attitude:  Cooperative  Mood:  \"Better\"  Affective: Congruent, appropriate to the situation, with a normal range and intensity  Thought Processes:  Coherently communicated, logical and goal oriented  Thought Content:  No Suicidal Ideation, No Homicidal Ideation, No Auditory or Visual Hallucinations, NO Overt Delusions  Insight: Improved  Judgement: Improved  Memory is intact for both remote and recent  Intellectual Functioning:  Within the [facility] [address] [facility] Knowledge:  Adequate  Attention and Concentration:  Adequate    Discharge Exam:  Please, see medical note    Disposition: shelter    Patient Instructions:   Current Discharge Medication List      START taking these medications    Details   mirtazapine (REMERON) 7.5 MG tablet Take 1 tablet by mouth nightly  Qty: 30 tablet, Refills: 1      vitamin D (ERGOCALCIFEROL) 1.25 MG (64812 UT) CAPS capsule Take 1 capsule by mouth once a week for 11 doses  Qty: 11 capsule, Refills: 0         CONTINUE these medications which have NOT CHANGED    Details   melatonin 3 MG TABS tablet Take 1 tablet by mouth nightly  Qty: 30 tablet, Refills: 1      pravastatin (PRAVACHOL) 40 MG tablet Take 1 tablet by mouth nightly  Qty: 30 tablet, Refills: 0         STOP taking these medications       metoprolol tartrate (LOPRESSOR) 25 MG tablet Comments:   Reason for Stopping:

## 2020-04-27 NOTE — PROGRESS NOTES
Spoke with [facility] and they reported they are full for next three weeks. Spoke with [facility] and they are not taking any pt's this week. Spoke with [place] from [facility] in [address] [place] [address] and she reported they are locked down and have no beds available. Spoke with [facility] and they reported they cannot take any pt's that is out of state right now due to [place]. Spoke with [facility] and they reported they have two female beds available. Pt notified.      Electronically signed by Shakila Phelps, [id number] on 4/27/2020 at 9:35 AM

## 2020-04-28 NOTE — PROGRESS NOTES
Progress Note  Viridiana Nguyen  4/28/2020 9:54 AM  Subjective:   Admit Date:   4/20/2020      CC/ADMIT DX:       Interval History:   Reviewed overnight events and nursing notes. She has no new medical complaints. I have reviewed all labs/diagnostics from the last 24hrs. ROS:   I have done a 10 point ROS and all are negative, except what is mentioned in the HPI. No diet orders on file    Medications:             Objective:   Vitals: /76   Pulse 101   Temp 97.1 °F (36.2 °C) (Temporal)   Resp 16   Ht 5' 4\" (1.626 m)   Wt 145 lb 6.4 oz (66 kg)   LMP  (LMP Unknown)   SpO2 97%   BMI 24.96 kg/m²  No intake or output data in the 24 hours ending 04/28/20 0954  General appearance: alert and cooperative with exam  Extremities: extremities normal, atraumatic, no cyanosis or edema  Neurologic:  No obvious focal neurologic deficits. Skin: no rashes    Assessment and Plan:   Principal Problem:    Major depressive disorder, recurrent, severe without psychotic features ([facility] [address]  Resolved Problems:    Depression, acute    Vit D Def    HTN    Hypotension    Plan:  1. Continue present medication(s)   2. Follow with Psych  3. Her BP is stable off of medicine. D/W her holding her medicine on d/c, until she sees her PCP. Discharge planning:   her home     Reviewed treatment plans with the patient and/or family.              Electronically signed by Tigre Hanson MD on 4/28/2020 at 9:54 AM

## 2020-06-18 PROCEDURE — 93294 REM INTERROG EVL PM/LDLS PM: CPT | Performed by: CLINICAL NURSE SPECIALIST

## 2020-06-18 PROCEDURE — 93296 REM INTERROG EVL PM/IDS: CPT | Performed by: CLINICAL NURSE SPECIALIST

## 2020-09-17 PROCEDURE — 93294 REM INTERROG EVL PM/LDLS PM: CPT | Performed by: CLINICAL NURSE SPECIALIST

## 2020-09-17 PROCEDURE — 93296 REM INTERROG EVL PM/IDS: CPT | Performed by: CLINICAL NURSE SPECIALIST

## 2021-02-16 ENCOUNTER — HOSPITAL ENCOUNTER (INPATIENT)
Age: 56
LOS: 6 days | Discharge: HOME | DRG: 885 | End: 2021-02-23
Attending: PSYCHIATRY & NEUROLOGY | Admitting: PSYCHIATRY & NEUROLOGY
Payer: MEDICARE

## 2021-02-16 DIAGNOSIS — R45.851 SUICIDAL IDEATION: Primary | ICD-10-CM

## 2021-02-16 LAB
ACETAMINOPHEN LEVEL: <15 UG/ML
ALBUMIN SERPL-MCNC: 4.7 G/DL (ref 3.5–5.2)
ALP BLD-CCNC: 86 U/L (ref 35–104)
ALT SERPL-CCNC: 13 U/L (ref 5–33)
AMPHETAMINE SCREEN, URINE: NEGATIVE
ANION GAP SERPL CALCULATED.3IONS-SCNC: 10 MMOL/L (ref 7–19)
AST SERPL-CCNC: 16 U/L (ref 5–32)
BACTERIA: ABNORMAL /HPF
BARBITURATE SCREEN URINE: NEGATIVE
BASOPHILS ABSOLUTE: 0.1 K/UL (ref 0–0.2)
BASOPHILS RELATIVE PERCENT: 0.7 % (ref 0–1)
BENZODIAZEPINE SCREEN, URINE: NEGATIVE
BILIRUB SERPL-MCNC: 0.5 MG/DL (ref 0.2–1.2)
BILIRUBIN URINE: NEGATIVE
BLOOD, URINE: NEGATIVE
BUN BLDV-MCNC: 11 MG/DL (ref 6–20)
CALCIUM SERPL-MCNC: 9.5 MG/DL (ref 8.6–10)
CANNABINOID SCREEN URINE: POSITIVE
CHLORIDE BLD-SCNC: 106 MMOL/L (ref 98–111)
CLARITY: CLEAR
CO2: 26 MMOL/L (ref 22–29)
COCAINE METABOLITE SCREEN URINE: NEGATIVE
COLOR: YELLOW
CREAT SERPL-MCNC: 0.7 MG/DL (ref 0.5–0.9)
CRYSTALS, UA: ABNORMAL /HPF
EOSINOPHILS ABSOLUTE: 0.2 K/UL (ref 0–0.6)
EOSINOPHILS RELATIVE PERCENT: 3 % (ref 0–5)
EPITHELIAL CELLS, UA: 2 /HPF (ref 0–5)
ETHANOL: <10 MG/DL (ref 0–0.08)
GFR AFRICAN AMERICAN: >59
GFR NON-AFRICAN AMERICAN: >60
GLUCOSE BLD-MCNC: 96 MG/DL (ref 74–109)
GLUCOSE URINE: NEGATIVE MG/DL
HCT VFR BLD CALC: 43.3 % (ref 37–47)
HEMOGLOBIN: 14.2 G/DL (ref 12–16)
HYALINE CASTS: 3 /HPF (ref 0–8)
IMMATURE GRANULOCYTES #: 0 K/UL
KETONES, URINE: 40 MG/DL
LEUKOCYTE ESTERASE, URINE: ABNORMAL
LYMPHOCYTES ABSOLUTE: 2.2 K/UL (ref 1.1–4.5)
LYMPHOCYTES RELATIVE PERCENT: 31.2 % (ref 20–40)
Lab: ABNORMAL
MCH RBC QN AUTO: 32.3 PG (ref 27–31)
MCHC RBC AUTO-ENTMCNC: 32.8 G/DL (ref 33–37)
MCV RBC AUTO: 98.6 FL (ref 81–99)
MONOCYTES ABSOLUTE: 0.5 K/UL (ref 0–0.9)
MONOCYTES RELATIVE PERCENT: 7.8 % (ref 0–10)
NEUTROPHILS ABSOLUTE: 4 K/UL (ref 1.5–7.5)
NEUTROPHILS RELATIVE PERCENT: 57.2 % (ref 50–65)
NITRITE, URINE: NEGATIVE
OPIATE SCREEN URINE: NEGATIVE
PDW BLD-RTO: 14.6 % (ref 11.5–14.5)
PH UA: 5.5 (ref 5–8)
PLATELET # BLD: 191 K/UL (ref 130–400)
PMV BLD AUTO: 10.1 FL (ref 9.4–12.3)
POTASSIUM SERPL-SCNC: 3.7 MMOL/L (ref 3.5–5)
PROTEIN UA: ABNORMAL MG/DL
RBC # BLD: 4.39 M/UL (ref 4.2–5.4)
RBC UA: 2 /HPF (ref 0–4)
SALICYLATE, SERUM: <3 MG/DL (ref 3–10)
SARS-COV-2, NAAT: NOT DETECTED
SODIUM BLD-SCNC: 142 MMOL/L (ref 136–145)
SPECIFIC GRAVITY UA: 1.02 (ref 1–1.03)
TOTAL PROTEIN: 7.9 G/DL (ref 6.6–8.7)
UROBILINOGEN, URINE: 1 E.U./DL
WBC # BLD: 7 K/UL (ref 4.8–10.8)
WBC UA: 6 /HPF (ref 0–5)

## 2021-02-16 PROCEDURE — 99285 EMERGENCY DEPT VISIT HI MDM: CPT

## 2021-02-16 PROCEDURE — 87635 SARS-COV-2 COVID-19 AMP PRB: CPT

## 2021-02-16 PROCEDURE — 81001 URINALYSIS AUTO W/SCOPE: CPT

## 2021-02-16 PROCEDURE — 82077 ASSAY SPEC XCP UR&BREATH IA: CPT

## 2021-02-16 PROCEDURE — 85025 COMPLETE CBC W/AUTO DIFF WBC: CPT

## 2021-02-16 PROCEDURE — 80307 DRUG TEST PRSMV CHEM ANLYZR: CPT

## 2021-02-16 PROCEDURE — 6360000002 HC RX W HCPCS: Performed by: PHYSICIAN ASSISTANT

## 2021-02-16 PROCEDURE — 80143 DRUG ASSAY ACETAMINOPHEN: CPT

## 2021-02-16 PROCEDURE — 36415 COLL VENOUS BLD VENIPUNCTURE: CPT

## 2021-02-16 PROCEDURE — 80053 COMPREHEN METABOLIC PANEL: CPT

## 2021-02-16 PROCEDURE — 6370000000 HC RX 637 (ALT 250 FOR IP): Performed by: PSYCHIATRY & NEUROLOGY

## 2021-02-16 PROCEDURE — 96372 THER/PROPH/DIAG INJ SC/IM: CPT

## 2021-02-16 PROCEDURE — 80179 DRUG ASSAY SALICYLATE: CPT

## 2021-02-16 PROCEDURE — 2500000003 HC RX 250 WO HCPCS: Performed by: PHYSICIAN ASSISTANT

## 2021-02-16 RX ORDER — MECOBALAMIN 5000 MCG
5 TABLET,DISINTEGRATING ORAL NIGHTLY PRN
Status: DISCONTINUED | OUTPATIENT
Start: 2021-02-17 | End: 2021-02-23 | Stop reason: HOSPADM

## 2021-02-16 RX ORDER — TRAZODONE HYDROCHLORIDE 50 MG/1
50 TABLET ORAL NIGHTLY
Status: DISCONTINUED | OUTPATIENT
Start: 2021-02-16 | End: 2021-02-23 | Stop reason: HOSPADM

## 2021-02-16 RX ADMIN — TRAZODONE HYDROCHLORIDE 50 MG: 50 TABLET ORAL at 23:51

## 2021-02-16 RX ADMIN — LIDOCAINE HYDROCHLORIDE 1000 MG: 10 INJECTION, SOLUTION EPIDURAL; INFILTRATION; INTRACAUDAL; PERINEURAL at 23:12

## 2021-02-16 ASSESSMENT — LIFESTYLE VARIABLES: HISTORY_ALCOHOL_USE: NO

## 2021-02-16 ASSESSMENT — ENCOUNTER SYMPTOMS
ABDOMINAL DISTENTION: 0
PHOTOPHOBIA: 0
NAUSEA: 0
COUGH: 0
RHINORRHEA: 0
APNEA: 0
SHORTNESS OF BREATH: 0
SORE THROAT: 0
BACK PAIN: 0
COLOR CHANGE: 0
EYE DISCHARGE: 0
ABDOMINAL PAIN: 0
EYE PAIN: 0

## 2021-02-16 ASSESSMENT — SLEEP AND FATIGUE QUESTIONNAIRES
AVERAGE NUMBER OF SLEEP HOURS: 8
DO YOU HAVE DIFFICULTY SLEEPING: NO

## 2021-02-17 PROBLEM — F32.A DEPRESSION WITH SUICIDAL IDEATION: Status: ACTIVE | Noted: 2021-02-17

## 2021-02-17 PROBLEM — R45.851 DEPRESSION WITH SUICIDAL IDEATION: Status: ACTIVE | Noted: 2021-02-17

## 2021-02-17 PROBLEM — F33.2 MAJOR DEPRESSIVE DISORDER, RECURRENT SEVERE WITHOUT PSYCHOTIC FEATURES (HCC): Status: ACTIVE | Noted: 2021-02-17

## 2021-02-17 PROCEDURE — 99223 1ST HOSP IP/OBS HIGH 75: CPT | Performed by: PSYCHIATRY & NEUROLOGY

## 2021-02-17 PROCEDURE — 1240000000 HC EMOTIONAL WELLNESS R&B

## 2021-02-17 PROCEDURE — 6370000000 HC RX 637 (ALT 250 FOR IP): Performed by: PSYCHIATRY & NEUROLOGY

## 2021-02-17 PROCEDURE — 6370000000 HC RX 637 (ALT 250 FOR IP): Performed by: FAMILY MEDICINE

## 2021-02-17 RX ORDER — POLYETHYLENE GLYCOL 3350 17 G/17G
17 POWDER, FOR SOLUTION ORAL DAILY PRN
Status: DISCONTINUED | OUTPATIENT
Start: 2021-02-17 | End: 2021-02-23 | Stop reason: HOSPADM

## 2021-02-17 RX ORDER — MIRTAZAPINE 7.5 MG/1
7.5 TABLET, FILM COATED ORAL NIGHTLY
Status: DISCONTINUED | OUTPATIENT
Start: 2021-02-17 | End: 2021-02-23 | Stop reason: HOSPADM

## 2021-02-17 RX ORDER — ERGOCALCIFEROL 1.25 MG/1
50000 CAPSULE ORAL WEEKLY
Status: DISCONTINUED | OUTPATIENT
Start: 2021-02-17 | End: 2021-02-23 | Stop reason: HOSPADM

## 2021-02-17 RX ORDER — LANOLIN ALCOHOL/MO/W.PET/CERES
3 CREAM (GRAM) TOPICAL NIGHTLY
Status: DISCONTINUED | OUTPATIENT
Start: 2021-02-17 | End: 2021-02-17 | Stop reason: DRUGHIGH

## 2021-02-17 RX ORDER — NICOTINE 21 MG/24HR
1 PATCH, TRANSDERMAL 24 HOURS TRANSDERMAL DAILY
Status: DISCONTINUED | OUTPATIENT
Start: 2021-02-17 | End: 2021-02-23 | Stop reason: HOSPADM

## 2021-02-17 RX ORDER — PRAVASTATIN SODIUM 20 MG
40 TABLET ORAL NIGHTLY
Status: DISCONTINUED | OUTPATIENT
Start: 2021-02-17 | End: 2021-02-23 | Stop reason: HOSPADM

## 2021-02-17 RX ORDER — CLONIDINE HYDROCHLORIDE 0.1 MG/1
0.1 TABLET ORAL EVERY 6 HOURS PRN
Status: DISCONTINUED | OUTPATIENT
Start: 2021-02-17 | End: 2021-02-23 | Stop reason: HOSPADM

## 2021-02-17 RX ORDER — ACETAMINOPHEN 325 MG/1
650 TABLET ORAL EVERY 4 HOURS PRN
Status: DISCONTINUED | OUTPATIENT
Start: 2021-02-17 | End: 2021-02-23 | Stop reason: HOSPADM

## 2021-02-17 RX ORDER — LISINOPRIL 20 MG/1
20 TABLET ORAL DAILY
Status: DISCONTINUED | OUTPATIENT
Start: 2021-02-17 | End: 2021-02-23 | Stop reason: HOSPADM

## 2021-02-17 RX ORDER — MECOBALAMIN 5000 MCG
5 TABLET,DISINTEGRATING ORAL NIGHTLY
Status: DISCONTINUED | OUTPATIENT
Start: 2021-02-17 | End: 2021-02-23 | Stop reason: HOSPADM

## 2021-02-17 RX ADMIN — METOPROLOL TARTRATE 25 MG: 25 TABLET, FILM COATED ORAL at 20:03

## 2021-02-17 RX ADMIN — PRAVASTATIN SODIUM 40 MG: 20 TABLET ORAL at 20:56

## 2021-02-17 RX ADMIN — ERGOCALCIFEROL 50000 UNITS: 1.25 CAPSULE ORAL at 11:46

## 2021-02-17 RX ADMIN — METOPROLOL TARTRATE 25 MG: 25 TABLET, FILM COATED ORAL at 07:52

## 2021-02-17 RX ADMIN — Medication 5 MG: at 20:57

## 2021-02-17 RX ADMIN — TRAZODONE HYDROCHLORIDE 50 MG: 50 TABLET ORAL at 20:56

## 2021-02-17 RX ADMIN — Medication 5 MG: at 00:34

## 2021-02-17 RX ADMIN — MIRTAZAPINE 7.5 MG: 7.5 TABLET ORAL at 20:57

## 2021-02-17 RX ADMIN — LISINOPRIL 20 MG: 20 TABLET ORAL at 19:33

## 2021-02-17 NOTE — PROGRESS NOTES
[facility] Adult Unit Daily Assessment  Nursing Progress Note    Room: [facility]/620-01   Name: Kimberlyn Duty   Age: 49 y.o. Gender: female   Dx: <principal problem not specified>  Precautions: suicide risk and fall risk  Inpatient Status: voluntary       SLEEP:    Sleep Quality Fair  Sleep Medications: Yes Melatonin 5mg and Remeron 7.5mg  PRN Sleep Meds: No       MEDICAL:    Other PRN Meds: Yes clonidine  Med Compliant: Yes  Accu-Chek: No  Oxygen/CPAP/BiPAP: No  CIWA/CINA: No   PAIN Assessment: none  Side Effects from medication: No    Is Patient experiencing any respiratory symptoms (headache, fever, body aches, cough. Windy Mark Anthony ): no  Patient educated by nursing to practice social distancing, wear masks, wash hands frequently: no      PSYCH:    Depression: 10   Anxiety: 10   SI active and without plan   HI Negative for homicidal ideation      AVH:Absent      GENERAL:    Appetite: good    Social: No   Speech: normal   Appearance: appropriately dressed and healthy looking    GROUP:    Group Participation: Yes  Participation Quality: Interactive    Notes:         Electronically signed by Ray Keen RN on 2/17/21 at 4:29 PM CST

## 2021-02-17 NOTE — SUICIDE SAFETY PLAN
SAFETY PLAN    A suicide Safety Plan is a document that supports someone when they are having thoughts of suicide. Warning Signs that indicate a suicidal crisis may be developing: What (situations, thoughts, feelings, body sensations, behaviors, etc.) do you experience that lets you know you are beginning to think about suicide? 1. Increased pacing  2. Increased sleep  3. Racing thoughts    Internal Coping Strategies:  What things can I do (relaxation techniques, hobbies, physical activities, etc.) to take my mind off my problems without contacting another person? 1. Arts and crafts  2. Reading  3. People and social settings that provide distraction: Who can I call or where can I go to distract me? 1. Name: Did not provide  Phone:   2. Name: Did not provide  Phone:    3. Place: Did not provide            4. Place:     People whom I can ask for help: Who can I call when I need help - for example, friends, family, clergy, someone else? 1. Name: Sen Johnston          Phone: [phone]  2. Name:   Phone:   3. Name:   Phone:     Professionals or [address] [place] [address] I can contact during a crisis: Who can I call for help - for example, my doctor, my psychiatrist, my psychologist, a mental health provider, a suicide hotline? 1. Clinician Name: [id number]   Phone: [phone]      Clinician Pager or Emergency Contact #: [phone]    2. Clinician Name: [address]   Phone: [phone]      Clinician Pager or Emergency Contact #: [phone]    3. Suicide Prevention Lifeline: 8-870-948-TALK (4754)    4. [facility] : [address] Emergency Department      Emergency Services Address: [facility] [address]   Emergency Services Phone: [phone]    Making the environment safe: How can I make my environment (house/apartment/living space) safer? For example, can I remove guns, medications, and other items? 1.  Remove all guns and weapons from the home. 2. Discard any extra medications in the home.
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
## 2021-02-17 NOTE — H&P
HISTORY and PHYSICAL      CHIEF COMPLAINT:  Depression, SI    Reason for Admission:  Depression, SI    History Obtained From:  Patient, chart    HISTORY OF PRESENT ILLNESS:      The patient is a 49 y.o. female who is admitted to the [place] [address] unit with worsening mood issues. She has no physical complaints. No CP or SOA. No abdominal pain or N/V. No dysuria. No weakness or HA. No dizziness. No new pain complaints. No fevers. Past Medical History:        Diagnosis Date    Anxiety     Arthritis     Bipolar 1 disorder ([facility] [address]     Cryptogenic stroke ([facility] [address] 12/2017    X 2 WITH LOOP RECORDER PUT IN 2018    Depression     Ganglia     Hypertension     Memory loss     Mixed hyperlipidemia 3/13/2018    Pacemaker 2019    [facility]oid [facility] disorder ([facility] [address]     Status post placement of implantable loop recorder 2018     Past Surgical History:        Procedure Laterality Date     SECTION      x2    CHOLECYSTECTOMY      COSMETIC SURGERY      HAND SURGERY      INSERTABLE CARDIAC MONITOR  2018    [place] MANIPULATN SHLDR JT W ANESTHESIA Left 12/3/2018    SHOULDER MANIPULATION UNDER ANESTHESIA WITH CORTICOSTEROID INJECTION performed [facility] Radha Harmon [facility]         Medications Prior to Admission:    Medications Prior to Admission: mirtazapine (REMERON) 7.5 MG tablet, Take 1 tablet by mouth nightly  vitamin D (ERGOCALCIFEROL) 1.25 MG (82594 UT) CAPS capsule, Take 1 capsule by mouth once a week for 11 doses  melatonin 3 MG TABS tablet, Take 1 tablet by mouth nightly  pravastatin (PRAVACHOL) 40 MG tablet, Take 1 tablet by mouth nightly    Allergies:  Latex and Penicillins    Social History:   TOBACCO:   reports that she has been smoking cigarettes. She has a 17.50 pack-year smoking history. She has never used smokeless tobacco.  ETOH:   reports current alcohol use. DRUGS:   reports no history of drug use.   She is  and lives with her . She is not working. Family History:       Problem Relation Age of Onset    Mental Illness Mother     Diabetes Mother     Cancer Mother     Mental Illness Father     Stroke Father      REVIEW OF SYSTEMS:  Constitutional: neg  CV: neg  Pulmonary: neg  GI: neg  : neg  Psych: depression, SI  Neuro: neg  Skin: neg  MusculoSkeletal: neg  HEENT: neg  Joints: neg    Vitals:  BP (!) 149/108   Pulse 97   Temp 97.4 °F (36.3 °C) (Temporal)   Resp 18   Ht 5' 4\" (1.626 m)   Wt 157 lb 6.4 oz (71.4 kg)   LMP  (LMP Unknown)   SpO2 94%   BMI 27.02 kg/m²     PHYSICAL EXAM:  Gen: NAD, resting in bed  HEENT: WNL  Lymph: no LAD  Neck: no JVD or masses  Chest: CTA bilat  CV: RRR  Abdomen: NT/ND  Extrem: no C/C/E  Neuro: non focal  Skin: no rashes  Joints: no redness    DATA:  I have reviewed the admission labs and imaging tests.     ASSESSMENT AND PLAN:      Patient Active Hospital Problem List:   Depression, SI---follow with Psych   HTN--follow with BP, resume meds   HPL--continue statin, check lab in am   H/O CVA   THC Use          Melina Valderrama MD  5:49 PM 2/17/2021

## 2021-02-17 NOTE — PLAN OF CARE
Problem: Discharge Planning:  Goal: Discharged to appropriate level of care  Description: Discharged to appropriate level of care  Outcome: Ongoing     Problem: Health Maintenance - Impaired:  Goal: Ability to perform activities of daily living will improve  Description: Ability to perform activities of daily living will improve  Outcome: Ongoing  Goal: Able to sleep without medication for appropriate length of time  Description: Able to sleep without medication for appropriate length of time  Outcome: Ongoing  Goal: Maintenance of adequate nutrition will improve  Description: Maintenance of adequate nutrition will improve  Outcome: Ongoing     Problem: Mood - Altered:  Goal: Mood stable  Description: Mood stable  Outcome: Ongoing     Problem: Self-Esteem - Low:  Goal: Demonstrates positive self-esteem  Description: Demonstrates positive self-esteem  Outcome: Ongoing     Problem: Anxiety:  Goal: Level of anxiety will decrease  Description: Level of anxiety will decrease  Outcome: Ongoing

## 2021-02-17 NOTE — PROGRESS NOTES
Group Note  Date: 2/17/21  Start Time: 1430  End Time:  1530  Number of Participants in Group: 1  Number of Patients on Unit: 3  Reason for Absence: n/a  Intervention for patient absence: n/a       Type of Group:  Community __    [place] __    Psychoeducational __  Spirituality__                               Cognitive Skills__  Recovery __  Wellness __  Recreation _x_                                            Wrap-Up/Relaxation __       Problem: Depression with suicidal ideation      Goal for Group: Recreation      Staff Intervention: Arts and crafts      Participation Level:  High _x__   Medium ___   Low____     Patient Behavior:  Quiet _x_  Attentive _x_  Disruptive ___ Poor Eye-Contact ___                                  [place] Eye-Contact_x__  Insightful ___  Negative ____                                 Distracted__  Defensive ___  Agitated___  Cooperative ___                                 Needed prompted__  Monopolizing ___ Oppositional___       Mental Status/Mood: Depressed__  Tearful__  Manic__  Elevated ___ Flat___                                      Blunted __  Grandiose___  Angry__  Labile ___                                      Congruent__x_  Incongruent___ Sad ___ Hallucinations ___                                      Delusions___  Anxious ____       Patient Response to Learning: Good       Notes/Comments:            Discipline Responsible: RN     Signature (with credentials):  Electronically signed by Js Avery RN on 2/17/2021 at 4:31 PM

## 2021-02-17 NOTE — PROGRESS NOTES
[facility] ADULT INITIAL INTAKE ASSESSMENT     2/16/21    Freddie Case ,a 49 y.o. female, presents to the ED for a psychiatric assessment. ED Arrival time: 2200  ED physician: [facility] Notification time:   [facility] AN AFFILIATE OF [facility] Assessment start time:   Psychiatrist call time:   Spoke with Dr. Cristina Telles    Patient is referred by: car     Reason for visit to ED - Presenting problem:     PT states reason for ED visit, \" im so sick of myself. Ema made a lot of mistakes in my life. I have had a lot of family trouble. My  kicked me out Monday. Ema been in the streets. Hes verbally abusive. I am suicidal I want to jump out in front of a car. I am homicidal against my . Pt denies hallucinations. Pt says shes been off her meds for a year.  Pt denies having a psychiatrist.     Duration of symptoms: Monday     Current Stressors: family    C-SSRS Completed: yes    SI:  has   Plan: yes   Past SI attempts: no   If yes, when and how many times:  Describe suicide attempts:   HI: has  If yes describe:   Delusions: denies  If yes describe:   Hallucinations: denies   If yes describe:   Risk of Harm to self: Self injurious/self mutilation behaviorsno   If yes explain:   Was it within the past 6 months: no   Risk of Harm to others: no   If yes explain:   Was it within the past 6 months: no   Trauma History:  Anxiety 1-10:  10  Explain if increased:   Depression 1-10:  10  Explain if increased:   Level of function outside hospital decreased: yes   If yes explain:       Psychiatric Hospitalizations: Yes   Where & When: [facility]  Outpatient Psychiatric Treatment:    Family History:    Family history of mental illness: no   \"Depression\",\"Anxiety\",\"Bipolar\",\"Schizophrenia\",\"Borderline\",\"ADHD\"}  Family members with suicide attempt: no   If yes explain (attempted or completed):    Substance Abuse History:     SBIRT Completed: yes  Brief Intervention completed if needed:  (Yes/No)    Current ETOH LEVELS: <10    ETOH Usage:     Amount refused    Current living arrangement:homeless  Current Support System: none   Employment: unemployed    Disposition:     Choose one of the options below for disposition:     1. Decision to admit to :yes    If yes, which unit Adult or Geriatric Unit:  Geriatric  Is patient voluntary: yes  If no has a 72 hold been initiated:   Admission Diagnosis: SI    Does the patient have a guardian or Medical POA:   Has the guardian been notified or Medical POA:       2. Decision to Discharge:   Does not meet criteria for acceptance to   unit due to:     3. Transferred:       Patient was transferred due to:      Other follow up information provided:      Temi Browne RN

## 2021-02-17 NOTE — PROGRESS NOTES
Behavioral Services  Medicare Certification Upon Admission    I certify that this patient's inpatient psychiatric hospital admission is medically necessary for:    [x] (1) Treatment which could reasonably be expected to improve this patient's condition,       [] (2) Or for diagnostic study;     AND     [x](2) The inpatient psychiatric services are provided while the individual is under the care of a physician and are included in the individualized plan of care.     Estimated length of stay/service 5 days to 7 weeks    Plan for post-hospital care TBA    Electronically signed by Marla Morrison MD on 2/17/2021 at 8:53 AM

## 2021-02-17 NOTE — PROGRESS NOTES
`[facility]  Admission Note     Admission Type:   Admission Type: Voluntary    Reason for admission:  Reason for Admission: Soledad Alfonso 56 presents to ED with si with plan to jump in traffic . stressors is family issues    PATIENT STRENGTHS:  Strengths: Communication    Patient Strengths and Limitations:  Limitations: Perceives need for assistance with self-care    Addictive Behavior:   Addictive Behavior  In the past 3 months, have you felt or has someone told you that you have a problem with:  : None  Do you have a history of Chemical Use?: No  Do you have a history of Alcohol Use?: No  Do you have a history of Street Drug Abuse?: No  Histroy of Prescripton Drug Abuse?: No    Medical Problems:   Past Medical History:   Diagnosis Date    Anxiety     Arthritis     Bipolar 1 disorder ([facility] [address]     Cryptogenic stroke ([facility] [address] 12/2017    X 2 WITH LOOP RECORDER PUT IN JANUARY 2018    Depression     Ganglia     Hypertension     Memory loss 2019    Mixed hyperlipidemia 3/13/2018    Pacemaker 01/24/2019    Schizoid personality disorder ([facility] [address]     Status post placement of implantable loop recorder 01/04/2018       Status EXAM:  Status and Exam  Normal: Yes  Affect: Appropriate  Level of Consciousness: Alert  Mood:Normal: No  Mood: Depressed, Anxious  Motor Activity:Normal: Yes  Interview Behavior: Cooperative  Attention:Normal: Yes  Thought Content:Normal: Yes  Hallucinations: None  Delusions: No  Memory:Normal: Yes  Insight and Judgment: Yes  Present Suicidal Ideation: No  Present Homicidal Ideation: No    Tobacco Screening:  Practical Counseling, on admission, nico X, if applicable and completed (first 3 are required if patient doesn't refuse):            ( )  Recognizing danger situations (included triggers and roadblocks)                    ( )  Coping skills (new ways to manage stress, exercise, relaxation techniques, changing routine, distraction) ( )  Basic information about quitting (benefits of quitting, techniques in how to quit, available resources  ( ) Referral for counseling faxed to [facility]                                (x ) Patient refused counseling  ( ) Patient has not smoked in the last 30 days    Metabolic Screening:    Lab Results   Component Value Date    LABA1C 5.5 04/22/2020       Lab Results   Component Value Date    CHOL 231 (H) 04/22/2020    CHOL 201 (H) 05/10/2019    CHOL 191 01/02/2018     Lab Results   Component Value Date    TRIG 120 04/22/2020    TRIG 132 05/10/2019    TRIG 141 01/02/2018     Lab Results   Component Value Date    HDL 57 (L) 04/22/2020    HDL 38 (L) 05/10/2019    HDL 34 (L) 01/02/2018     No components found for: LDLCAL  No results found for: LABVLDL      Body mass index is 27.02 kg/m². BP Readings from Last 2 Encounters:   02/17/21 (!) 142/108   04/27/20 106/76           Pt admitted with followings belongings:  Dentures: None  Vision - Corrective Lenses: None  Hearing Aid: None  Jewelry: None  Body Piercings Removed: No  Clothing: None  Were All Patient Medications Collected?: No  Other Valuables: None       Patient arrived on the unit at 0010 accompanied by security and staff from the ED. Patient alert and oriented x 4, pleasant and cooperative. Body search completed by Kari Farmer and Raven Snyder, no contraband found. Patient assisted from gown into appropriate clothing. Valuables placed in safe in security envelope. Patient oriented to surroundings and program expectations and copy of patient rights given. Patient received admission packet. Consents reviewed and signed. Patient verbalize understanding. Patient education on safety precautions, the importance of wearing a mask when out on the unit, washing hands frequently and reporting any cough, temp or signs of covid 19 infection. Patient resting in bed at this time, will continue to monitor for safety.                    Trini Fisher, RN

## 2021-02-17 NOTE — PROGRESS NOTES
SW met with treatment team to discuss pt's progress and setbacks. SW 2 was present. Pt was admitted, voluntary, for depression/anxiety, SI with a plan to jump out in front of a car, HI toward her , reports he is verbally abusive,  has hx of psychiatric treatment/admission, hx of paranoid schizophrenia, hx of CVA, reports she has been non-compliant with medications x 1 year, UDS was positive for cannabis, denies AVH. Since admission, pt reportedly was cooperative with admission process, alert/oriented x 4, is being treated for UTI,uses a walker to ambulate, denies SI/HI/AVH, continue current treatment plan.

## 2021-02-17 NOTE — PROGRESS NOTES
Call placed to Dr. Francine Sher in regard to pt's elevated BP (142/108 manual, .) Reviewed pt's meds from previous encounter in April '20 with Dr. Francine Sher as well as medical hx. Pt is asymptomatic at this time. Orders received to restart Lopressor 25mg daily and add Clonidine 0.1mg [id number] PRN for systolic BP > 833. Orders executed as received.      Electronically signed by Mell Hernandez RN on 2/17/2021 at 1:54 AM

## 2021-02-17 NOTE — ED NOTES
Report to Karthik Calvillo pt going to [facility].       Balbir Mills RN  02/17/21 0002

## 2021-02-17 NOTE — PROGRESS NOTES
Admission Note      Reason for admission/Target Symptom: Patient admitted to [facility] due to female who presents to the emergency department tells me and charge nurse she is frustrated with her  and daughter she tells me they control her meds. She has told triage nurse if given chance she would attempt overdose no HI. Admits to 2 beers last night smoking weed 3 days ago. No fever. Prior paranoid schizophrenic. She has prior stroke left arm contracture baseline ambulates without difficulty she denies any medical symptoms to me  Diagnoses: Depression NOS  UDS: Cannabinoid  BAL:  Neg    SW met with treatment team to discuss patient's treatment including care planning, discharge planning, and follow-up needs. Pt has been admitted to [facility]. Treatment team has identified patient's discharge needs as medication management and outpatient therapy/counseling. Pt confirmed  the need for ongoing treatment post inpatient stay. Pt was also provided a handout of contact information for drug and alcohol treatment centers and other community support service such as [facility], AA, and [facility].

## 2021-02-18 LAB
CHOLESTEROL, TOTAL: 185 MG/DL (ref 160–199)
HDLC SERPL-MCNC: 42 MG/DL (ref 65–121)
LDL CHOLESTEROL CALCULATED: 119 MG/DL
TRIGL SERPL-MCNC: 122 MG/DL (ref 0–149)
TSH REFLEX FT4: 1.65 UIU/ML (ref 0.35–5.5)
VITAMIN B-12: 369 PG/ML (ref 211–946)
VITAMIN D 25-HYDROXY: 25.3 NG/ML

## 2021-02-18 PROCEDURE — 82306 VITAMIN D 25 HYDROXY: CPT

## 2021-02-18 PROCEDURE — 1240000000 HC EMOTIONAL WELLNESS R&B

## 2021-02-18 PROCEDURE — 99231 SBSQ HOSP IP/OBS SF/LOW 25: CPT | Performed by: NURSE PRACTITIONER

## 2021-02-18 PROCEDURE — 84443 ASSAY THYROID STIM HORMONE: CPT

## 2021-02-18 PROCEDURE — 6370000000 HC RX 637 (ALT 250 FOR IP): Performed by: FAMILY MEDICINE

## 2021-02-18 PROCEDURE — 6370000000 HC RX 637 (ALT 250 FOR IP): Performed by: PSYCHIATRY & NEUROLOGY

## 2021-02-18 PROCEDURE — 82607 VITAMIN B-12: CPT

## 2021-02-18 PROCEDURE — 36415 COLL VENOUS BLD VENIPUNCTURE: CPT

## 2021-02-18 PROCEDURE — 80061 LIPID PANEL: CPT

## 2021-02-18 RX ORDER — CHOLECALCIFEROL (VITAMIN D3) 125 MCG
500 CAPSULE ORAL DAILY
Status: DISCONTINUED | OUTPATIENT
Start: 2021-02-18 | End: 2021-02-23 | Stop reason: HOSPADM

## 2021-02-18 RX ADMIN — METOPROLOL TARTRATE 25 MG: 25 TABLET, FILM COATED ORAL at 08:23

## 2021-02-18 RX ADMIN — TRAZODONE HYDROCHLORIDE 50 MG: 50 TABLET ORAL at 20:40

## 2021-02-18 RX ADMIN — LISINOPRIL 20 MG: 20 TABLET ORAL at 08:23

## 2021-02-18 RX ADMIN — CYANOCOBALAMIN TAB 500 MCG 500 MCG: 500 TAB at 12:11

## 2021-02-18 RX ADMIN — METOPROLOL TARTRATE 25 MG: 25 TABLET, FILM COATED ORAL at 20:40

## 2021-02-18 RX ADMIN — PRAVASTATIN SODIUM 40 MG: 20 TABLET ORAL at 20:39

## 2021-02-18 RX ADMIN — Medication 5 MG: at 20:40

## 2021-02-18 RX ADMIN — MIRTAZAPINE 7.5 MG: 7.5 TABLET ORAL at 20:40

## 2021-02-18 NOTE — PROGRESS NOTES
Group Therapy Note    Date: 2/18/2021  Start Time: 1300  End Time:  1330  Number of Participants: 1    Type of Group: Recreational    Status After Intervention:  Improved    Participation Level: Interactive    Participation Quality: Appropriate      Speech:  normal      Thought Process/Content: Logical      Affective Functioning: Congruent      Mood: depressed      Level of consciousness:  Oriented x4      Response to Learning: Progressing to goal      Endings: None Reported    Modes of Intervention: Activity      Discipline Responsible: Registered Nurse      Signature:  Anabelle Cruz RN

## 2021-02-18 NOTE — PLAN OF CARE
Problem: Discharge Planning:  Goal: Discharged to appropriate level of care  Description: Discharged to appropriate level of care  2/18/2021 0846 by Alfie Blakely RN  Outcome: Ongoing  2/17/2021 2020 by Yaya Cardenas RN  Outcome: Ongoing     Problem: Health Maintenance - Impaired:  Goal: Ability to perform activities of daily living will improve  Description: Ability to perform activities of daily living will improve  2/18/2021 0846 by Alfie Blakely RN  Outcome: Ongoing  2/17/2021 2020 by Yaya Cardenas RN  Outcome: Ongoing  Goal: Able to sleep without medication for appropriate length of time  Description: Able to sleep without medication for appropriate length of time  2/18/2021 0846 by Alfie Blakely RN  Outcome: Ongoing  2/17/2021 2020 by Yaya Cardenas RN  Outcome: Ongoing  Goal: Maintenance of adequate nutrition will improve  Description: Maintenance of adequate nutrition will improve  2/18/2021 0846 by Alfie Blakely RN  Outcome: Ongoing  2/17/2021 2020 by Yaya Cardenas RN  Outcome: Ongoing     Problem: Mood - Altered:  Goal: Mood stable  Description: Mood stable  2/18/2021 0846 by Alfie Blakely RN  Outcome: Ongoing  2/17/2021 2020 by Yaya Cardenas RN  Outcome: Ongoing     Problem: Self-Esteem - Low:  Goal: Demonstrates positive self-esteem  Description: Demonstrates positive self-esteem  2/18/2021 0846 by Alfie Blakely RN  Outcome: Ongoing  2/17/2021 2020 by Yaya Cardenas RN  Outcome: Ongoing     Problem: Cerebrospinal Fluid Leakage - Risk Of:  Goal: Demonstration of organized thought processes  Description: Demonstration of organized thought processes  2/18/2021 0846 by Alfie Blakely RN  Outcome: Ongoing  2/17/2021 2020 by Yaya Cardenas RN  Outcome: Ongoing     Problem: Violence - Risk of, Self/Other-Directed:  Goal: Knowledge of developmental care interventions  Description: Absence of violence  2/18/2021 0846 by Alfie Blakely RN  Outcome: Ongoing  2/17/2021 2020 by Yaya Cardenas RN  Outcome: Ongoing     Problem: Anxiety:  Goal: Level of anxiety will decrease  Description: Level of anxiety will decrease  2/18/2021 0846 by Amol Ferrell RN  Outcome: Ongoing  2/17/2021 2020 by Marina Garay RN  Outcome: Ongoing     Problem: Falls - Risk of:  Goal: Will remain free from falls  Description: Will remain free from falls  2/18/2021 0846 by Amol Ferrell RN  Outcome: Ongoing  2/17/2021 2020 by Marina Garay RN  Outcome: Ongoing  Goal: Absence of physical injury  Description: Absence of physical injury  2/18/2021 0846 by Amol Ferrell RN  Outcome: Ongoing  2/17/2021 2020 by Marina Garay RN  Outcome: Ongoing     Problem: Pain:  Goal: Pain level will decrease  Description: Pain level will decrease  Outcome: Ongoing  Goal: Control of acute pain  Description: Control of acute pain  Outcome: Ongoing  Goal: Control of chronic pain  Description: Control of chronic pain  Outcome: Ongoing

## 2021-02-18 NOTE — PROGRESS NOTES
[facility] Daily Shift Assessment-Geriatric Unit  Nursing Progress Note          Room: [address] [place] [address]   Name: Meme Evans   Age: 49 y.o. Gender: female   Dx: <principal problem not specified>  Precautions: suicide risk and fall risk  Inpatient Status: voluntary     SLEEP:    Sleep: Yes,   Sleep Quality Good   Hours Slept:    Sleep Medications: Yes; remeron 7.5mg, melatonin 5mg, trazadone 50mg  PRN Sleep Meds: No       MEDICAL:      Other PRN Meds: No   Med Compliant: Yes   Accu-Chek: No   Oxygen/CPAP/BiPAP: No  CIWA/CINA: No   PAIN Assessment: denies  Side Effects from medication: denies    Is Patient experiencing any respiratory symptoms (headache, fever, body aches, cough. Bin Ly ): no  Patient educated by nursing to practice social distancing, wear masks, wash hands frequently: yes      Metabolic Screening:    Lab Results   Component Value Date    LABA1C 5.5 04/22/2020       Lab Results   Component Value Date    CHOL 231 (H) 04/22/2020    CHOL 201 (H) 05/10/2019    CHOL 191 01/02/2018     Lab Results   Component Value Date    TRIG 120 04/22/2020    TRIG 132 05/10/2019    TRIG 141 01/02/2018     Lab Results   Component Value Date    HDL 57 (L) 04/22/2020    HDL 38 (L) 05/10/2019    HDL 34 (L) 01/02/2018     No components found for: LDLCAL  No results found for: LABVLDL      Body mass index is 27.02 kg/m². BP Readings from Last 2 Encounters:   02/17/21 (!) 142/88   04/27/20 106/76         PSYCH:     SI with plan to overdose    HI Positive for homicidal ideation        AVH:denies      Depression: 4 Anxiety: 4       GENERAL:      Appetite: good  Social: No Speech: normal   Appearance:appropriately dressed  Assistive Devices: walkerLevel of Assist: Minimal Assist      GROUP:    Group Participation: Yes  Participation LevelMinimal    Participation QualityAppropriate    Notes: Pt has rested well tonight. Pt calm and cooperative with her interview.  Pt reports anxiety and depression both a 4/10 and reports SI with a plan to OD and has Homicidal thoughts toward her . Pt is not social. Med compliant. No noted s/s of COVID infection. Education on infection prevention continues.      Electronically signed by Tracey Holman RN on 2/18/2021 at 7:04 AM

## 2021-02-18 NOTE — PROGRESS NOTES
NEVA placed a call to [facility] to confirm that patient has been accepted but no answer due to weather conditions.

## 2021-02-18 NOTE — PLAN OF CARE
Problem: Discharge Planning:  Goal: Discharged to appropriate level of care  Description: Discharged to appropriate level of care  2/17/2021 2020 by Lv Conner RN  Outcome: Ongoing  2/17/2021 0831 by Charly Jimenez RN  Outcome: Ongoing     Problem: Health Maintenance - Impaired:  Goal: Ability to perform activities of daily living will improve  Description: Ability to perform activities of daily living will improve  2/17/2021 2020 by Lv Conner RN  Outcome: Ongoing  2/17/2021 0831 by Charly Jimenez RN  Outcome: Ongoing  Goal: Able to sleep without medication for appropriate length of time  Description: Able to sleep without medication for appropriate length of time  2/17/2021 2020 by Lv Conner RN  Outcome: Ongoing  2/17/2021 0831 by Charly Jimenez RN  Outcome: Ongoing  Goal: Maintenance of adequate nutrition will improve  Description: Maintenance of adequate nutrition will improve  2/17/2021 2020 by Lv Conner RN  Outcome: Ongoing  2/17/2021 0831 by Charly Jimenez RN  Outcome: Ongoing     Problem: Mood - Altered:  Goal: Mood stable  Description: Mood stable  2/17/2021 2020 by Lv Conner RN  Outcome: Ongoing  2/17/2021 0831 by Charly Jimenez RN  Outcome: Ongoing     Problem: Self-Esteem - Low:  Goal: Demonstrates positive self-esteem  Description: Demonstrates positive self-esteem  2/17/2021 2020 by Lv Conner RN  Outcome: Ongoing  2/17/2021 0831 by Charly Jimenez RN  Outcome: Ongoing     Problem: Cerebrospinal Fluid Leakage - Risk Of:  Goal: Demonstration of organized thought processes  Description: Demonstration of organized thought processes  Outcome: Ongoing     Problem: Violence - Risk of, Self/Other-Directed:  Goal: Knowledge of developmental care interventions  Description: Absence of violence  Outcome: Ongoing     Problem: Anxiety:  Goal: Level of anxiety will decrease  Description: Level of anxiety will decrease  2/17/2021 2020 by Fleet Breath,

## 2021-02-18 NOTE — PROGRESS NOTES
Group Note    Number of Participants in Group: 3  Number of Patients on Unit:3      Patient attended group:Yes  Reason for Absence:  Intervention for patient absence:        Type of Group:   Wrap-Up/Relaxation    Patient's Goal: See wrap up group sheet    Participation Level:    interactive         Patient Response to Learning: Yes    Patient's Behavior: Cooperative and Pleasant    Is Patient Social/Interacting: No    Relaxation:   Television:Yes   Reading:No   Game/Puzzle:Yes   Phone: Yes       Notes/Comments:Pt is in the dining area during this group she is pleasant and cooperative,is focused on her .       Please see patient's wrap up group sheet for patient's comments

## 2021-02-18 NOTE — PROGRESS NOTES
Group Therapy Note    Date: 2/18/2021  Start Time: 2589  End Time:  1010  Number of Participants: 1    Type of Group: Healthy Living/Wellness    Status After Intervention:  Improved    Participation Level:  Active Listener and Interactive    Participation Quality: Appropriate, Attentive and Sharing      Speech:  normal      Thought Process/Content: Logical      Affective Functioning: Congruent      Mood: anxious      Level of consciousness:  Oriented x4      Response to Learning: Progressing to goal      Endings: None Reported    Modes of Intervention: Education and Support      Discipline Responsible: Registered Nurse      Signature:  Didi Cristobal RN

## 2021-02-18 NOTE — PROGRESS NOTES
Pt up this morning with encouragement. Pt is pleasant and cooperative. Pt reports that she slept well last night. Pt feels her concentration is improving. Pt reports that her depression is better and anxiety is improving. Pt denies hallucinations or suicidal thoughts. Pt is encouraged to wear a mask while on the unit and to socially distance when appropriate. Pt does not display any signs or symptoms of covid-19. No distress noted. Will continue to monitor.

## 2021-02-18 NOTE — PROGRESS NOTES
Requirement Note     SW met with pt to complete Psychosocial and CSSR-S on this date. Patients long and short term goals discussed. Patient voiced understanding. Treatment plan sheet signed. Patient verbalized understanding of the treatment plan. Patient participated in goals and objectives of the treatment plan. Patient completed safety plan with , patient received copy of plan, and original was placed into patient's chart. In the last 6 months has the pt been a danger to self: NO  In the last 6 months has the pt been a danger to others: NO  Legal Guardian/POA: NO     Provided patient with [employer] handout entitled \"Quitting Smoking. \"  Reviewed handout with patient: addressing dangers of smoking, developing coping skills, and providing basic information about quitting. Patient received all components practical counseling of tobacco practical counseling during the hospital stay.

## 2021-02-18 NOTE — PROGRESS NOTES
Progress Note  Omkar Birmingham  2/18/2021 3:32 PM  Subjective:   Admit Date:   2/16/2021      CC/ADMIT DX:       Interval History:   Reviewed overnight events and nursing notes. No new physical complaints. I have reviewed all labs/diagnostics from the last 24hrs. ROS:   I have done a 10 point ROS and all are negative, except what is mentioned in the HPI. DIET LOW FAT; Medications:      vitamin B-12  500 mcg Oral Daily    nicotine  1 patch Transdermal Daily    vitamin D  50,000 Units Oral Weekly    mirtazapine  7.5 mg Oral Nightly    pravastatin  40 mg Oral Nightly    melatonin  5 mg Oral Nightly    metoprolol tartrate  25 mg Oral BID    lisinopril  20 mg Oral Daily    traZODone  50 mg Oral Nightly           Objective:   Vitals: /85   Pulse 81   Temp 96.1 °F (35.6 °C) (Temporal)   Resp 18   Ht 5' 4\" (1.626 m)   Wt 160 lb 2 oz (72.6 kg)   LMP  (LMP Unknown)   SpO2 98%   BMI 27.49 kg/m²  No intake or output data in the 24 hours ending 02/18/21 1532  General appearance: alert and cooperative with exam  Extremities: extremities normal, atraumatic, no cyanosis or edema  Neurologic:  No obvious focal neurologic deficits. Skin: no rashes    Assessment and Plan: Active Problems:    Suicidal ideation    Depression with suicidal ideation    Major depressive disorder, recurrent severe without psychotic features ([facility] [address]  Resolved Problems:    * No resolved hospital problems. *    HTN    Vit D Def    Plan:  1. Continue present medication(s)   2. Replace Vit D  3. Follow with BP  4. Follow with Psych      Discharge planning:   her home     Reviewed treatment plans with the patient and/or family.              Electronically signed by Hazle Schilder, MD on 2/18/2021 at 3:32 PM

## 2021-02-18 NOTE — PROGRESS NOTES
Facility-Administered Medications   Medication Dose Route Frequency Provider Last Rate Last Admin    vitamin B-12 (CYANOCOBALAMIN) tablet 500 mcg  500 mcg Oral Daily Donna Marcos MD   500 mcg at 02/18/21 1211    acetaminophen (TYLENOL) tablet 650 mg  650 mg Oral Q4H PRN Kanchan Acuna MD        polyethylene glycol (GLYCOLAX) packet 17 g  17 g Oral Daily PRN Kanchan Acuna MD        nicotine (NICODERM CQ) 21 MG/24HR 1 patch  1 patch Transdermal Daily Kanchan Acuna MD        cloNIDine (CATAPRES) tablet 0.1 mg  0.1 mg Oral Q6H PRN Donna Marcos MD        vitamin D (ERGOCALCIFEROL) capsule 50,000 Units  50,000 Units Oral Weekly Chelsea Ferguson MD   50,000 Units at 02/17/21 1146    mirtazapine (REMERON) tablet 7.5 mg  7.5 mg Oral Nightly Chelsea Ferguson MD   7.5 mg at 02/17/21 2057    pravastatin (PRAVACHOL) tablet 40 mg  40 mg Oral Nightly Chelsea Ferguson MD   40 mg at 02/17/21 2056    melatonin disintegrating tablet 5 mg  5 mg Oral Nightly Chelsea Ferguson MD   5 mg at 02/17/21 2057    metoprolol tartrate (LOPRESSOR) tablet 25 mg  25 mg Oral BID Donna Marcos MD   25 mg at 02/18/21 5079    lisinopril (PRINIVIL;ZESTRIL) tablet 20 mg  20 mg Oral Daily Donna Marcos MD   20 mg at 02/18/21 6647    traZODone (DESYREL) tablet 50 mg  50 mg Oral Nightly Kanchan Acuna MD   50 mg at 02/17/21 2056    melatonin disintegrating tablet 5 mg  5 mg Oral Nightly PRN Kanchan Acuna MD   5 mg at 02/17/21 0034       Psychotherapy:   SUPPORTIVE    DSM V Diagnoses: Active Problems:    Depression with suicidal ideation    Major depressive disorder, recurrent severe without psychotic features ([facility] [address]  Resolved Problems:    * No resolved hospital problems.  *            Plan:    Encourage group therapy  15 minute safety checks  Medical monitoring by Dr. Radha De La Cruz and associates  Continue current therapy and medications   Social work to assist with [facility] placement     Amount of time spent with patient: 13 minutes with greater than 50% of the time spent in counseling and collaboration of care.     JOHN Tejada  Clinician Signature: signed electronically

## 2021-02-18 NOTE — PROGRESS NOTES
Jayme gave patient the number to call [facility] to call for screening to see if she would qualify for their services.

## 2021-02-19 PROCEDURE — 99233 SBSQ HOSP IP/OBS HIGH 50: CPT | Performed by: PSYCHIATRY & NEUROLOGY

## 2021-02-19 PROCEDURE — 1240000000 HC EMOTIONAL WELLNESS R&B

## 2021-02-19 PROCEDURE — 6370000000 HC RX 637 (ALT 250 FOR IP): Performed by: FAMILY MEDICINE

## 2021-02-19 PROCEDURE — 6370000000 HC RX 637 (ALT 250 FOR IP): Performed by: PSYCHIATRY & NEUROLOGY

## 2021-02-19 RX ADMIN — TRAZODONE HYDROCHLORIDE 50 MG: 50 TABLET ORAL at 20:10

## 2021-02-19 RX ADMIN — METOPROLOL TARTRATE 25 MG: 25 TABLET, FILM COATED ORAL at 20:09

## 2021-02-19 RX ADMIN — CYANOCOBALAMIN TAB 500 MCG 500 MCG: 500 TAB at 09:22

## 2021-02-19 RX ADMIN — Medication 5 MG: at 20:09

## 2021-02-19 RX ADMIN — MIRTAZAPINE 7.5 MG: 7.5 TABLET ORAL at 20:09

## 2021-02-19 RX ADMIN — PRAVASTATIN SODIUM 40 MG: 20 TABLET ORAL at 20:09
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
## 2021-02-19 NOTE — PROGRESS NOTES
Group Note    Number of Participants in Group: 2  Number of Patients on Unit:2      Patient attended group:Yes  Reason for Absence:  Intervention for patient absence:        Type of Group:   Wrap-Up/Relaxation    Patient's Goal: See wrap up group sheet    Participation Level:     Active Listener           Patient Response to Learning: Yes    Patient's Behavior: Cooperative    Is Patient Social/Interacting: Yes    Relaxation:   Television:No   Reading:No   Game/Puzzle:Yes   Phone: No       Notes/Comments:      Please see patient's wrap up group sheet for patient's comments       Electronically signed by Clay Delong RN on 2/18/21 at 9:40 PM CST

## 2021-02-19 NOTE — GROUP NOTE
Group Therapy Note    Date: 2/19/2021    Group Start Time: 0830  Group End Time: 0930  Group Topic: Community Meeting    [facility]AT[facility] UN[facility]    Jimy Bowman RN        Group Therapy Note    Attendees:          Patient's Goal:  \"staying calm and rest\"    Notes:  Pt calm and coopeartive during group    Status After Intervention:  Unchanged    Participation Level: Minimal    Participation Quality: Appropriate      Speech:  normal      Thought Process/Content: Logical      Affective Functioning: Congruent      Mood: depressed      Level of consciousness:  Alert      Response to Learning: Able to verbalize current knowledge/experience, Able to verbalize/acknowledge new learning, Able to retain information and Capable of insight      Endings: None Reported    Modes of Intervention: Education and Support      Discipline Responsible: Registered Nurse      Signature:  Sharla Plunkett RN

## 2021-02-19 NOTE — PROGRESS NOTES
I Daily Shift Assessment-Geriatric Unit  Nursing Progress Note          Room: [address] [place] [address]   Name: Margot Zamarripa   Age: 49 y.o. Gender: female   Dx: <principal problem not specified>  Precautions: suicide risk and fall risk  Inpatient Status: voluntary     SLEEP:    Sleep: Yes,   Sleep Quality Good   Hours Slept:    Sleep Medications: Yes  PRN Sleep Meds: No       MEDICAL:      Other PRN Meds: No   Med Compliant: Yes   Accu-Chek: No   Oxygen/CPAP/BiPAP: No  CIWA/CINA: No   PAIN Assessment: none  Side Effects from medication: No    Is Patient experiencing any respiratory symptoms (headache, fever, body aches, cough. Lesle Gulling ): no  Patient educated by nursing to practice social distancing, wear masks, wash hands frequently: yes      Metabolic Screening:    Lab Results   Component Value Date    LABA1C 5.5 04/22/2020       Lab Results   Component Value Date    CHOL 185 02/18/2021    CHOL 231 (H) 04/22/2020    CHOL 201 (H) 05/10/2019    CHOL 191 01/02/2018     Lab Results   Component Value Date    TRIG 122 02/18/2021    TRIG 120 04/22/2020    TRIG 132 05/10/2019    TRIG 141 01/02/2018     Lab Results   Component Value Date    HDL 42 (L) 02/18/2021    HDL 57 (L) 04/22/2020    HDL 38 (L) 05/10/2019    HDL 34 (L) 01/02/2018     No components found for: LDLCAL  No results found for: LABVLDL      Body mass index is 27.49 kg/m². BP Readings from Last 2 Encounters:   02/18/21 (!) 140/99   04/27/20 106/76         PSYCH:     SI Suicidal Ideation is off/ on, no plan   HI Negative for homicidal ideation        AVH:Absent      Depression: 2 Anxiety: 3-4      GENERAL:      Appetite: no change from normal  Social: Yes Speech: normal   Appearance:appropriately dressed  Assistive Devices: walkerLevel of Assist: Independent      GROUP:    Group Participation: Yes  Participation LevelActive Listener    Participation QualityAppropriate    Notes:     Patient has been cooperative and pleasant with staff.  She is social. She has enjoyed doing activities this evening. She rates her depression a 2 and her anxiety a 3-4. She reports SI on/ off, no HI or AVH. Patient walks with a walker. She has weakness on left side from a CVA. Denies pain this evening.         Electronically signed by Roni Harmon RN on 2/19/21 at 4:16 AM CST
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
## 2021-02-20 PROCEDURE — 1240000000 HC EMOTIONAL WELLNESS R&B

## 2021-02-20 PROCEDURE — 6370000000 HC RX 637 (ALT 250 FOR IP): Performed by: PSYCHIATRY & NEUROLOGY

## 2021-02-20 PROCEDURE — 6370000000 HC RX 637 (ALT 250 FOR IP): Performed by: FAMILY MEDICINE

## 2021-02-20 RX ADMIN — METOPROLOL TARTRATE 25 MG: 25 TABLET, FILM COATED ORAL at 08:42

## 2021-02-20 RX ADMIN — TRAZODONE HYDROCHLORIDE 50 MG: 50 TABLET ORAL at 20:41

## 2021-02-20 RX ADMIN — METOPROLOL TARTRATE 25 MG: 25 TABLET, FILM COATED ORAL at 20:41

## 2021-02-20 RX ADMIN — LISINOPRIL 20 MG: 20 TABLET ORAL at 08:42

## 2021-02-20 RX ADMIN — CYANOCOBALAMIN TAB 500 MCG 500 MCG: 500 TAB at 08:43

## 2021-02-20 RX ADMIN — PRAVASTATIN SODIUM 40 MG: 20 TABLET ORAL at 20:42

## 2021-02-20 RX ADMIN — Medication 5 MG: at 20:41

## 2021-02-20 RX ADMIN — MIRTAZAPINE 7.5 MG: 7.5 TABLET ORAL at 20:41

## 2021-02-20 NOTE — PROGRESS NOTES
WRAP UP GROUP NOTE    Patient's Goal:  Providing feedback as to their own progress in the care-plan provided. Patients have an opportunity to explore self-reflective skills and share any additional cares and concerns not yet addressed. Pt effectively participated. Energy Level:normal  Appetite:normal  Concentration:normal  Hallucinations:gone  Depression:improved  Anxiety:on/off  How I worked today:tried a little  What helps me sleep:try a relaxation exercise, \"just relax\"  Any questions/complaints/comments: \"no\"    Group/activities that helped me today were:  Community/Goals; \"Good/focus on me\"  Nursing Education; \"N/A\"  Process; \"Good\"  Life Skills;    Self-Enhancement; \"better\"  Therapeutic Recreation; \"good\"  Seeing Doctor(s); \"good/went well\"  One-to-One with Staff; Zoltan Chanel well\"  Journaling; \"N/A\"  Relaxation Training; \"good\"    Electronically signed by Sherri Pace RN on 2/19/2021 at 8:29 PM

## 2021-02-20 NOTE — PROGRESS NOTES
Spoke with intake from [facility] about pt having a bed there upon discharge. All questions answered and intake stated she was going to further send referral to their Psychologist and call us back on Monday.

## 2021-02-20 NOTE — PLAN OF CARE
Problem: Discharge Planning:  Goal: Discharged to appropriate level of care  Description: Discharged to appropriate level of care  2/19/2021 2049 by Randal De Jesus RN  Outcome: Ongoing  2/19/2021 1011 by Sheree Chan RN  Outcome: Ongoing     Problem: Health Maintenance - Impaired:  Goal: Ability to perform activities of daily living will improve  Description: Ability to perform activities of daily living will improve  2/19/2021 2049 by Randal De Jesus RN  Outcome: Ongoing  2/19/2021 1011 by Sheree Chan RN  Outcome: Ongoing  Goal: Able to sleep without medication for appropriate length of time  Description: Able to sleep without medication for appropriate length of time  2/19/2021 2049 by Randal De Jesus RN  Outcome: Ongoing  2/19/2021 1011 by Sheree Chan RN  Outcome: Ongoing  Goal: Maintenance of adequate nutrition will improve  Description: Maintenance of adequate nutrition will improve  2/19/2021 2049 by Randal De Jesus RN  Outcome: Ongoing  2/19/2021 1011 by Sheree Chan RN  Outcome: Ongoing     Problem: Mood - Altered:  Goal: Mood stable  Description: Mood stable  2/19/2021 2049 by Randal De Jesus RN  Outcome: Ongoing  2/19/2021 1011 by Sheree Chan RN  Outcome: Ongoing     Problem: Self-Esteem - Low:  Goal: Demonstrates positive self-esteem  Description: Demonstrates positive self-esteem  2/19/2021 2049 by Randal De Jesus RN  Outcome: Ongoing  2/19/2021 1011 by Sheree Chan RN  Outcome: Ongoing     Problem: Cerebrospinal Fluid Leakage - Risk Of:  Goal: Demonstration of organized thought processes  Description: Demonstration of organized thought processes  2/19/2021 2049 by Randal De Jesus RN  Outcome: Ongoing  2/19/2021 1011 by Sheree Chan RN  Outcome: Ongoing     Problem: Violence - Risk of, Self/Other-Directed:  Goal: Knowledge of developmental care interventions  Description: Absence of violence  2/19/2021 2049 by Randal De Jesus RN  Outcome: Ongoing  2/19/2021 1011 by Mis Casas RN  Outcome: Ongoing     Problem: Anxiety:  Goal: Level of anxiety will decrease  Description: Level of anxiety will decrease  2/19/2021 2049 by Yaya Cardenas RN  Outcome: Ongoing  2/19/2021 1011 by Mis Casas RN  Outcome: Ongoing     Problem: Falls - Risk of:  Goal: Will remain free from falls  Description: Will remain free from falls  2/19/2021 2049 by Yaya Cardenas RN  Outcome: Ongoing  2/19/2021 1011 by Mis Casas RN  Outcome: Ongoing  Goal: Absence of physical injury  Description: Absence of physical injury  2/19/2021 2049 by Yaya Cardenas RN  Outcome: Ongoing  2/19/2021 1011 by Mis Casas RN  Outcome: Ongoing     Problem: Pain:  Goal: Pain level will decrease  Description: Pain level will decrease  2/19/2021 2049 by Yaya Cardenas RN  Outcome: Ongoing  2/19/2021 1011 by Mis Casas RN  Outcome: Ongoing  Goal: Control of acute pain  Description: Control of acute pain  2/19/2021 2049 by Yaya Cardenas RN  Outcome: Ongoing  2/19/2021 1011 by Mis Casas RN  Outcome: Ongoing  Goal: Control of chronic pain  Description: Control of chronic pain  2/19/2021 2049 by Yaya Cardenas RN  Outcome: Ongoing  2/19/2021 1011 by Mis Casas RN  Outcome: Ongoing

## 2021-02-20 NOTE — PLAN OF CARE
Problem: Discharge Planning:  Goal: Discharged to appropriate level of care  Description: Discharged to appropriate level of care  2/20/2021 0947 by Michael Hernandez RN  Outcome: Ongoing  2/19/2021 2049 by Sascha Caputo RN  Outcome: Ongoing     Problem: Health Maintenance - Impaired:  Goal: Ability to perform activities of daily living will improve  Description: Ability to perform activities of daily living will improve  2/20/2021 0947 by Michael Hernandez RN  Outcome: Ongoing  2/19/2021 2049 by Sascha Caputo RN  Outcome: Ongoing  Goal: Able to sleep without medication for appropriate length of time  Description: Able to sleep without medication for appropriate length of time  2/20/2021 0947 by Michael Hernandez RN  Outcome: Ongoing  2/19/2021 2049 by Sascha Caputo RN  Outcome: Ongoing  Goal: Maintenance of adequate nutrition will improve  Description: Maintenance of adequate nutrition will improve  2/20/2021 0947 by Michael Hernandez RN  Outcome: Ongoing  2/19/2021 2049 by Sascha Caputo RN  Outcome: Ongoing     Problem: Mood - Altered:  Goal: Mood stable  Description: Mood stable  2/20/2021 0947 by Michael Hernandez RN  Outcome: Ongoing  2/19/2021 2049 by Sascha Caputo RN  Outcome: Ongoing     Problem: Self-Esteem - Low:  Goal: Demonstrates positive self-esteem  Description: Demonstrates positive self-esteem  2/20/2021 0947 by Michael Hernandez RN  Outcome: Ongoing  2/19/2021 2049 by Sascha Caputo RN  Outcome: Ongoing     Problem: Cerebrospinal Fluid Leakage - Risk Of:  Goal: Demonstration of organized thought processes  Description: Demonstration of organized thought processes  2/20/2021 0947 by Michael Hernandez RN  Outcome: Ongoing  2/19/2021 2049 by Sascha Caputo RN  Outcome: Ongoing     Problem: Violence - Risk of, Self/Other-Directed:  Goal: Knowledge of developmental care interventions  Description: Absence of violence  2/20/2021 0947 by Michael Hernandez RN  Outcome: Ongoing  2/19/2021 2049 by Lesli Darnell RN  Outcome: Ongoing     Problem: Anxiety:  Goal: Level of anxiety will decrease  Description: Level of anxiety will decrease  2/20/2021 0947 by Brisa Castillo RN  Outcome: Ongoing  2/19/2021 2049 by Lesli Darnell RN  Outcome: Ongoing     Problem: Falls - Risk of:  Goal: Will remain free from falls  Description: Will remain free from falls  2/20/2021 0947 by Brisa Castillo RN  Outcome: Ongoing  2/19/2021 2049 by Lesli Darnell RN  Outcome: Ongoing  Goal: Absence of physical injury  Description: Absence of physical injury  2/20/2021 0947 by Brisa Castillo RN  Outcome: Ongoing  2/19/2021 2049 by Lesli Darnell RN  Outcome: Ongoing     Problem: Pain:  Goal: Pain level will decrease  Description: Pain level will decrease  2/20/2021 0947 by Brisa Castillo RN  Outcome: Ongoing  2/19/2021 2049 by Lesli Darnell RN  Outcome: Ongoing  Goal: Control of acute pain  Description: Control of acute pain  2/20/2021 0947 by Brisa Castillo RN  Outcome: Ongoing  2/19/2021 2049 by Lesli Darnell RN  Outcome: Ongoing  Goal: Control of chronic pain  Description: Control of chronic pain  2/20/2021 0947 by Brisa Castillo RN  Outcome: Ongoing  2/19/2021 2049 by Lesli Darnell RN  Outcome: Ongoing

## 2021-02-20 NOTE — PROGRESS NOTES
[facility] Daily Shift Assessment  Nursing Progress Note    Room: 0620/620-01 Name: Edin Camacho Age: 49 y.o. Gender: female   Dx: <principal problem not specified>  Precautions: suicide risk and fall risk  Target Symptoms:   Accu-Chek: NoSleep: Yes,Sleep Quality Good SI No AVH denies [facility]  ADLs: Yes Speech: normal Depression: \"better\" Anxiety: \"better\"   Participation LevelActive Listener  Appetite: Good  Respiratory symptoms: No Headache: No Body aches: No Fever: No Cough: No  Patients encouraged to wear masks, wash hands frequently and practice social distancing while on the unit: Yes  Visitation: No   Participation QualityAppropriate, Attentive, Sharing and Supportive    Notes: Patient sitting in TV area dressed and groomed during interview. Patient states she slept good last night and is feeling better today. Pt states \"I can tell I am back on my meds because I am feeling better already\". Pt bright this shift up in day area coloring and laughing during conversation. Pt denies SI,HI and AVH this shift. Pt is compliant with treatment plan and has no complaints. Will continue to monitor pt this shift.

## 2021-02-20 NOTE — PROGRESS NOTES
Progress Note  Tehama Numbers  2/19/2021 8:26 PM  Subjective:   Admit Date:   2/16/2021      CC/ADMIT DX:       Interval History:   Reviewed overnight events and nursing notes. She has no new medical issues. I have reviewed all labs/diagnostics from the last 24hrs. ROS:   I have done a 10 point ROS and all are negative, except what is mentioned in the HPI. DIET LOW FAT; Medications:      vitamin B-12  500 mcg Oral Daily    nicotine  1 patch Transdermal Daily    vitamin D  50,000 Units Oral Weekly    mirtazapine  7.5 mg Oral Nightly    pravastatin  40 mg Oral Nightly    melatonin  5 mg Oral Nightly    metoprolol tartrate  25 mg Oral BID    lisinopril  20 mg Oral Daily    traZODone  50 mg Oral Nightly           Objective:   Vitals: /82   Pulse 89   Temp 98 °F (36.7 °C) (Temporal)   Resp 18   Ht 5' 4\" (1.626 m)   Wt 160 lb 2 oz (72.6 kg)   LMP  (LMP Unknown)   SpO2 95%   BMI 27.49 kg/m²  No intake or output data in the 24 hours ending 02/19/21 2026  General appearance: alert and cooperative with exam  Extremities: extremities normal, atraumatic, no cyanosis or edema  Neurologic:  No obvious focal neurologic deficits. Skin: no rashes    Assessment and Plan: Active Problems:    Suicidal ideation    Depression with suicidal ideation    Major depressive disorder, recurrent severe without psychotic features ([facility] [address]  Resolved Problems:    * No resolved hospital problems. *    HTN    Vit D Def    Plan:  1. Continue present medication(s)   2. Continue to follow with BP  3. Follow with Psych      Discharge planning:   her home     Reviewed treatment plans with the patient and/or family.              Electronically signed by Ghazala Zendejas MD on 2/19/2021 at 8:26 PM

## 2021-02-20 NOTE — PROGRESS NOTES
Group Therapy Note    Date: 02/19/2021  Start Time: 7882  End Time:  1630    Number of Participants: 2    Type of Group:  Healthy Living/Wellness    Patient's Goal:  \"staying calm\"    Notes:  Pt calm and cooperative during group    Status After Intervention:  Improved    Participation Level:  Active Listener    Participation Quality: Appropriate    Speech:  normal    Thought Process/Content: Logical    Affective Functioning: Congruent    Mood: bright    Level of consciousness:  Alert    Response to Learning: Able to verbalize current knowledge/experience, Able to verbalize/acknowledge new learning, Able to retain information and Capable of insight    Modes of Intervention: Education and Support    Discipline Responsible: Registered Nurse    Signature: Ayesha Bullock RN

## 2021-02-20 NOTE — GROUP NOTE
Group Therapy Note    Date: 2/20/2021    Group Start Time: 0830  Group End Time: 0930  Group Topic: Community Meeting    [facility] 6 GERIATRIC BEHAVIORAL UNIT    Colton Trujillo RN        Group Therapy Note    Attendees:          Patient's Goal:  \"being happy\"    Notes:  Pt calm and cooperative during group    Status After Intervention:  Improved    Participation Level:  Active Listener    Participation Quality: Appropriate      Speech:  normal      Thought Process/Content: Logical      Affective Functioning: Congruent      Mood: bright      Level of consciousness:  Alert      Response to Learning: Able to verbalize current knowledge/experience, Able to verbalize/acknowledge new learning, Able to retain information, Capable of insight and Able to change behavior      Endings: None Reported    Modes of Intervention: Education and Support      Discipline Responsible: Registered Nurse      Signature:  eKlley Lindquist RN

## 2021-02-20 NOTE — PROGRESS NOTES
[facility] Daily Shift Assessment-Geriatric Unit  Nursing Progress Note          Room: [address] [place] [address]   Name: Cherelle Deshpande   Age: 49 y.o. Gender: female   Dx: <principal problem not specified>  Precautions: suicide risk and fall risk  Inpatient Status: voluntary     SLEEP:    Sleep: Yes,   Sleep Quality Good   Hours Slept:    Sleep Medications: Yes:Melatonin 5,Remeron 7.5mg, Trazadone 50mg  PRN Sleep Meds: No       MEDICAL:      Other PRN Meds: No   Med Compliant: Yes   Accu-Chek: No   Oxygen/CPAP/BiPAP: No  CIWA/CINA: No   PAIN Assessment: denies  Side Effects from medication: none voiced    Is Patient experiencing any respiratory symptoms (headache, fever, body aches, cough. Zafar Garrett ): no  Patient educated by nursing to practice social distancing, wear masks, wash hands frequently: no      Metabolic Screening:    Lab Results   Component Value Date    LABA1C 5.5 04/22/2020       Lab Results   Component Value Date    CHOL 185 02/18/2021    CHOL 231 (H) 04/22/2020    CHOL 201 (H) 05/10/2019    CHOL 191 01/02/2018     Lab Results   Component Value Date    TRIG 122 02/18/2021    TRIG 120 04/22/2020    TRIG 132 05/10/2019    TRIG 141 01/02/2018     Lab Results   Component Value Date    HDL 42 (L) 02/18/2021    HDL 57 (L) 04/22/2020    HDL 38 (L) 05/10/2019    HDL 34 (L) 01/02/2018     No components found for: LDLCAL  No results found for: LABVLDL      Body mass index is 27.49 kg/m². BP Readings from Last 2 Encounters:   02/19/21 123/82   04/27/20 106/76         PSYCH:     SI passive    HI Negative for homicidal ideation        AVH:denies      Depression: 3 Anxiety: 2       GENERAL:      Appetite: good  Social: No Speech: normal   Appearance:appropriately dressed  Assistive Devices: walkerLevel of Assist: Minimal Assist      GROUP:    Group Participation: Yes  Participation LevelMinimal    Participation QualityAppropriate    Notes: Pt has rested well tonight. Pt has had no complaints over this shift.  Pt in her room lying in bed at shift change and reported \"feeling sleepy. \" Pt reported good sleep the night before, but did have a few \"bad dreams about my daughters. \" Pt was med compliant, but not social. Pt did come out of her room long enough tonight to complete her wrap-up group. Pt reports improved anxiety and depression rating depression a 3 and anxiety a 2. Pt still reporting thoughts of passive SI but denies HI and AVH. No noted s/s of COVID. Education continues on infection prevention. Monitoring for safety continues as ordered.     Electronically signed by Sae Solis RN on 2/20/2021 at 5:42 AM

## 2021-02-21 PROCEDURE — 6370000000 HC RX 637 (ALT 250 FOR IP): Performed by: PSYCHIATRY & NEUROLOGY

## 2021-02-21 PROCEDURE — 6370000000 HC RX 637 (ALT 250 FOR IP): Performed by: FAMILY MEDICINE

## 2021-02-21 PROCEDURE — 1240000000 HC EMOTIONAL WELLNESS R&B

## 2021-02-21 PROCEDURE — 99232 SBSQ HOSP IP/OBS MODERATE 35: CPT | Performed by: PSYCHIATRY & NEUROLOGY

## 2021-02-21 RX ADMIN — Medication 5 MG: at 21:31

## 2021-02-21 RX ADMIN — PRAVASTATIN SODIUM 40 MG: 20 TABLET ORAL at 21:31

## 2021-02-21 RX ADMIN — LISINOPRIL 20 MG: 20 TABLET ORAL at 09:11

## 2021-02-21 RX ADMIN — CYANOCOBALAMIN TAB 500 MCG 500 MCG: 500 TAB at 09:11

## 2021-02-21 RX ADMIN — METOPROLOL TARTRATE 25 MG: 25 TABLET, FILM COATED ORAL at 21:31

## 2021-02-21 RX ADMIN — TRAZODONE HYDROCHLORIDE 50 MG: 50 TABLET ORAL at 21:32

## 2021-02-21 RX ADMIN — MIRTAZAPINE 7.5 MG: 7.5 TABLET ORAL at 21:31

## 2021-02-21 ASSESSMENT — PAIN SCALES - GENERAL: PAINLEVEL_OUTOF10: 0

## 2021-02-21 NOTE — GROUP NOTE
Group Therapy Note    Date: 2/21/2021    Group Start Time: 0830  Group End Time: 0930  Group Topic: Community Meeting    [facility] 6 GERIATRIC BEHAVIORAL UNIT    Amber Francis RN        Group Therapy Note    Attendees:          Patient's Goal:  \"work on myself\"    Notes:  Pt calm and cooperative during group    Status After Intervention:  Improved    Participation Level:  Active Listener    Participation Quality: Appropriate      Speech:  normal      Thought Process/Content: Logical      Affective Functioning: Congruent      Mood: depressed      Level of consciousness:  Alert      Response to Learning: Able to verbalize current knowledge/experience, Able to verbalize/acknowledge new learning and Able to retain information      Endings: None Reported    Modes of Intervention: Education and Support      Discipline Responsible: Registered Nurse      Signature:  Emile Dumont RN

## 2021-02-21 NOTE — PROGRESS NOTES
[facility] Adult Unit Daily Assessment  Nursing Progress Note    Room: [facility]/620-01   Name: Sandra Castellanos   Age: 49 y.o. Gender: female   Dx: <principal problem not specified>  Precautions: suicide risk and fall risk  Inpatient Status: voluntary       SLEEP:    Sleep Quality Good  Sleep Medications: Yes melatonin 5mg trazadone 50mg  PRN Sleep Meds: No       MEDICAL:    Other PRN Meds: No   Med Compliant: Yes  Accu-Chek: No  Oxygen/CPAP/BiPAP: No  CIWA/CINA: No   PAIN Assessment: none  Side Effects from medication: No    Is Patient experiencing any respiratory symptoms (headache, fever, body aches, cough. Nilsa Nieto ): no  Patient educated by nursing to practice social distancing, wear masks, wash hands frequently: yes      PSYCH:    Depression: 0   Anxiety: 0   SI denies suicidal ideation   HI Negative for homicidal ideation      AVH:Absent      GENERAL:    Appetite: improved    Social: Yes   Speech: normal   Appearance: appropriately dressed, appropriately groomed, good hygiene and healthy looking    GROUP:    Group Participation: Yes  Participation Quality: Interactive    Notes: Pt is in her room during this interview she is pleasant and cooperative. Pt has a positive outlook and is eager to go to [facility],talked about her past experience with the [facility]. Will continue to monitor.

## 2021-02-21 NOTE — PROGRESS NOTES
[facility] Daily Shift Assessment  Nursing Progress Note    Room: 0620/620-01 Name: Augustin Moritz Age: 49 y.o. Gender: female   Dx: <principal problem not specified>  Precautions: suicide risk and fall risk  Target Symptoms:   Accu-Chek: NoSleep: Yes,Sleep Quality Good SI No AVH denies [facility]  ADLs: Yes Speech: normal Depression: 5 Anxiety: 5   Participation LevelActive Listener  Appetite: Good  Respiratory symptoms: No Headache: No Body aches: No Fever: No Cough: No  Patients encouraged to wear masks, wash hands frequently and practice social distancing while on the unit: Yes  Visitation: No   Participation QualityAppropriate    Notes: Pt sitting in dining area and dressed during interview. Pt calm and cooperative this shift and is compliant with treatment plan. Pt states \"today is a good day, and I am going to keep working on me today\". Pt states she is excited about going to the [facility] and looking forward to getting back on her feet. Pt denies SI,HI and AVH and has no complaints this shift. Will continue to monitor pt safety this shift.

## 2021-02-21 NOTE — PROGRESS NOTES
Group Note    Number of Participants in Group: 2  Number of Patients on Unit:4      Patient attended group:Yes  Reason for Absence:  Intervention for patient absence:        Type of Group:   Wrap-Up/Relaxation    Patient's Goal: See wrap up group sheet    Participation Level:    interactive         Patient Response to Learning: Yes    Patient's Behavior: Cooperative    Is Patient Social/Interacting: Yes    Relaxation:   Television:Yes   Reading:No   Game/Puzzle:Yes   Phone: Yes       Notes/Comments:Pt I pleasant ,properly groomed. Pt does state she is tired from working out on the exercise equipment. Denies any problems.     Please see patient's wrap up group sheet for patient's comments

## 2021-02-21 NOTE — PLAN OF CARE
Problem: Anxiety:  Goal: Level of anxiety will decrease  Description: Level of anxiety will decrease  2/20/2021 2228 by Ophelia Cadena RN  Outcome: Met This Shift  2/20/2021 0947 by uJlio Calzada RN  Outcome: Ongoing     Problem: Falls - Risk of:  Goal: Will remain free from falls  Description: Will remain free from falls  2/20/2021 2228 by Ophelia Cadena RN  Outcome: Met This Shift  2/20/2021 0947 by Julio Calzada RN  Outcome: Ongoing  Goal: Absence of physical injury  Description: Absence of physical injury  2/20/2021 2228 by Ophelia Cadena RN  Outcome: Met This Shift  2/20/2021 0947 by Julio Calzada RN  Outcome: Ongoing     Problem: Discharge Planning:  Goal: Discharged to appropriate level of care  Description: Discharged to appropriate level of care  2/20/2021 2228 by Ophelia Cadena RN  Outcome: Ongoing  2/20/2021 0947 by Julio Calzada RN  Outcome: Ongoing     Problem: Health Maintenance - Impaired:  Goal: Ability to perform activities of daily living will improve  Description: Ability to perform activities of daily living will improve  2/20/2021 2228 by Ophelia Cadena RN  Outcome: Ongoing  2/20/2021 0947 by Julio Calzada RN  Outcome: Ongoing  Goal: Able to sleep without medication for appropriate length of time  Description: Able to sleep without medication for appropriate length of time  2/20/2021 2228 by Ophelia Cadena RN  Outcome: Ongoing  2/20/2021 0947 by Julio Calzada RN  Outcome: Ongoing  Goal: Maintenance of adequate nutrition will improve  Description: Maintenance of adequate nutrition will improve  2/20/2021 2228 by Ophelia Cadena RN  Outcome: Ongoing  2/20/2021 0947 by Julio Calzada RN  Outcome: Ongoing     Problem: Mood - Altered:  Goal: Mood stable  Description: Mood stable  2/20/2021 2228 by Ophelia Cadena RN  Outcome: Ongoing  2/20/2021 0947 by Julio Calzada RN  Outcome: Ongoing     Problem: Self-Esteem - Low:  Goal: Demonstrates positive self-esteem  Description: Demonstrates positive self-esteem  2/20/2021 2228 by Sailaja Roberts RN  Outcome: Ongoing  2/20/2021 0947 by Mis Casas RN  Outcome: Ongoing     Problem: Cerebrospinal Fluid Leakage - Risk Of:  Goal: Demonstration of organized thought processes  Description: Demonstration of organized thought processes  2/20/2021 2228 by Sailaja Roberts RN  Outcome: Ongoing  2/20/2021 0947 by Mis Casas RN  Outcome: Ongoing     Problem: Violence - Risk of, Self/Other-Directed:  Goal: Knowledge of developmental care interventions  Description: Absence of violence  2/20/2021 2228 by Sailaja Roberts RN  Outcome: Ongoing  2/20/2021 0947 by Mis Casas RN  Outcome: Ongoing     Problem: Pain:  Goal: Pain level will decrease  Description: Pain level will decrease  2/20/2021 2228 by Sailaja Roberts RN  Outcome: Ongoing  2/20/2021 0947 by Mis Casas RN  Outcome: Ongoing  Goal: Control of acute pain  Description: Control of acute pain  2/20/2021 2228 by Sailaja Roberts RN  Outcome: Ongoing  2/20/2021 0947 by Mis Casas RN  Outcome: Ongoing  Goal: Control of chronic pain  Description: Control of chronic pain  2/20/2021 2228 by Sailaja Roberts RN  Outcome: Ongoing  2/20/2021 0947 by Mis Casas RN  Outcome: Ongoing

## 2021-02-21 NOTE — PROGRESS NOTES
02/16/2021      Lab Results   Component Value Date     02/16/2021    K 3.7 02/16/2021     02/16/2021    CO2 26 02/16/2021    BUN 11 02/16/2021    CREATININE 0.7 02/16/2021    GLUCOSE 96 02/16/2021    CALCIUM 9.5 02/16/2021    PROT 7.9 02/16/2021    LABALBU 4.7 02/16/2021    BILITOT 0.5 02/16/2021    ALKPHOS 86 02/16/2021    AST 16 02/16/2021    ALT 13 02/16/2021    LABGLOM >60 02/16/2021    GFRAA >59 02/16/2021    GLOB 3.0 11/12/2016       Medications    Current Facility-Administered Medications:     vitamin B-12 (CYANOCOBALAMIN) tablet 500 mcg, 500 mcg, Oral, Daily, Philippe Guardado MD, 500 mcg at 02/21/21 0911    acetaminophen (TYLENOL) tablet 650 mg, 650 mg, Oral, Q4H PRN, Harris Salinas MD    polyethylene glycol (GLYCOLAX) packet 17 g, 17 g, Oral, Daily PRN, Harris Salinas MD    nicotine (NICODERM CQ) 21 MG/24HR 1 patch, 1 patch, Transdermal, Daily, Harris Salinas MD    cloNIDine (CATAPRES) tablet 0.1 mg, 0.1 mg, Oral, Q6H PRN, Philippe Guardado MD    vitamin D (ERGOCALCIFEROL) capsule 50,000 Units, 50,000 Units, Oral, Weekly, Kiara Montelongo MD, 50,000 Units at 02/17/21 1146    mirtazapine (REMERON) tablet 7.5 mg, 7.5 mg, Oral, Nightly, Kiara Montelongo MD, 7.5 mg at 02/20/21 2041    pravastatin (PRAVACHOL) tablet 40 mg, 40 mg, Oral, Nightly, Kiara Montelongo MD, 40 mg at 02/20/21 2042    melatonin disintegrating tablet 5 mg, 5 mg, Oral, Nightly, Kiara Montelongo MD, 5 mg at 02/20/21 2041    metoprolol tartrate (LOPRESSOR) tablet 25 mg, 25 mg, Oral, BID, Philippe Guardado MD, Stopped at 02/21/21 0911    lisinopril (PRINIVIL;ZESTRIL) tablet 20 mg, 20 mg, Oral, Daily, Philippe Guardado MD, 20 mg at 02/21/21 0911    traZODone (DESYREL) tablet 50 mg, 50 mg, Oral, Nightly, Harris Salinas MD, 50 mg at 02/20/21 2041    melatonin disintegrating tablet 5 mg, 5 mg, Oral, Nightly PRN, Harris Salinas MD, 5 mg at 02/17/21 0034    ASSESSMENT AND PLAN  DSM 5 DIAGNOSIS  Impression  Major

## 2021-02-21 NOTE — PROGRESS NOTES
Progress Note  Sandra Castellanos  2/20/2021 10:15 PM  Subjective:   Admit Date:   2/16/2021      CC/ADMIT DX:       Interval History:   Reviewed overnight events and nursing notes. She has no physical complaints. I have reviewed all labs/diagnostics from the last 24hrs. ROS:   I have done a 10 point ROS and all are negative, except what is mentioned in the HPI. DIET LOW FAT; Medications:      vitamin B-12  500 mcg Oral Daily    nicotine  1 patch Transdermal Daily    vitamin D  50,000 Units Oral Weekly    mirtazapine  7.5 mg Oral Nightly    pravastatin  40 mg Oral Nightly    melatonin  5 mg Oral Nightly    metoprolol tartrate  25 mg Oral BID    lisinopril  20 mg Oral Daily    traZODone  50 mg Oral Nightly           Objective:   Vitals: BP 98/66   Pulse 85   Temp 97.5 °F (36.4 °C) (Temporal)   Resp 18   Ht 5' 4\" (1.626 m)   Wt 160 lb 2 oz (72.6 kg)   LMP  (LMP Unknown)   SpO2 93%   BMI 27.49 kg/m²  No intake or output data in the 24 hours ending 02/20/21 2215  General appearance: alert and cooperative with exam  Extremities: extremities normal, atraumatic, no cyanosis or edema  Neurologic:  No obvious focal neurologic deficits. Skin: no rashes    Assessment and Plan: Active Problems:    Suicidal ideation    Depression with suicidal ideation    Major depressive disorder, recurrent severe without psychotic features ([facility] [address]  Resolved Problems:    * No resolved hospital problems. *    HTN    Vit D Def    Plan:  1. Continue present medication(s)   2. She is medically stable. I will monitor for any changes or concerns. 3.  Follow with Psych      Discharge planning:   her home     Reviewed treatment plans with the patient and/or family.              Electronically signed by Andre Molina MD on 2/20/2021 at 10:15 PM

## 2021-02-21 NOTE — PLAN OF CARE
Problem: Discharge Planning:  Goal: Discharged to appropriate level of care  Description: Discharged to appropriate level of care  2/21/2021 0855 by Sheree Chan RN  Outcome: Ongoing  2/20/2021 2228 by Teja Hale RN  Outcome: Ongoing     Problem: Health Maintenance - Impaired:  Goal: Ability to perform activities of daily living will improve  Description: Ability to perform activities of daily living will improve  2/21/2021 0855 by Sheree Chan RN  Outcome: Ongoing  2/20/2021 2228 by Teja Hale RN  Outcome: Ongoing  Goal: Able to sleep without medication for appropriate length of time  Description: Able to sleep without medication for appropriate length of time  2/21/2021 0855 by Sheree Chan RN  Outcome: Ongoing  2/20/2021 2228 by Teja Hale RN  Outcome: Ongoing  Goal: Maintenance of adequate nutrition will improve  Description: Maintenance of adequate nutrition will improve  2/21/2021 0855 by Sheree Chan RN  Outcome: Ongoing  2/20/2021 2228 by Teja Hale RN  Outcome: Ongoing     Problem: Mood - Altered:  Goal: Mood stable  Description: Mood stable  2/21/2021 0855 by Sheree Chan RN  Outcome: Ongoing  2/20/2021 2228 by Teja Hale RN  Outcome: Ongoing     Problem: Self-Esteem - Low:  Goal: Demonstrates positive self-esteem  Description: Demonstrates positive self-esteem  2/21/2021 0855 by Sheree Chan RN  Outcome: Ongoing  2/20/2021 2228 by Teja Hale RN  Outcome: Ongoing     Problem: Cerebrospinal Fluid Leakage - Risk Of:  Goal: Demonstration of organized thought processes  Description: Demonstration of organized thought processes  2/21/2021 0855 by Sheree Chan RN  Outcome: Ongoing  2/20/2021 2228 by Teja Hale RN  Outcome: Ongoing     Problem: Violence - Risk of, Self/Other-Directed:  Goal: Knowledge of developmental care interventions  Description: Absence of violence  2/21/2021 0855 by Sheree Chan RN  Outcome: Ongoing  2/20/2021 2228 by Monica Pearson Abiel Theodore RN  Outcome: Ongoing     Problem: Anxiety:  Goal: Level of anxiety will decrease  Description: Level of anxiety will decrease  2/21/2021 0855 by Marcel Mckay RN  Outcome: Ongoing  2/20/2021 2228 by Rudolph Clay RN  Outcome: Met This Shift     Problem: Falls - Risk of:  Goal: Will remain free from falls  Description: Will remain free from falls  2/21/2021 0855 by Marcel Mckay RN  Outcome: Ongoing  2/20/2021 2228 by Rudolph Clay RN  Outcome: Met This Shift  Goal: Absence of physical injury  Description: Absence of physical injury  2/21/2021 0855 by Marcel Mckay RN  Outcome: Ongoing  2/20/2021 2228 by Rudolph Clay RN  Outcome: Met This Shift     Problem: Pain:  Goal: Pain level will decrease  Description: Pain level will decrease  2/21/2021 0855 by Marcel Mckay RN  Outcome: Ongoing  2/20/2021 2228 by Rudolph Clay RN  Outcome: Ongoing  Goal: Control of acute pain  Description: Control of acute pain  2/21/2021 0855 by Marcel Mckay RN  Outcome: Ongoing  2/20/2021 2228 by Rudolph Clay RN  Outcome: Ongoing  Goal: Control of chronic pain  Description: Control of chronic pain  2/21/2021 0855 by Marcel Mckay RN  Outcome: Ongoing  2/20/2021 2228 by Rudolph Clay RN  Outcome: Ongoing

## 2021-02-22 PROCEDURE — 6370000000 HC RX 637 (ALT 250 FOR IP): Performed by: FAMILY MEDICINE

## 2021-02-22 PROCEDURE — 99232 SBSQ HOSP IP/OBS MODERATE 35: CPT | Performed by: PSYCHIATRY & NEUROLOGY

## 2021-02-22 PROCEDURE — 6370000000 HC RX 637 (ALT 250 FOR IP): Performed by: PSYCHIATRY & NEUROLOGY

## 2021-02-22 PROCEDURE — 1240000000 HC EMOTIONAL WELLNESS R&B

## 2021-02-22 RX ADMIN — CYANOCOBALAMIN TAB 500 MCG 500 MCG: 500 TAB at 08:30

## 2021-02-22 RX ADMIN — PRAVASTATIN SODIUM 40 MG: 20 TABLET ORAL at 20:30

## 2021-02-22 RX ADMIN — LISINOPRIL 20 MG: 20 TABLET ORAL at 08:30

## 2021-02-22 RX ADMIN — TRAZODONE HYDROCHLORIDE 50 MG: 50 TABLET ORAL at 20:30

## 2021-02-22 RX ADMIN — METOPROLOL TARTRATE 25 MG: 25 TABLET, FILM COATED ORAL at 20:30

## 2021-02-22 RX ADMIN — Medication 5 MG: at 20:30

## 2021-02-22 RX ADMIN — MIRTAZAPINE 7.5 MG: 7.5 TABLET ORAL at 20:30

## 2021-02-22 RX ADMIN — METOPROLOL TARTRATE 25 MG: 25 TABLET, FILM COATED ORAL at 08:30

## 2021-02-22 NOTE — PLAN OF CARE
Problem: Health Maintenance - Impaired:  Goal: Ability to perform activities of daily living will improve  Description: Ability to perform activities of daily living will improve  Outcome: Met This Shift  Goal: Maintenance of adequate nutrition will improve  Description: Maintenance of adequate nutrition will improve  Outcome: Met This Shift     Problem: Cerebrospinal Fluid Leakage - Risk Of:  Goal: Demonstration of organized thought processes  Description: Demonstration of organized thought processes  Outcome: Met This Shift     Problem: Falls - Risk of:  Goal: Will remain free from falls  Description: Will remain free from falls  Outcome: Met This Shift  Goal: Absence of physical injury  Description: Absence of physical injury  Outcome: Met This Shift     Problem: Pain:  Goal: Pain level will decrease  Description: Pain level will decrease  Outcome: Met This Shift  Goal: Control of acute pain  Description: Control of acute pain  Outcome: Met This Shift  Goal: Control of chronic pain  Description: Control of chronic pain  Outcome: Met This Shift     Problem: Discharge Planning:  Goal: Discharged to appropriate level of care  Description: Discharged to appropriate level of care  Outcome: Ongoing     Problem: Health Maintenance - Impaired:  Goal: Able to sleep without medication for appropriate length of time  Description: Able to sleep without medication for appropriate length of time  Outcome: Ongoing     Problem: Mood - Altered:  Goal: Mood stable  Description: Mood stable  Outcome: Ongoing     Problem: Self-Esteem - Low:  Goal: Demonstrates positive self-esteem  Description: Demonstrates positive self-esteem  Outcome: Ongoing     Problem: Violence - Risk of, Self/Other-Directed:  Goal: Knowledge of developmental care interventions  Description: Absence of violence  Outcome: Ongoing     Problem: Anxiety:  Goal: Level of anxiety will decrease  Description: Level of anxiety will decrease  Outcome: Ongoing

## 2021-02-22 NOTE — PROGRESS NOTES
Department of Psychiatry  Attending Progress Note     Chief complaint: \"I'm fine\"    SUBJECTIVE:   Chart reviewed, discussed with the team.  No issues overnight. No SI. Brighter and more future-oriented. No issues with sleep or appetite. Patient seen in the dining area this morning. She is smiling. \"Doing fine. \" Denies suicidal thoughts. Hoping to go to the [address] OBJECTIVE    Physical  Wt Readings from Last 3 Encounters:   02/18/21 160 lb 2 oz (72.6 kg)   04/21/20 145 lb 6.4 oz (66 kg)   06/28/19 155 lb (70.3 kg)     Temp Readings from Last 3 Encounters:   02/22/21 97.7 °F (36.5 °C) (Temporal)   04/27/20 97.1 °F (36.2 °C) (Temporal)   07/05/19 98.9 °F (37.2 °C) (Temporal)     BP Readings from Last 3 Encounters:   02/22/21 132/77   04/27/20 106/76   07/05/19 121/69     Pulse Readings from Last 3 Encounters:   02/22/21 92   04/27/20 101   07/05/19 119        Review of Systems: 14-point review of systems negative except as described above    Mental Status Examination:   Appearance: Stated age, casually dressed. Gait not assessed. No abnormal movements or tremor. Behavior: Calm, cooperative. Speech: Normal in tone, volume, and quality. No slurring, dysarthria or pressured speech noted. Mood: \"Better \"   Affect: Mood congruent. Thought Process: Appears linear. Thought Content: Denies suicidal and homicidal ideation. No overt delusions or paranoia appreciated. Perceptions: Denies auditory or visual hallucinations at present time. Not responding to internal stimuli. Concentration: Intact. Orientation: to person, place, date, and situation. Language: Intact. Fund of information: Intact. Memory: Recent and remote appear intact. Neurovegitative: Fair appetite and sleep. Insight: Improved. Judgment: Improved.     Data  Lab Results   Component Value Date    WBC 7.0 02/16/2021    HGB 14.2 02/16/2021    HCT 43.3 02/16/2021    MCV 98.6 02/16/2021     02/16/2021      Lab Results   Component Value Date     02/16/2021    K 3.7 02/16/2021     02/16/2021    CO2 26 02/16/2021    BUN 11 02/16/2021    CREATININE 0.7 02/16/2021    GLUCOSE 96 02/16/2021    CALCIUM 9.5 02/16/2021    PROT 7.9 02/16/2021    LABALBU 4.7 02/16/2021    BILITOT 0.5 02/16/2021    ALKPHOS 86 02/16/2021    AST 16 02/16/2021    ALT 13 02/16/2021    LABGLOM >60 02/16/2021    GFRAA >59 02/16/2021    GLOB 3.0 11/12/2016       Medications    Current Facility-Administered Medications:     vitamin B-12 (CYANOCOBALAMIN) tablet 500 mcg, 500 mcg, Oral, Daily, Anusha Sloan MD, 500 mcg at 02/22/21 0830    acetaminophen (TYLENOL) tablet 650 mg, 650 mg, Oral, Q4H PRN, Nataliia Melendrez MD    polyethylene glycol (GLYCOLAX) packet 17 g, 17 g, Oral, Daily PRN, Nataliia Melendrez MD    nicotine (NICODERM CQ) 21 MG/24HR 1 patch, 1 patch, Transdermal, Daily, Nataliia Melendrez MD    cloNIDine (CATAPRES) tablet 0.1 mg, 0.1 mg, Oral, Q6H PRN, Anusha Sloan MD    vitamin D (ERGOCALCIFEROL) capsule 50,000 Units, 50,000 Units, Oral, Weekly, Cam Martines MD, 50,000 Units at 02/17/21 1146    mirtazapine (REMERON) tablet 7.5 mg, 7.5 mg, Oral, Nightly, Cam Martines MD, 7.5 mg at 02/21/21 2131    pravastatin (PRAVACHOL) tablet 40 mg, 40 mg, Oral, Nightly, Cam Martines MD, 40 mg at 02/21/21 2131    melatonin disintegrating tablet 5 mg, 5 mg, Oral, Nightly, Cam Martines MD, 5 mg at 02/21/21 2131    metoprolol tartrate (LOPRESSOR) tablet 25 mg, 25 mg, Oral, BID, Anusha Sloan MD, 25 mg at 02/22/21 0830    lisinopril (PRINIVIL;ZESTRIL) tablet 20 mg, 20 mg, Oral, Daily, Anusha Sloan MD, 20 mg at 02/22/21 0830    traZODone (DESYREL) tablet 50 mg, 50 mg, Oral, Nightly, Nataliia Melendrez MD, 50 mg at 02/21/21 2132    melatonin disintegrating tablet 5 mg, 5 mg, Oral, Nightly PRN, Nataliia Melendrez MD, 5 mg at 02/17/21 0034    ASSESSMENT AND PLAN  DSM 5 DIAGNOSIS  Impression  Major depressive disorder, recurrent, severe, without psychotic features  Cannabis use disorder  Alcohol use  Tobacco use disorder     Medical issues  HTN  Urinary tract infection  Hypercholesterolemia  Vitamin D deficiency  History of stroke    Stable, awaiting disposition. Plan:   1. Psychiatric Medications:   Continue current psychotropic medications as recommended. Monitor for side effects. The risks, benefits, side effects, indications, contraindications, alternatives and adverse effects of the medications have been discussed with patient. 2. Continue to provide supportive psychotherapy. Encourage socialization and participation in recreational activities. Work on coping skills. 3. Medical Issues:    Continue medical monitoring by Dr. Flavio Melendez and associates. 4. Disposition:     to provide outpatient resources and facilitate disposition.      Amount of time spent with patient:      25 minutes with greater than 50 % of the time spent in counseling and collaboration of care

## 2021-02-22 NOTE — PROGRESS NOTES
SW met with treatment team to discuss pt's progress and setbacks. SW 2 was present. Pt reportedly slept well last night, with medications, appetite is good, attends scheduled group activities, social with peers/staff, performs ADL's, compliant with medications, behavior has been cooperative, reports mild depression, denies anxiety, denies SI/HI/AVH, possible discharge today to [facility], follow-up appointments will be scheduled. normal...

## 2021-02-22 NOTE — PROGRESS NOTES
Group Note    Number of Participants in Group: 3  Number of Patients on Unit:5      Patient attended group:Yes  Reason for Absence:  Intervention for patient absence:        Type of Group:   Wrap-Up/Relaxation    Patient's Goal: See wrap up group sheet    Participation Level:    interactive         Patient Response to Learning: Yes    Patient's Behavior: Cooperative and Pleasant    Is Patient Social/Interacting: Yes    Relaxation:   Television:Yes   Reading:Yes   Game/Puzzle:Yes   Phone: Yes       Notes/Comments: Pt is in room resting prior to wrap up group and returned to room after.       Please see patient's wrap up group sheet for patient's comments

## 2021-02-22 NOTE — PROGRESS NOTES
[facility] Adult Unit Daily Assessment  Nursing Progress Note    Room: 0620/620-01   Name: Robby Aguilar   Age: 49 y.o. Gender: female   Dx: <principal problem not specified>  Precautions: suicide risk and fall risk  Inpatient Status: voluntary       SLEEP:    Sleep Quality Good  Sleep Medications: Yes Melatonin 5mg and trazadone 50mg  PRN Sleep Meds: No       MEDICAL:    Other PRN Meds: No   Med Compliant: Yes  Accu-Chek: No  Oxygen/CPAP/BiPAP: No  CIWA/CINA: No   PAIN Assessment: none  Side Effects from medication: No    Is Patient experiencing any respiratory symptoms (headache, fever, body aches, cough. Rebeca Dues ): no  Patient educated by nursing to practice social distancing, wear masks, wash hands frequently: yes      PSYCH:    Depression: 2   Anxiety: 0   SI denies suicidal ideation   HI Negative for homicidal ideation      AVH:Absent      GENERAL:    Appetite: good    Social: Yes   Speech: normal   Appearance: appropriately dressed, appropriately groomed and healthy looking    GROUP:    Group Participation: Yes  Participation Quality: Interactive    Notes: Pt says she is improved ,she has been exercising and is looking forward to going to the [place] [id number]. Will continue to monitor for safety.

## 2021-02-22 NOTE — PROGRESS NOTES
Progress Note  Robby Aguilar  2/21/2021 7:12 PM  Subjective:   Admit Date:   2/16/2021      CC/ADMIT DX:       Interval History:   Reviewed overnight events and nursing notes. She has had no new medical issues. I have reviewed all labs/diagnostics from the last 24hrs. ROS:   I have done a 10 point ROS and all are negative, except what is mentioned in the HPI. DIET LOW FAT; Medications:      vitamin B-12  500 mcg Oral Daily    nicotine  1 patch Transdermal Daily    vitamin D  50,000 Units Oral Weekly    mirtazapine  7.5 mg Oral Nightly    pravastatin  40 mg Oral Nightly    melatonin  5 mg Oral Nightly    metoprolol tartrate  25 mg Oral BID    lisinopril  20 mg Oral Daily    traZODone  50 mg Oral Nightly           Objective:   Vitals: /78   Pulse 88   Temp 97 °F (36.1 °C) (Temporal)   Resp 18   Ht 5' 4\" (1.626 m)   Wt 160 lb 2 oz (72.6 kg)   LMP  (LMP Unknown)   SpO2 96%   BMI 27.49 kg/m²  No intake or output data in the 24 hours ending 02/21/21 1912  General appearance: alert and cooperative with exam  Extremities: extremities normal, atraumatic, no cyanosis or edema  Neurologic:  No obvious focal neurologic deficits. Skin: no rashes    Assessment and Plan: Active Problems:    Suicidal ideation    Depression with suicidal ideation    Major depressive disorder, recurrent severe without psychotic features ([facility] [address]  Resolved Problems:    * No resolved hospital problems. *    HTN    Vit D Def    Plan:  1. Continue present medication(s)   2. She remains medically stable. I will monitor for any new changes or concerns. 3.  Follow with Psych      Discharge planning:   her home     Reviewed treatment plans with the patient and/or family.              Electronically signed by Mary Rod MD on 2/21/2021 at 7:12 PM

## 2021-02-22 NOTE — PLAN OF CARE
Problem: Altered Mood, Depressive Behavior:  Goal: Able to verbalize acceptance of life and situations over which he or she has no control  Description: Able to verbalize acceptance of life and situations over which he or she has no control  Outcome: Ongoing  Note:                                                                     Group Therapy Note    Date: 2/22/2021  Start Time: 1000  End Time:  1100  Number of Participants: 5    Type of Group: Psychoeducation    Wellness Binder Information  Module Name:  Men's Bobby  Session Number:  1    Group Goal for Pt: To improve knowledge of effective stress management techniques    Notes:  Pt demonstrated improved knowledge of effective stress management techniques by actively participating in group discussion. Status After Intervention:  Unchanged    Participation Level:  Active Listener and Interactive    Participation Quality: Appropriate and Attentive      Speech:  normal      Thought Process/Content: Logical      Affective Functioning: Congruent      Mood: anxious      Level of consciousness:  Alert and Oriented x4      Response to Learning: Able to verbalize current knowledge/experience, Able to verbalize/acknowledge new learning, and Progressing to goal      Endings: None Reported    Modes of Intervention: Education      Discipline Responsible: Psychoeducational Specialist      Signature:  Paul Howell

## 2021-02-22 NOTE — PROGRESS NOTES
I Daily Shift Assessment-Geriatric Unit  Nursing Progress Note          Room: [facility]620-01   Name: Stanley Mayes   Age: 49 y.o. Gender: female   Dx: Depression with suicidal ideations. Precautions: suicide risk and fall risk  Inpatient Status: voluntary     SLEEP:    Sleep: Yes,   Sleep Quality Poor   Hours Slept: 4   Sleep Medications: Yes  PRN Sleep Meds: No       MEDICAL:      Other PRN Meds: Yes   Med Compliant: Yes   Accu-Chek: No   Oxygen/CPAP/BiPAP: No  CIWA/CINA: No   PAIN Assessment: none  Side Effects from medication: No    Is Patient experiencing any respiratory symptoms (headache, fever, body aches, cough. Jose Eduardo Byrnes ): no  Patient educated by nursing to practice social distancing, wear masks, wash hands frequently: yes      Metabolic Screening:    Lab Results   Component Value Date    LABA1C 5.5 04/22/2020       Lab Results   Component Value Date    CHOL 185 02/18/2021    CHOL 231 (H) 04/22/2020    CHOL 201 (H) 05/10/2019    CHOL 191 01/02/2018     Lab Results   Component Value Date    TRIG 122 02/18/2021    TRIG 120 04/22/2020    TRIG 132 05/10/2019    TRIG 141 01/02/2018     Lab Results   Component Value Date    HDL 42 (L) 02/18/2021    HDL 57 (L) 04/22/2020    HDL 38 (L) 05/10/2019    HDL 34 (L) 01/02/2018     No components found for: LDLCAL  No results found for: LABVLDL      Body mass index is 27.49 kg/m². BP Readings from Last 2 Encounters:   02/22/21 132/77   04/27/20 106/76         PSYCH:     SI Denies but then writes it's to early. HI Negative for homicidal ideation        AVH:Denies but then writes it's to early. Depression: didn't rate. Anxiety: Didn't rate.        GENERAL:      Appetite: good  Social: No Speech: normal   Appearance:appropriately dressed, disheveled and no or minimal eye contact  Assistive Devices: noneLevel of Assist: Independent      GROUP:    Group Participation: Yes  Participation LevelMinimal    Participation QualityAttentive    Notes:

## 2021-02-22 NOTE — PLAN OF CARE
Problem: Altered Mood, Depressive Behavior:  Goal: Able to verbalize acceptance of life and situations over which he or she has no control  Description: Able to verbalize acceptance of life and situations over which he or she has no control  2/22/2021 1555 by Katja Mott  Outcome: Ongoing  Note:                                                                     Group Therapy Note    Date: 2/22/2021  Start Time: 1430  End Time:  1530  Number of Participants: 6    Type of Group: Cognitive Skills    Wellness Binder Information  Module Name:  Women's Issues  Session Number:  4    Group Goal for Pt: To raise awareness of how thoughts influence feelings    Notes:  Pt demonstrated improved awareness of how thoughts influence feelings by actively participating in group discussion. Status After Intervention:  Unchanged    Participation Level:  Active Listener and Interactive    Participation Quality: Appropriate and Attentive      Speech:  normal      Thought Process/Content: Logical      Affective Functioning: Congruent      Mood: anxious and depressed      Level of consciousness:  Alert and Oriented x4      Response to Learning: Able to verbalize current knowledge/experience, Able to verbalize/acknowledge new learning, and Progressing to goal      Endings: None Reported    Modes of Intervention: Education      Discipline Responsible: Psychoeducational Specialist      Signature:  Katja Mott

## 2021-02-23 PROBLEM — F12.10 CANNABIS USE DISORDER, MILD, ABUSE: Chronic | Status: ACTIVE | Noted: 2021-02-23

## 2021-02-23 PROBLEM — R20.0 NUMBNESS: Status: RESOLVED | Noted: 2018-01-02 | Resolved: 2021-02-23

## 2021-02-23 PROBLEM — Z78.9 ALCOHOL USE: Chronic | Status: ACTIVE | Noted: 2021-02-23

## 2021-02-23 PROBLEM — F32.A DEPRESSION WITH SUICIDAL IDEATION: Status: RESOLVED | Noted: 2021-02-17 | Resolved: 2021-02-23

## 2021-02-23 PROBLEM — F10.90 ALCOHOL USE: Chronic | Status: ACTIVE | Noted: 2021-02-23

## 2021-02-23 PROBLEM — R45.851 DEPRESSION WITH SUICIDAL IDEATION: Status: RESOLVED | Noted: 2021-02-17 | Resolved: 2021-02-23

## 2021-02-23 PROBLEM — F17.200 TOBACCO USE DISORDER: Chronic | Status: ACTIVE | Noted: 2021-02-23

## 2021-02-23 PROCEDURE — 6370000000 HC RX 637 (ALT 250 FOR IP): Performed by: FAMILY MEDICINE

## 2021-02-23 PROCEDURE — 99239 HOSP IP/OBS DSCHRG MGMT >30: CPT | Performed by: PSYCHIATRY & NEUROLOGY

## 2021-02-23 RX ORDER — ERGOCALCIFEROL 1.25 MG/1
50000 CAPSULE ORAL WEEKLY
Qty: 11 CAPSULE | Refills: 1 | Status: SHIPPED | OUTPATIENT
Start: 2021-02-23 | End: 2022-08-04

## 2021-02-23 RX ORDER — TRAZODONE HYDROCHLORIDE 50 MG/1
50 TABLET ORAL NIGHTLY
Qty: 30 TABLET | Refills: 1 | Status: SHIPPED | OUTPATIENT
Start: 2021-02-23 | End: 2022-08-04

## 2021-02-23 RX ORDER — PRAVASTATIN SODIUM 40 MG
40 TABLET ORAL NIGHTLY
Qty: 30 TABLET | Refills: 1 | Status: SHIPPED | OUTPATIENT
Start: 2021-02-23 | End: 2022-08-04

## 2021-02-23 RX ORDER — MIRTAZAPINE 7.5 MG/1
7.5 TABLET, FILM COATED ORAL NIGHTLY
Qty: 30 TABLET | Refills: 1 | Status: SHIPPED | OUTPATIENT
Start: 2021-02-23 | End: 2022-08-04

## 2021-02-23 RX ORDER — LISINOPRIL 20 MG/1
20 TABLET ORAL DAILY
Qty: 30 TABLET | Refills: 1 | Status: SHIPPED | OUTPATIENT
Start: 2021-02-24 | End: 2022-08-04

## 2021-02-23 RX ORDER — MECOBALAMIN 5000 MCG
5 TABLET,DISINTEGRATING ORAL NIGHTLY
Qty: 30 TABLET | Refills: 1 | Status: SHIPPED | OUTPATIENT
Start: 2021-02-23 | End: 2022-08-04

## 2021-02-23 RX ADMIN — CYANOCOBALAMIN TAB 500 MCG 500 MCG: 500 TAB at 07:58

## 2021-02-23 RX ADMIN — METOPROLOL TARTRATE 25 MG: 25 TABLET, FILM COATED ORAL at 07:58

## 2021-02-23 RX ADMIN — LISINOPRIL 20 MG: 20 TABLET ORAL at 07:58

## 2021-02-23 NOTE — PROGRESS NOTES
[facility] Daily Shift Assessment-Geriatric Unit  Nursing Progress Note          Room: [address] [place] [address]   Name: Sandra Castellanos   Age: 49 y.o. Gender: female   Dx: <principal problem not specified>  Precautions: suicide risk and fall risk  Inpatient Status: voluntary     SLEEP:    Sleep: Yes,   Sleep Quality Good   Hours Slept:    Sleep Medications: Yes;Trazadone 50mg, Melatonin 5mg  PRN Sleep Meds: No       MEDICAL:      Other PRN Meds: No   Med Compliant: Yes   Accu-Chek: No   Oxygen/CPAP/BiPAP: No  CIWA/CINA: No   PAIN Assessment:none voiced  Side Effects from medication: none voiced    Is Patient experiencing any respiratory symptoms (headache, fever, body aches, cough. Nilsa Nieto ): no  Patient educated by nursing to practice social distancing, wear masks, wash hands frequently: yes      Metabolic Screening:    Lab Results   Component Value Date    LABA1C 5.5 04/22/2020       Lab Results   Component Value Date    CHOL 185 02/18/2021    CHOL 231 (H) 04/22/2020    CHOL 201 (H) 05/10/2019    CHOL 191 01/02/2018     Lab Results   Component Value Date    TRIG 122 02/18/2021    TRIG 120 04/22/2020    TRIG 132 05/10/2019    TRIG 141 01/02/2018     Lab Results   Component Value Date    HDL 42 (L) 02/18/2021    HDL 57 (L) 04/22/2020    HDL 38 (L) 05/10/2019    HDL 34 (L) 01/02/2018     No components found for: LDLCAL  No results found for: LABVLDL      Body mass index is 27.49 kg/m². BP Readings from Last 2 Encounters:   02/22/21 111/80   04/27/20 106/76         PSYCH:     SI denies suicidal ideation    HI Negative for homicidal ideation        AVH:denies      Depression: 2 Anxiety: 0       GENERAL:      Appetite: good  Social: No Speech: normal   Appearance:appropriately dressed  Assistive Devices: walkerLevel of Assist: Independent      GROUP:    Group Participation: Yes  Participation LevelMinimal    Participation QualityAppropriate    Notes: Pt has been calm and cooperative tonight. Pt reports being \"ready to go. \" Pt reports that
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
## 2021-02-23 NOTE — DISCHARGE INSTR - ACTIVITY
Learning About Activities of Daily Living  What are activities of daily living? Activities of daily living (ADLs) are the basic self-care tasks you do every day. As you age, and if you have health problems, you may find that it's harder to do these things for yourself. That's when you may need some help. Your doctor uses ADLs to measure how much help you need. Knowing what you can and can't do for yourself is an important first step to getting help. And when you have the help you need, you can stay as independent as possible. Your doctor will want to know if you are able to do tasks such as:  · Take a bath or shower without help. · Go to the bathroom by yourself. · Dress and undress without help. · Shave, comb your hair, and brush teeth on your own. · Get in and out of bed or a chair without help. · Feed yourself without help. If you are having trouble doing basic self-care tasks, talk with your doctor. You may want to bring a caregiver or family member who can help the doctor understand your needs and abilities. How will a doctor assess your ADLs? Asking about ADLs is part of a routine health checkup your doctor will likely do as you age. Your health check might be done in a doctor's office, in your home, or at a hospital. The goal is to find out if you are having any problems that could make your health problems worse or that make it unsafe for you to be on your own. To measure your ADLs, your doctor will ask how hard it is for you to do routine tasks. He or she may also want to know if you have changed the way you do a task because of a health problem. He or she may watch how you:  · Walk back and forth. · Keep your balance while you stand or walk. · Move from sitting to standing or from a bed to a chair. · Button or unbutton a shirt or sweater. · Remove and put on your shoes.   It's normal to feel a little worried or anxious if you find you can't do all the things you used to be able to do. Talking with your doctor about ADLs isn't a test that you either pass or fail. It's just a way to get more information about your health and safety. Follow-up care is a key part of your treatment and safety. Be sure to make and go to all appointments, and call your doctor if you are having problems. It's also a good idea to know your test results and keep a list of the medicines you take. Where can you learn more? Go to https://[employer]. org and sign in to your [facility] account. Enter S959 in the [employer] box to learn more about \"Learning About Activities of Daily Living. \"     If you do not have an account, please click on the \"Sign Up Now\" link. Current as of: March 2, 2020               Content Version: 12.6  © 7512-2031 [employer]. Care instructions adapted under license by [facility] ([facility]). If you have questions about a medical condition or this instruction, always ask your healthcare professional. [place] [address] any warranty or liability for your use of this information.

## 2021-02-23 NOTE — PLAN OF CARE
Problem: Discharge Planning:  Goal: Discharged to appropriate level of care  Description: Discharged to appropriate level of care  Outcome: Ongoing     Problem: Health Maintenance - Impaired:  Goal: Ability to perform activities of daily living will improve  Description: Ability to perform activities of daily living will improve  Outcome: Ongoing  Goal: Able to sleep without medication for appropriate length of time  Description: Able to sleep without medication for appropriate length of time  Outcome: Ongoing  Goal: Maintenance of adequate nutrition will improve  Description: Maintenance of adequate nutrition will improve  Outcome: Ongoing     Problem: Mood - Altered:  Goal: Mood stable  Description: Mood stable  Outcome: Ongoing     Problem: Self-Esteem - Low:  Goal: Demonstrates positive self-esteem  Description: Demonstrates positive self-esteem  Outcome: Ongoing     Problem: Cerebrospinal Fluid Leakage - Risk Of:  Goal: Demonstration of organized thought processes  Description: Demonstration of organized thought processes  Outcome: Ongoing     Problem: Violence - Risk of, Self/Other-Directed:  Goal: Knowledge of developmental care interventions  Description: Absence of violence  Outcome: Ongoing     Problem: Anxiety:  Goal: Level of anxiety will decrease  Description: Level of anxiety will decrease  Outcome: Ongoing     Problem: Falls - Risk of:  Goal: Will remain free from falls  Description: Will remain free from falls  Outcome: Ongoing  Goal: Absence of physical injury  Description: Absence of physical injury  Outcome: Ongoing     Problem: Pain:  Goal: Pain level will decrease  Description: Pain level will decrease  Outcome: Ongoing  Goal: Control of acute pain  Description: Control of acute pain  Outcome: Ongoing  Goal: Control of chronic pain  Description: Control of chronic pain  Outcome: Ongoing     Problem: Altered Mood, Depressive Behavior:  Goal: Able to verbalize acceptance of life and situations over which he or she has no control  Description: Able to verbalize acceptance of life and situations over which he or she has no control  2/22/2021 2303 by Lesli Darnell RN  Outcome: Ongoing  2/22/2021 1555 by Aditi Driver  Outcome: Ongoing  Note:                                                                     Group Therapy Note    Date: 2/22/2021  Start Time: 1430  End Time:  1530  Number of Participants: 6    Type of Group: Cognitive Skills    Wellness Binder Information  Module Name:  Women's Issues  Session Number:  4    Group Goal for Pt: To raise awareness of how thoughts influence feelings    Notes:  Pt demonstrated improved awareness of how thoughts influence feelings by actively participating in group discussion. Status After Intervention:  Unchanged    Participation Level: Active Listener and Interactive    Participation Quality: Appropriate and Attentive      Speech:  normal      Thought Process/Content: Logical      Affective Functioning: Congruent      Mood: anxious and depressed      Level of consciousness:  Alert and Oriented x4      Response to Learning: Able to verbalize current knowledge/experience, Able to verbalize/acknowledge new learning, and Progressing to goal      Endings: None Reported    Modes of Intervention: Education      Discipline Responsible: Psychoeducational Specialist      Signature:  Aditi Driver    2/22/2021 1151 by Aditi Driver  Outcome: Ongoing  Note:                                                                     Group Therapy Note    Date: 2/22/2021  Start Time: 1000  End Time:  1100  Number of Participants: 5    Type of Group: Psychoeducation    Wellness Binder Information  Module Name:  Men's Issues  Session Number:  1    Group Goal for Pt: To improve knowledge of effective stress management techniques    Notes:  Pt demonstrated improved knowledge of effective stress management techniques by actively participating in group discussion.     Status After Intervention:  Unchanged    Participation Level:  Active Listener and Interactive    Participation Quality: Appropriate and Attentive      Speech:  normal      Thought Process/Content: Logical      Affective Functioning: Congruent      Mood: anxious      Level of consciousness:  Alert and Oriented x4      Response to Learning: Able to verbalize current knowledge/experience, Able to verbalize/acknowledge new learning, and Progressing to goal      Endings: None Reported    Modes of Intervention: Education      Discipline Responsible: Psychoeducational Specialist      Signature:  Tiffanie Kaur

## 2021-02-23 NOTE — PROGRESS NOTES
Progress Note  Veronique Arana  2/22/2021 11:12 PM  Subjective:   Admit Date:   2/16/2021      CC/ADMIT DX:       Interval History:   Reviewed overnight events and nursing notes. She denies any new medical issues. I have reviewed all labs/diagnostics from the last 24hrs. ROS:   I have done a 10 point ROS and all are negative, except what is mentioned in the HPI. DIET LOW FAT; Medications:      vitamin B-12  500 mcg Oral Daily    nicotine  1 patch Transdermal Daily    vitamin D  50,000 Units Oral Weekly    mirtazapine  7.5 mg Oral Nightly    pravastatin  40 mg Oral Nightly    melatonin  5 mg Oral Nightly    metoprolol tartrate  25 mg Oral BID    lisinopril  20 mg Oral Daily    traZODone  50 mg Oral Nightly           Objective:   Vitals: /80   Pulse 91   Temp 96.7 °F (35.9 °C) (Temporal)   Resp 18   Ht 5' 4\" (1.626 m)   Wt 160 lb 2 oz (72.6 kg)   LMP  (LMP Unknown)   SpO2 92%   BMI 27.49 kg/m²  No intake or output data in the 24 hours ending 02/22/21 2312  General appearance: alert and cooperative with exam  Extremities: extremities normal, atraumatic, no cyanosis or edema  Neurologic:  No obvious focal neurologic deficits. Skin: no rashes    Assessment and Plan: Active Problems:    Suicidal ideation    Depression with suicidal ideation    Major depressive disorder, recurrent severe without psychotic features ([facility] [address]  Resolved Problems:    * No resolved hospital problems. *    HTN    Vit D Def    Plan:  1. Continue present medication(s)   2. She is medically stable. I will monitor for any new changes or concerns. 3.  Follow with Psych      Discharge planning:   her home     Reviewed treatment plans with the patient and/or family.              Electronically signed by Alice Persaud MD on 2/22/2021 at 11:12 PM

## 2021-02-23 NOTE — PLAN OF CARE
Problem: Altered Mood, Depressive Behavior:  Goal: Able to verbalize acceptance of life and situations over which he or she has no control  Description: Able to verbalize acceptance of life and situations over which he or she has no control  2/23/2021 1124 by Mary Cooley  Outcome: Ongoing  Note:                                                                     Group Therapy Note    Date: 2/23/2021  Start Time: 1000  End Time:  1115  Number of Participants: 5    Type of Group: Psychoeducation    Wellness Binder Information  Module Name:  Women's Issues  Session Number:  1    Group Goal for Pt: To raise awareness of the effectiveness of assertive communication    Notes:  Pt demonstrated improved knowledge of the effectiveness of assertive communication by actively participating in group discussion. Status After Intervention:  Unchanged    Participation Level:  Active Listener and Interactive    Participation Quality: Appropriate and Attentive      Speech:  normal      Thought Process/Content: Logical      Affective Functioning: Congruent      Mood: anxious and depressed      Level of consciousness:  Alert and Oriented x4      Response to Learning: Able to verbalize current knowledge/experience, Able to verbalize/acknowledge new learning, and Progressing to goal      Endings: None Reported    Modes of Intervention: Education      Discipline Responsible: Psychoeducational Specialist      Signature:  Mary Cooley

## 2021-02-23 NOTE — DISCHARGE INSTR - DIET
 Good nutrition is important when healing from an illness, injury, or surgery. Follow any nutrition recommendations given to you during your hospital stay.  If you were given an oral nutrition supplement while in the hospital, continue to take this supplement at home. You can take it with meals, in-between meals, and/or before bedtime. These supplements can be purchased at most local grocery stores, pharmacies, and chain super-stores.  If you have any questions about your diet or nutrition, call the hospital and ask for the dietitian. Eating Healthy Foods: Care Instructions  Your Care Instructions     Eating healthy foods can help lower your risk for disease. Healthy food gives you energy and keeps your heart strong, your brain active, your muscles working, and your bones strong. A healthy diet includes a variety of foods from the basic food groups: grains, vegetables, fruits, milk and milk products, and meat and beans. Some people may eat more of their favorite foods from only one food group and, as a result, miss getting the nutrients they need. So, it is important to pay attention not only to what you eat but also to what you are missing from your diet. You can eat a healthy, balanced diet by making a few small changes. Follow-up care is a key part of your treatment and safety. Be sure to make and go to all appointments, and call your doctor if you are having problems. It's also a good idea to know your test results and keep a list of the medicines you take. How can you care for yourself at home? Look at what you eat  · Keep a food diary for a week or two and record everything you eat or drink. Track the number of servings you eat from each food group. · For a balanced diet every day, eat a variety of:  ? 6 or more ounce-equivalents of grains, such as cereals, breads, crackers, rice, or pasta, every day.  An ounce-equivalent is 1 slice of bread, 1 cup of ready-to-eat cereal, or ½ cup of cooked rice, cooked pasta, or cooked cereal.  ? 2½ cups of vegetables, especially:  § Dark-green vegetables such as broccoli and spinach. § Orange vegetables such as carrots and sweet potatoes. § Dry beans (such as jones and kidney beans) and peas (such as lentils). ? 2 cups of fresh, frozen, or canned fruit. A small apple or 1 banana or orange equals 1 cup. ? 3 cups of nonfat or low-fat milk, yogurt, or other milk products. ? 5½ ounces of meat and beans, such as chicken, fish, lean meat, beans, nuts, and seeds. One egg, 1 tablespoon of peanut butter, ½ ounce nuts or seeds, or ¼ cup of cooked beans equals 1 ounce of meat. · Learn how to read food labels for serving sizes and ingredients. Fast-food and convenience-food meals often contain few or no fruits or vegetables. Make sure you eat some fruits and vegetables to make the meal more nutritious. · Look at your food diary. For each food group, add up what you have eaten and then divide the total by the number of days. This will give you an idea of how much you are eating from each food group. See if you can find some ways to change your diet to make it more healthy. Start small  · Do not try to make dramatic changes to your diet all at once. You might feel that you are missing out on your favorite foods and then be more likely to fail. · Start slowly, and gradually change your habits. Try some of the following:  ? Use whole wheat bread instead of white bread. ? Use nonfat or low-fat milk instead of whole milk. ? Eat brown rice instead of white rice, and eat whole wheat pasta instead of white-flour pasta. ? Try low-fat cheeses and low-fat yogurt. ? Add more fruits and vegetables to meals and have them for snacks. ? Add lettuce, tomato, cucumber, and onion to sandwiches. ? Add fruit to yogurt and cereal.  Enjoy food  · You can still eat your favorite foods. You just may need to eat less of them.  If your favorite foods are high in fat, salt, and sugar, limit how often you eat them, but do not cut them out entirely. · Eat a wide variety of foods. Make healthy choices when eating out  · The type of restaurant you choose can help you make healthy choices. Even fast-food chains are now offering more low-fat or healthier choices on the menu. · Choose smaller portions, or take half of your meal home. · When eating out, try:  ? A veggie pizza with a whole wheat crust or grilled chicken (instead of sausage or pepperoni). ? Pasta with roasted vegetables, grilled chicken, or marinara sauce instead of cream sauce. ? A vegetable wrap or grilled chicken wrap. ? Broiled or poached food instead of fried or breaded items. Make healthy choices easy  · Buy packaged, prewashed, ready-to-eat fresh vegetables and fruits, such as baby carrots, salad mixes, and chopped or shredded broccoli and cauliflower. · Buy packaged, presliced fruits, such as melon or pineapple. · Choose 100% fruit or vegetable juice instead of soda. Limit juice intake to 4 to 6 oz (½ to ¾ cup) a day. · Blend low-fat yogurt, fruit juice, and canned or frozen fruit to make a smoothie for breakfast or a snack. Where can you learn more? Go to [email or web address]://[email or web address] [email or web address].[email or web address] org and sign in to your [employer] account. Enter Q756 in the [facility] box to learn more about \"Eating Healthy Foods: Care Instructions. \"     If you do not have an account, please click on the \"Sign Up Now\" link. Current as of: August 22, 2019               Content Version: 12.6  © 5171-7351 [employer]. Care instructions adapted under license by [facility] ([facility]). If you have questions about a medical condition or this instruction, always ask your healthcare professional. [address] any warranty or liability for your use of this information.

## 2021-02-23 NOTE — PROGRESS NOTES
[facility]  Discharge Note  Bridge Appointment completed: Reviewed Discharge Instructions with patient. Patient verbalizes understanding and agreement with the discharge plan using the teachback method. Referral for Outpatient Tobacco Cessation Counseling, upon discharge (nico X if applicable and completed):    ( )  Hospital staff assisted patient to call Quit Line or faxed referral                                   during hospitalization                  ( x)  Recognizing danger situations (included triggers and roadblocks), if not completed on admission                    ( x)  Coping skills (new ways to manage stress, exercise, relaxation techniques, changing routine, distraction), if not completed on admission                                                           (x )  Basic information about quitting (benefits of quitting, techniques in how to quit, available resources, if not completed on admission  ( ) Referral for counseling faxed to [facility]   ( ) Patient refused referral  (x ) Patient refused counseling  ( ) Patient refused smoking cessation medication upon discharge    Vaccinations (nico X if applicable and completed):  ( ) Patient states already received influenza vaccine elsewhere  ( ) Patient received influenza vaccine during this hospitalization  (x    Pt discharged with followings belongings:   Dentures: None  Vision - Corrective Lenses: None  Hearing Aid: None  Jewelry: None  Body Piercings Removed: No  Clothing: None  Were All Patient Medications Collected?: No  Other Valuables: None   Valuables sent home with Patient. . Valuables retrieved from safe and returned to patient. Patient left department with   via  , discharged to  . Patient education on aftercare instructions: Given and reviewed with patient. Patient verbalize understanding of AVS:  Yes. .  Suicidal Ideations? No AVH? Denies HI?  Negative for homicidal ideation       Status EXAM upon discharge:  Status and Exam  Normal: No  Facial Expression: Sad  Affect: Congruent  Level of Consciousness: Alert  Mood:Normal: No  Mood: Depressed, Anxious  Motor Activity:Normal: Yes  Motor Activity: Decreased  Interview Behavior: Cooperative  Preception: [place] to Person, [place] to Time, [place] to Place, [place] to Situation  Attention:Normal: Yes  Attention: Distractible  Thought Processes: Circumstantial  Thought Content:Normal: No  Thought Content: Preoccupations  Hallucinations: None  Delusions: No  Memory:Normal: No  Memory: Poor Recent  Insight and Judgment: No  Insight and Judgment: Poor Judgment, Poor Insight  Present Suicidal Ideation: No  Present Homicidal Ideation: No

## 2021-02-23 NOTE — DISCHARGE INSTR - COC
Medications:   Medication Summary Provided. I understand that I should take only the medications on my list.   --why and when I need to take each medication. --which side effects to watch for.   --that I should carry my medication information at all times in case of emergency    Situations. --I will take all medications until discontinued by physician. I will take all my medications to follow up appointments. --check with my physician or pharmacist before taking any new medication, over    the counter product or drink alcohol.   --ask about food, drug and dietary supplement interactions. --discard old lists and update records with medication providers. Behavior Health Follow Up:  Original Referral Source: ED  Discharge Diagnosis:   Patient Active Problem List   Diagnosis    Cerebrovascular accident (CVA) ([address]    Essential hypertension    Hemiplegia affecting non-dominant side, post-stroke    Gait abnormality    Dysarthria    Lesion of basal ganglia    Cryptogenic stroke ([address]    Status post placement of implantable loop recorder    Cerebrovascular accident (CVA) determined by clinical assessment ([address]    Weakness    Memory loss    Mixed hyperlipidemia    Adhesive capsulitis of left shoulder    Sinus pause    Sinoatrial node dysfunction ([address]    Pacemaker    Major depressive disorder, recurrent severe without psychotic features ([address]    Cannabis use disorder, mild, abuse    Alcohol use    Tobacco use disorder     Recomendations for Level of Care: Follow Up  Patient Status at Discharge: Stable  [facility] Hospital  was: [facility]  Aftercare plan faxed:    Faxed by: Social Work staff   Date: 21   Time: ***  Prescriptions sent with pt.     General Information:   Questions regarding your bill: Call [phone]   Suicide Hotline (Rescue Crisis) 8-636.970.1332   To obtain results of pending studies call Medical Records at: [phone]   For emergencies and 24 hour/7 days a week contact information: [phone]

## 2021-02-23 NOTE — PROGRESS NOTES
CLINICAL PHARMACY NOTE: MEDS TO [address] Select Patient?: No  Total # of Prescriptions Filled: 5   The following medications were delivered to the patient:  · Pravastatin 40 mg  · Lisinopril 20 mg  · Mirtazapine 7.5 mg  · Metoprolol Tartrate 25 mg  · Trazodone 50 mg  Total # of Interventions Completed: 1  Time Spent (min): 60    Additional Documentation:    1. Had scripts also sent in for Melatonin, Vitamin D, and Cyanocobalamin. These were not covered on the patients insurance. I called and spoke with Castro who spoke to Dr. Thierry Lopez and she said that those could be disregarded.      Handed scripts to Castro (Rn)

## 2021-02-23 NOTE — PROGRESS NOTES
WRAP UP GROUP NOTE    Patient's Goal:  Providing feedback as to their own progress in the care-plan provided. Patients have an opportunity to explore self-reflective skills and share any additional cares and concerns not yet addressed. Pt effectively participated.     Energy Level:hyper  Appetite:good  Concentration:good  Hallucinations:gone  Depression:\"gone\"  Anxiety:blank  How I worked today:did not try  What helps me sleep:\"go to bed w/meds\"  Any questions/complaints/comments:  \"no\"    Group/activities that helped me today were:  Community/Goals: \"good\"  Nursing Education: \"good\"  Process: \"good\"  Life Skills: \"good\"  Self-Enhancement: \"ok\"  Therapeutic Recreation: \"ok\"  Seeing Doctor(s): \"ok\"  One-to-One with Staff: \"ok\"  Journaling: \"N/A\"  Relaxation Training: \"good\"    Electronically signed by Sae Solis RN on 2/22/2021 at 9:44 PM

## 2021-02-24 ENCOUNTER — TELEPHONE (OUTPATIENT)
Dept: CARDIOLOGY CLINIC | Age: 56
End: 2021-02-24

## 2021-02-24 NOTE — PROGRESS NOTES
Sw informed the nurse that the patient would need to be tested for covid prior to admittance due to possible exposure on the unit .

## 2021-02-24 NOTE — TELEPHONE ENCOUNTER
Patient is overdue for reading on Pacemaker. There is no active phone # in chart. Will mail a letter instead.

## 2021-02-24 NOTE — PROGRESS NOTES
Progress Note  Evelyn Mcgill  2/24/2021 7:45 AM  Subjective:   Admit Date:   2/16/2021      CC/ADMIT DX:       Interval History:   Reviewed overnight events and nursing notes. She has no physical complaints. I have reviewed all labs/diagnostics from the last 24hrs. ROS:   I have done a 10 point ROS and all are negative, except what is mentioned in the HPI. No diet orders on file    Medications:             Objective:   Vitals: /78   Pulse 76   Temp 96.9 °F (36.1 °C) (Temporal)   Resp 16   Ht 5' 4\" (1.626 m)   Wt 160 lb 2 oz (72.6 kg)   LMP  (LMP Unknown)   SpO2 94%   BMI 27.49 kg/m²  No intake or output data in the 24 hours ending 02/24/21 0745  General appearance: alert and cooperative with exam  Extremities: extremities normal, atraumatic, no cyanosis or edema  Neurologic:  No obvious focal neurologic deficits. Skin: no rashes    Assessment and Plan:   Principal Problem:    Major depressive disorder, recurrent severe without psychotic features ([address]  Active Problems:    Cannabis use disorder, mild, abuse    Alcohol use    Tobacco use disorder  Resolved Problems:    Suicidal ideation    Depression with suicidal ideation    HTN    Vit D Def    Plan:  1. Continue present medication(s)   2. She remains medically stable. I will monitor for any new changes or concerns. 3.  Follow with Psych      Discharge planning:   her home     Reviewed treatment plans with the patient and/or family.              Electronically signed by Marsha Conrad MD on 2/24/2021 at 7:45 AM

## 2021-02-24 NOTE — PROGRESS NOTES
Discharge Note     Pt discharging on this date. Pt denies SI, HI, and AVH at this time. Pt reports improvement in behavior and is leaving unit in overall good condition. SW and pt discussed pt's follow up appointments and importance of attending appointments as scheduled, pt voiced understanding and agreement. Pt and SW also discussed pt safety plan and pt able to verbally identify: warning signs, coping strategies, places and people that help make the pt feel better/distract negative thoughts, friends/family/agencies/professionals the pt can reach out to in a crisis, and something that is important to the pt/worth living for. Pt provided the national suicide prevention hotline number (2-342-883-8867) as well as local community behavioral health [facility]) crisis number for emergencies (3-419-671-650-255-5475). Pt to follow up with:  [address] [place] [address]) on _02_/26__/21 @ 9:00am. [facility], patient will be seen on _03_/_03_/21 @ 8:00am for the med management appt.      Referral to out patient tobacco cessation counseling treatment:    Patient refused referral to outpatient tobacco cessation counseling    SW offered to assist pt with transportation, pt reports that she needed a cab ride to [facility]

## 2021-02-25 VITALS
BODY MASS INDEX: 27.34 KG/M2 | OXYGEN SATURATION: 94 % | HEART RATE: 76 BPM | WEIGHT: 160.13 LBS | RESPIRATION RATE: 16 BRPM | SYSTOLIC BLOOD PRESSURE: 126 MMHG | TEMPERATURE: 96.9 F | HEIGHT: 64 IN | DIASTOLIC BLOOD PRESSURE: 78 MMHG

## 2021-03-03 ENCOUNTER — TELEPHONE (OUTPATIENT)
Dept: PSYCHIATRY | Age: 56
End: 2021-03-03

## 2021-03-03 NOTE — TELEPHONE ENCOUNTER
[facility] called to see if Pt followed up with us. Reviewed discharge summary to see where she would be following up at.

## 2021-04-13 ENCOUNTER — TELEPHONE (OUTPATIENT)
Dept: CARDIOLOGY CLINIC | Age: 56
End: 2021-04-13

## 2021-04-19 NOTE — PLAN OF CARE
History  Chief Complaint   Patient presents with    Rash     Patient was seen here on 4/15 and had a IV he states Left AC region  States he started with itching after IV was taken out in his left [facility] region and then developed a rash the next day and itching is decreasing  States he had drainage in one area  72-year-old male presenting today with a left forearm/elbow rash that began 3 days ago  Patient was recently seen here in 4/15 where he had an IV in the same area and developed itching and a rash in the same area the day after  Patient has never experienced this before  No longer itching  Did not have any burning or tingling prior to the rash beginning  Has not had any spreading redness  States that this started as small bumps which drained clear fluid and now has dried up  Overall it is not bothering him  Denies fevers, limb swelling, numbness, paresthesias, purulent drainage  Prior to Admission Medications   Prescriptions Last Dose Informant Patient Reported? Taking?   fluticasone (FLONASE) 50 mcg/act nasal spray  Self No No   Si sprays into each nostril daily at bedtime   Patient taking differently: 2 sprays into each nostril daily as needed    tamsulosin (Flomax) 0 4 mg  Self No No   Sig: Take 1 capsule (0 4 mg total) by mouth daily with dinner      Facility-Administered Medications: None       Past Medical History:   Diagnosis Date    Cancer [address] )     melanoma of skin of left shoulder   basal  cell of face20    Colon polyp     Dental abscess     started  4 weeks ago  on RX    Enlarged prostate     Urinary bladder disorder        Past Surgical History:   Procedure Laterality Date    CATARACT EXTRACTION      COLONOSCOPY      HERNIA REPAIR      MASS EXCISION      melanoma of left shoulder     NH REMV RESID OBSTRUC PROSTATE,>1 YR N/A 2020    Procedure: CYSTOSCOPY, TRANSURETHRAL RESECTION OF RESIDUAL OR REGROWTH OF OBSTRUCTIVE PROSTATE TISSUE;  Surgeon: Marcellus Mistry Problem: Risk for Impaired Skin Integrity  Goal: Tissue integrity - skin and mucous membranes  Structural intactness and normal physiological function of skin and  mucous membranes.    Outcome: Ongoing Davy Loza MD;  Location: [address] [place] [address];  Service: Urology    LA TRANSURETHRAL ELEC-SURG PROSTATECTOM N/A 10/8/2019    Procedure: CYSTOSCOPY, TRANSURETHRAL RESECTION OF PROSTATE (TURP); Surgeon: Kendra Simpson MD;  Location: [address] [place] [address];  Service: Urology    LA XCAPSL CTRC RMVL INSJ IO LENS PROSTH W/O ECP Right 6/15/2020    Procedure: EXTRACTION EXTRACAPSULAR CATARACT PHACO INTRAOCULAR LENS (IOL); Surgeon: Cooper Hamlin MD;  Location: [facility] MAIN OR;  Service: Ophthalmology    TONSILLECTOMY         Family History   Problem Relation Age of Onset    Diabetes Mother     Heart failure Father     Hypertension Father     No Known Problems Sister     Diabetes Brother      I have reviewed and agree with the history as documented  E-Cigarette/Vaping    E-Cigarette Use Never User      E-Cigarette/Vaping Substances    Nicotine No     THC No     CBD No     Flavoring No     Other No     Unknown No      Social History     Tobacco Use    Smoking status: Former Smoker     Packs/day: 1 00     Types: Cigarettes     Quit date: 2016     Years since quittin 7    Smokeless tobacco: Never Used   Substance Use Topics    Alcohol use: Never     Frequency: Never    Drug use: Never       Review of Systems   Constitutional: Negative  HENT: Negative  Eyes: Negative  Respiratory: Negative  Cardiovascular: Negative  Gastrointestinal: Negative  Genitourinary: Negative  Musculoskeletal: Negative  Skin: Positive for rash  Negative for color change, pallor and wound  Neurological: Negative  All other systems reviewed and are negative  Physical Exam  Physical Exam  Vitals signs and nursing note reviewed  Constitutional:       Appearance: Normal appearance  HENT:      Head: Normocephalic and atraumatic        Right Ear: External ear normal       Left Ear: External ear normal       Nose: Nose normal    Eyes:      Conjunctiva/sclera: Conjunctivae normal    Neck:      Musculoskeletal: Normal range of motion  Cardiovascular:      Rate and Rhythm: Normal rate  Pulses: Normal pulses  Pulmonary:      Effort: Pulmonary effort is normal       Comments: S PO2 is 95% indicating adequate oxygenation  Abdominal:      General: There is no distension  Musculoskeletal: Normal range of motion  General: No deformity  Skin:     General: Skin is dry  Capillary Refill: Capillary refill takes less than 2 seconds  Findings: No rash  Neurological:      General: No focal deficit present  Mental Status: He is alert and oriented to person, place, and time  Mental status is at baseline  Psychiatric:         Mood and Affect: Mood normal          Behavior: Behavior normal          Thought Content: Thought content normal          Judgment: Judgment normal          Vital Signs  ED Triage Vitals [04/19/21 1123]   Temperature Pulse Respirations Blood Pressure SpO2   98 1 °F (36 7 °C) 68 20 135/92 95 %      Temp Source Heart Rate Source Patient Position - Orthostatic VS BP Location FiO2 (%)   Tympanic Monitor Sitting Right arm --      Pain Score       1           Vitals:    04/19/21 1123   BP: 135/92   Pulse: 68   Patient Position - Orthostatic VS: Sitting         Visual Acuity      ED Medications  Medications - No data to display    Diagnostic Studies  Results Reviewed     None                 No orders to display              Procedures  Procedures         ED Course                                           MDM  Number of Diagnoses or Management Options  Diagnosis management comments: Patient has no pain, no signs of venous distension or superficial venous thrombosis  Patient's symptoms most consistent with a contact dermatitis from likely Tegaderm  Patient states he will  hydrocortisone and does not want a prescription  Advised proper use of this  Patient otherwise is feeling very well without any other complaints at this point time       Patient is informed to return to the emergency department for worsening of symptoms and was given proper education regarding their diagnosis and symptoms  Otherwise the patient is informed to follow up with their primary care doctor for re-evaluation  The patient verbalizes understanding and agrees with above assessment and plan  All questions were answered  Please Note: Fluency Direct voice recognition software may have been used in the creation of this document  Wrong words or sound a like substitutions may have occurred due to the inherent limitations of the voice software  Amount and/or Complexity of Data Reviewed  Review and summarize past medical records: yes  Independent visualization of images, tracings, or specimens: yes        Disposition  Final diagnoses: Allergic contact dermatitis due to adhesives     Time reflects when diagnosis was documented in both MDM as applicable and the Disposition within this note     Time User Action Codes Description Comment    4/19/2021 11:57 AM Marcus Handley Add [L23 1] Allergic contact dermatitis due to adhesives       ED Disposition     ED Disposition Condition Date/Time Comment    Discharge Stable Mon Apr 19, 2021 11:56 AM Antonella Garcia discharge to home/self care  Follow-up Information     Follow up With Specialties Details Why Contact Info Additional Information    [facility] Emergency Department Emergency Medicine Go to  If symptoms worsen such as spreading redness, fevers, severe pain, difficulty bending or elbow, limb swelling, otherwise please follow up with your family doctor [address] [address] [address] [address] Emergency Department, [place], [place], [address]          Patient's Medications   Discharge Prescriptions    No medications on file     No discharge procedures on file      PDMP Review     None          ED Provider  Electronically Signed by           Dm Villela Sarabjit Adjutant, VERN  04/19/21 1159

## 2021-09-30 ENCOUNTER — TELEPHONE (OUTPATIENT)
Dept: CARDIOLOGY CLINIC | Age: 56
End: 2021-09-30

## 2021-09-30 NOTE — TELEPHONE ENCOUNTER
Patient is due to send a reading in on Pacemaker. Patient has not had device checked since 09/2020. I am unable to call the patient due to no working numbers in chart and patient's address appears as though the patient is homeless so I am unable to mail patient a reminder letter. Will keep attempting to find a way to reach patient.

## 2021-12-28 ENCOUNTER — HOSPITAL ENCOUNTER (EMERGENCY)
Age: 56
Discharge: HOME | End: 2021-12-28
Payer: MEDICARE

## 2021-12-28 VITALS
RESPIRATION RATE: 16 BRPM | DIASTOLIC BLOOD PRESSURE: 64 MMHG | TEMPERATURE: 98.2 F | WEIGHT: 160 LBS | HEIGHT: 64 IN | SYSTOLIC BLOOD PRESSURE: 106 MMHG | BODY MASS INDEX: 27.31 KG/M2 | OXYGEN SATURATION: 95 % | HEART RATE: 79 BPM

## 2021-12-28 DIAGNOSIS — Z71.1 CONCERN ABOUT STD IN FEMALE WITHOUT DIAGNOSIS: Primary | ICD-10-CM

## 2021-12-28 LAB
BACTERIA: ABNORMAL /HPF
BILIRUBIN URINE: NEGATIVE
BLOOD, URINE: ABNORMAL
CLARITY: ABNORMAL
COLOR: YELLOW
CRYSTALS, UA: ABNORMAL /HPF
EPITHELIAL CELLS, UA: 17 /HPF (ref 0–5)
GLUCOSE URINE: NEGATIVE MG/DL
HCG(URINE) PREGNANCY TEST: NEGATIVE
HYALINE CASTS: 7 /HPF (ref 0–8)
KETONES, URINE: NEGATIVE MG/DL
LEUKOCYTE ESTERASE, URINE: ABNORMAL
NITRITE, URINE: NEGATIVE
PH UA: 5 (ref 5–8)
PROTEIN UA: ABNORMAL MG/DL
RBC UA: 7 /HPF (ref 0–4)
SPECIFIC GRAVITY UA: 1.01 (ref 1–1.03)
UROBILINOGEN, URINE: 0.2 E.U./DL
WBC UA: 650 /HPF (ref 0–5)

## 2021-12-28 PROCEDURE — 81001 URINALYSIS AUTO W/SCOPE: CPT

## 2021-12-28 PROCEDURE — 87086 URINE CULTURE/COLONY COUNT: CPT

## 2021-12-28 PROCEDURE — 99282 EMERGENCY DEPT VISIT SF MDM: CPT

## 2021-12-28 PROCEDURE — 84703 CHORIONIC GONADOTROPIN ASSAY: CPT

## 2021-12-28 PROCEDURE — 2500000003 HC RX 250 WO HCPCS: Performed by: NURSE PRACTITIONER

## 2021-12-28 PROCEDURE — 96372 THER/PROPH/DIAG INJ SC/IM: CPT

## 2021-12-28 PROCEDURE — 6370000000 HC RX 637 (ALT 250 FOR IP): Performed by: NURSE PRACTITIONER

## 2021-12-28 PROCEDURE — 6360000002 HC RX W HCPCS: Performed by: NURSE PRACTITIONER

## 2021-12-28 RX ORDER — AZITHROMYCIN 250 MG/1
1000 TABLET, FILM COATED ORAL ONCE
Status: COMPLETED | OUTPATIENT
Start: 2021-12-28 | End: 2021-12-28

## 2021-12-28 RX ADMIN — LIDOCAINE HYDROCHLORIDE 250 MG: 10 INJECTION, SOLUTION EPIDURAL; INFILTRATION; INTRACAUDAL; PERINEURAL at 21:20

## 2021-12-28 RX ADMIN — AZITHROMYCIN MONOHYDRATE 1000 MG: 250 TABLET ORAL at 21:20

## 2021-12-28 ASSESSMENT — PAIN - FUNCTIONAL ASSESSMENT: PAIN_FUNCTIONAL_ASSESSMENT: 0-10

## 2021-12-28 ASSESSMENT — PAIN DESCRIPTION - FREQUENCY: FREQUENCY: CONTINUOUS

## 2021-12-28 ASSESSMENT — PAIN SCALES - GENERAL: PAINLEVEL_OUTOF10: 5

## 2021-12-28 ASSESSMENT — PAIN DESCRIPTION - DESCRIPTORS: DESCRIPTORS: BURNING

## 2021-12-28 ASSESSMENT — ENCOUNTER SYMPTOMS: VOMITING: 0

## 2021-12-28 ASSESSMENT — PAIN DESCRIPTION - LOCATION: LOCATION: LABIA
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
## 2021-12-30 LAB — URINE CULTURE, ROUTINE: NORMAL

## 2022-01-09 ENCOUNTER — HOSPITAL ENCOUNTER (EMERGENCY)
Age: 57
Discharge: HOME | End: 2022-01-09
Attending: EMERGENCY MEDICINE
Payer: MEDICARE

## 2022-01-09 VITALS
DIASTOLIC BLOOD PRESSURE: 89 MMHG | OXYGEN SATURATION: 98 % | HEIGHT: 64 IN | BODY MASS INDEX: 33.8 KG/M2 | SYSTOLIC BLOOD PRESSURE: 136 MMHG | HEART RATE: 92 BPM | TEMPERATURE: 98.5 F | RESPIRATION RATE: 20 BRPM | WEIGHT: 198 LBS

## 2022-01-09 DIAGNOSIS — T74.21XA SEXUAL ASSAULT OF ADULT, INITIAL ENCOUNTER: Primary | ICD-10-CM

## 2022-01-09 PROCEDURE — 99282 EMERGENCY DEPT VISIT SF MDM: CPT

## 2022-01-09 PROCEDURE — 99283 EMERGENCY DEPT VISIT LOW MDM: CPT

## 2022-01-09 PROCEDURE — 2720000011 HC SANE KIT SUPPLY STERILE

## 2022-01-09 ASSESSMENT — ENCOUNTER SYMPTOMS
CHOKING: 0
APNEA: 0
EYE DISCHARGE: 0
FACIAL SWELLING: 0
NAUSEA: 0
BLOOD IN STOOL: 0
DIARRHEA: 0
CONSTIPATION: 0
SINUS PRESSURE: 0
SORE THROAT: 0
SHORTNESS OF BREATH: 0
VOICE CHANGE: 0
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
## 2022-01-10 ENCOUNTER — TELEPHONE (OUTPATIENT)
Dept: CARDIOLOGY CLINIC | Age: 57
End: 2022-01-10

## 2022-01-10 NOTE — ED NOTES
Kit picked up by Susannah Dominguez at [address] # [id number]  Sealed at [address] [place] [address]  01/09/22 2000
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
## 2022-01-10 NOTE — ED NOTES
[facility]       Mj Ray, VIRIDIANA  01/09/22 8589
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
## 2022-02-22 ENCOUNTER — TELEPHONE (OUTPATIENT)
Dept: CARDIOLOGY CLINIC | Age: 57
End: 2022-02-22

## 2022-03-24 ENCOUNTER — TELEPHONE (OUTPATIENT)
Dept: CARDIOLOGY CLINIC | Age: 57
End: 2022-03-24

## 2022-04-19 ENCOUNTER — TELEPHONE (OUTPATIENT)
Dept: CARDIOLOGY CLINIC | Age: 57
End: 2022-04-19

## 2022-05-19 ENCOUNTER — OFFICE VISIT (OUTPATIENT)
Dept: CARDIOLOGY CLINIC | Age: 57
End: 2022-05-19
Payer: MEDICARE

## 2022-05-19 VITALS
SYSTOLIC BLOOD PRESSURE: 126 MMHG | WEIGHT: 166 LBS | HEIGHT: 64 IN | DIASTOLIC BLOOD PRESSURE: 82 MMHG | BODY MASS INDEX: 28.34 KG/M2 | HEART RATE: 97 BPM

## 2022-05-19 DIAGNOSIS — I47.20 VENTRICULAR TACHYARRHYTHMIA: Primary | ICD-10-CM

## 2022-05-19 DIAGNOSIS — Z95.0 PACEMAKER: ICD-10-CM

## 2022-05-19 DIAGNOSIS — I49.5 SINOATRIAL NODE DYSFUNCTION (HCC): ICD-10-CM

## 2022-05-19 PROCEDURE — 99202 OFFICE O/P NEW SF 15 MIN: CPT | Performed by: INTERNAL MEDICINE

## 2022-05-19 PROCEDURE — 93280 PM DEVICE PROGR EVAL DUAL: CPT | Performed by: INTERNAL MEDICINE

## 2022-05-19 NOTE — PATIENT INSTRUCTIONS
Please call [phone] Get connected at [phone] to discuss home monitor issues and assistance with sending a transmission.

## 2022-05-19 NOTE — PROGRESS NOTES
Pacemaker interrogated  Presenting rhythm:  AS VS rate 98, AP 1.6%,  <0.1%  Battey voltage 11.7 years  Lead status:  Lead impedance within range and stable  Sensing:  P waves 4 mV,  R waves >20 mV  Thresholds:  Atrial 0.75V @ 0.4ms, ventricular 0.5@ 0.4ms  Observations:  Episode #930 VT 16 beats on 4/26/22  Brief episode of SVT noted   Reprogramming for sensitivity and threshold testing  Next [facility] appointment:  8/19/22
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
## 2022-08-01 ENCOUNTER — HOSPITAL ENCOUNTER (OUTPATIENT)
Dept: NUCLEAR MEDICINE | Age: 57
Discharge: HOME | End: 2022-08-03
Payer: MEDICARE

## 2022-08-01 ENCOUNTER — HOSPITAL ENCOUNTER (OUTPATIENT)
Dept: NON INVASIVE DIAGNOSTICS | Age: 57
Discharge: HOME | End: 2022-08-01
Payer: MEDICARE

## 2022-08-01 DIAGNOSIS — I47.20 VENTRICULAR TACHYARRHYTHMIA: ICD-10-CM

## 2022-08-01 DIAGNOSIS — R06.02 SHORTNESS OF BREATH: ICD-10-CM

## 2022-08-01 LAB
LV EF: 63 %
LV EF: 67 %
LVEF MODALITY: NORMAL
LVEF MODALITY: NORMAL

## 2022-08-01 PROCEDURE — 93018 CV STRESS TEST I&R ONLY: CPT | Performed by: INTERNAL MEDICINE

## 2022-08-01 PROCEDURE — 78452 HT MUSCLE IMAGE SPECT MULT: CPT | Performed by: INTERNAL MEDICINE

## 2022-08-01 PROCEDURE — C8929 TTE W OR WO FOL WCON,DOPPLER: HCPCS

## 2022-08-01 PROCEDURE — 6360000004 HC RX CONTRAST MEDICATION: Performed by: INTERNAL MEDICINE

## 2022-08-01 PROCEDURE — 6360000002 HC RX W HCPCS: Performed by: INTERNAL MEDICINE

## 2022-08-01 PROCEDURE — 93017 CV STRESS TEST TRACING ONLY: CPT

## 2022-08-01 PROCEDURE — 93016 CV STRESS TEST SUPVJ ONLY: CPT | Performed by: INTERNAL MEDICINE

## 2022-08-01 PROCEDURE — A9502 TC99M TETROFOSMIN: HCPCS | Performed by: INTERNAL MEDICINE

## 2022-08-01 PROCEDURE — 3430000000 HC RX DIAGNOSTIC RADIOPHARMACEUTICAL: Performed by: INTERNAL MEDICINE

## 2022-08-01 RX ADMIN — TETROFOSMIN 8 MILLICURIE: 1.38 INJECTION, POWDER, LYOPHILIZED, FOR SOLUTION INTRAVENOUS at 11:05

## 2022-08-01 RX ADMIN — PERFLUTREN 1.5 ML: 6.52 INJECTION, SUSPENSION INTRAVENOUS at 10:49

## 2022-08-01 RX ADMIN — TETROFOSMIN 24 MILLICURIE: 1.38 INJECTION, POWDER, LYOPHILIZED, FOR SOLUTION INTRAVENOUS at 12:50

## 2022-08-01 RX ADMIN — REGADENOSON 0.4 MG: 0.08 INJECTION, SOLUTION INTRAVENOUS at 12:09

## 2022-08-03 ENCOUNTER — TELEPHONE (OUTPATIENT)
Dept: CARDIOLOGY CLINIC | Age: 57
End: 2022-08-03

## 2022-08-04 ENCOUNTER — OFFICE VISIT (OUTPATIENT)
Dept: CARDIOLOGY CLINIC | Age: 57
End: 2022-08-04
Payer: MEDICARE

## 2022-08-04 VITALS
DIASTOLIC BLOOD PRESSURE: 84 MMHG | HEIGHT: 64 IN | BODY MASS INDEX: 28.17 KG/M2 | HEART RATE: 89 BPM | OXYGEN SATURATION: 98 % | WEIGHT: 165 LBS | SYSTOLIC BLOOD PRESSURE: 122 MMHG

## 2022-08-04 DIAGNOSIS — Z95.0 PACEMAKER: ICD-10-CM

## 2022-08-04 DIAGNOSIS — I49.5 SINOATRIAL NODE DYSFUNCTION (HCC): Primary | ICD-10-CM

## 2022-08-04 DIAGNOSIS — I10 ESSENTIAL HYPERTENSION: Chronic | ICD-10-CM

## 2022-08-04 DIAGNOSIS — I47.20 VENTRICULAR TACHYARRHYTHMIA: ICD-10-CM

## 2022-08-04 PROCEDURE — 99214 OFFICE O/P EST MOD 30 MIN: CPT | Performed by: CLINICAL NURSE SPECIALIST

## 2022-08-04 PROCEDURE — 93280 PM DEVICE PROGR EVAL DUAL: CPT | Performed by: CLINICAL NURSE SPECIALIST

## 2022-08-04 RX ORDER — LITHIUM CARBONATE 300 MG/1
CAPSULE ORAL
COMMUNITY
Start: 2022-07-26

## 2022-08-04 ASSESSMENT — ENCOUNTER SYMPTOMS
VOMITING: 0
SHORTNESS OF BREATH: 0
FACIAL SWELLING: 0
CHEST TIGHTNESS: 0
WHEEZING: 0
EYE REDNESS: 0
ABDOMINAL PAIN: 0
COUGH: 0
NAUSEA: 0
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
## 2022-12-27 ENCOUNTER — TELEPHONE (OUTPATIENT)
Dept: CARDIOLOGY CLINIC | Age: 57
End: 2022-12-27

## 2023-02-02 ENCOUNTER — TELEPHONE (OUTPATIENT)
Dept: CARDIOLOGY CLINIC | Age: 58
End: 2023-02-02

## 2023-02-21 ENCOUNTER — OFFICE VISIT (OUTPATIENT)
Dept: CARDIOLOGY CLINIC | Age: 58
End: 2023-02-21
Payer: MEDICAID

## 2023-02-21 VITALS
HEIGHT: 64 IN | SYSTOLIC BLOOD PRESSURE: 108 MMHG | DIASTOLIC BLOOD PRESSURE: 62 MMHG | BODY MASS INDEX: 30.73 KG/M2 | WEIGHT: 180 LBS | HEART RATE: 94 BPM

## 2023-02-21 DIAGNOSIS — Z95.0 PACEMAKER: ICD-10-CM

## 2023-02-21 DIAGNOSIS — I47.20 VENTRICULAR TACHYARRHYTHMIA: ICD-10-CM

## 2023-02-21 DIAGNOSIS — I10 ESSENTIAL HYPERTENSION: ICD-10-CM

## 2023-02-21 DIAGNOSIS — I49.5 SINOATRIAL NODE DYSFUNCTION (HCC): Primary | ICD-10-CM

## 2023-02-21 PROCEDURE — 3078F DIAST BP <80 MM HG: CPT | Performed by: CLINICAL NURSE SPECIALIST

## 2023-02-21 PROCEDURE — 93280 PM DEVICE PROGR EVAL DUAL: CPT | Performed by: CLINICAL NURSE SPECIALIST

## 2023-02-21 PROCEDURE — 3074F SYST BP LT 130 MM HG: CPT | Performed by: CLINICAL NURSE SPECIALIST

## 2023-02-21 PROCEDURE — 99213 OFFICE O/P EST LOW 20 MIN: CPT | Performed by: CLINICAL NURSE SPECIALIST

## 2023-02-21 RX ORDER — LISINOPRIL AND HYDROCHLOROTHIAZIDE 25; 20 MG/1; MG/1
1 TABLET ORAL DAILY
COMMUNITY

## 2023-02-21 ASSESSMENT — ENCOUNTER SYMPTOMS
FACIAL SWELLING: 0
COUGH: 0
VOMITING: 0
SHORTNESS OF BREATH: 0
CHEST TIGHTNESS: 0
EYE REDNESS: 0
NAUSEA: 0
ABDOMINAL PAIN: 0
WHEEZING: 0

## 2023-02-21 NOTE — PROGRESS NOTES
[facility], [address], [address] [facility]  Phone: [phone]  Fax: [phone]    OFFICE VISIT:  2023    Terrymelony Beebe - : 1965    Reason For Visit:  Shirlene Cope is a 66 y.o. female who is here for 6 Month Follow-Up and Hypertension       Diagnosis Orders   1. Sinoatrial node dysfunction (HCC)        2. Pacemaker        3. Ventricular tachyarrhythmia        4. Essential hypertension                  HPI:   Patient follows with our office with a history of sinoatrial node dysfunction, pacemaker, hypertension, stroke, NSVT    She denies chest pain, unusual dyspnea, orthopnea, PND, edema, palpitations. JOHN Thacker is PCP.   Juan Antonio Beebe has the following history as recorded in [facility]:    Patient Active Problem List    Diagnosis Date Noted    Sinoatrial node dysfunction ([address] 2019    Cannabis use disorder, mild, abuse 2021    Alcohol use 2021    Tobacco use disorder 2021    Major depressive disorder, recurrent severe without psychotic features ([address] 2021    Pacemaker 2019    Sinus pause 2019    Adhesive capsulitis of left shoulder 2018    Mixed hyperlipidemia 2018    Weakness 2018    Memory loss 2018    Status post placement of implantable loop recorder 2018    Cryptogenic stroke [facility])     Cerebrovascular accident (CVA) determined by clinical assessment ([address]     Lesion of basal ganglia     Hemiplegia affecting non-dominant side, post-stroke 2018    Gait abnormality 2018    Dysarthria 2018    Cerebrovascular accident (CVA) ([address] 2018    Essential hypertension 2018     Past Medical History:   Diagnosis Date    Anxiety     Arthritis     Bipolar 1 disorder ([address]     Cryptogenic stroke ([address] 12/2017    X 2 WITH LOOP RECORDER PUT IN 2018    Depression     Ganglia     Hypertension     Memory loss 2019    Mixed hyperlipidemia 3/13/2018 Pacemaker 2019    Schizoid personality disorder ([facility]     Status post placement of implantable loop recorder 2018     Past Surgical History:   Procedure Laterality Date     SECTION      x2    CHOLECYSTECTOMY      COSMETIC SURGERY      HAND SURGERY      INSERTABLE CARDIAC MONITOR  2018    NE MNPJ W/ANES SHOULDER JT APPL FIXATION APPARATUS Left 12/3/2018    SHOULDER MANIPULATION UNDER ANESTHESIA WITH CORTICOSTEROID INJECTION performed by Mars Ascencio MD at [facility] History   Problem Relation Age of Onset    Mental Illness Mother     Diabetes Mother     Cancer Mother     Mental Illness Father     Stroke Father      Social History     Tobacco Use    Smoking status: Every Day     Packs/day: 0.25     Years: 35.00     Pack years: 8.75     Types: Cigarettes    Smokeless tobacco: Never   Substance Use Topics    Alcohol use: Yes     Comment: occ. Current Outpatient Medications   Medication Sig Dispense Refill    lisinopril-hydroCHLOROthiazide (PRINZIDE;ZESTORETIC) 20-25 MG per tablet Take 1 tablet by mouth daily      lithium 300 MG capsule       melatonin 5 MG TBDP disintegrating tablet Take 1 tablet by mouth nightly 30 tablet 1    vitamin B-12 500 MCG tablet Take 1 tablet by mouth daily 30 tablet 1    traZODone (DESYREL) 50 MG tablet Take 1 tablet by mouth nightly 30 tablet 1    vitamin D (ERGOCALCIFEROL) 1.25 MG (58431 UT) CAPS capsule Take 1 capsule by mouth once a week for 11 doses 11 capsule 1    pravastatin (PRAVACHOL) 40 MG tablet Take 1 tablet by mouth nightly 30 tablet 1    mirtazapine (REMERON) 7.5 MG tablet Take 1 tablet by mouth nightly 30 tablet 1    metoprolol tartrate (LOPRESSOR) 25 MG tablet Take 1 tablet by mouth 2 times daily 60 tablet 1     No current facility-administered medications for this visit.      Allergies: Latex and Penicillins    Review of Systems  Review of Systems   Constitutional:  Negative for activity change, diaphoresis, fatigue, fever and unexpected weight change. HENT:  Negative for facial swelling and nosebleeds. Eyes:  Negative for redness and visual disturbance. Respiratory:  Negative for cough, chest tightness, shortness of breath and wheezing. Cardiovascular:  Positive for leg swelling. Negative for chest pain and palpitations. Gastrointestinal:  Negative for abdominal pain, nausea and vomiting. Endocrine: Negative for cold intolerance and heat intolerance. Genitourinary:  Negative for dysuria and hematuria. Musculoskeletal:  Negative for arthralgias and myalgias. Skin:  Negative for pallor and rash. Neurological:  Negative for dizziness, seizures, syncope, weakness and light-headedness. Hematological:  Does not bruise/bleed easily. Psychiatric/Behavioral:  Negative for agitation. The patient is not nervous/anxious. Objective  Vital Signs - /62   Pulse 94   Ht 5' 4\" (1.626 m)   Wt 180 lb (81.6 kg)   LMP  (LMP Unknown)   BMI 30.90 kg/m²   Physical Exam  Vitals and nursing note reviewed. Constitutional:       General: She is not in acute distress. Appearance: She is well-developed. She is obese. She is not diaphoretic. HENT:      Head: Normocephalic and atraumatic. Right Ear: Hearing and external ear normal.      Left Ear: Hearing and external ear normal.      Nose: Nose normal.   Eyes:      General:         Right eye: No discharge. Left eye: No discharge. Pupils: Pupils are equal, round, and reactive to light. Neck:      Thyroid: No thyromegaly. Vascular: No carotid bruit or JVD. Trachea: No tracheal deviation. Cardiovascular:      Rate and Rhythm: Normal rate and regular rhythm. Heart sounds: Normal heart sounds. No murmur heard. No friction rub. No gallop. Pulmonary:      Effort: Pulmonary effort is normal. No respiratory distress. Breath sounds: Normal breath sounds. No wheezing or rales. Abdominal:      Palpations: Abdomen is soft. Tenderness:  There is no abdominal tenderness. Musculoskeletal:         General: No swelling or deformity. Cervical back: Neck supple. No muscular tenderness. Right lower leg: No edema. Left lower leg: No edema. Comments: Ambulates with cane   Skin:     General: Skin is warm and dry. Findings: No rash. Neurological:      General: No focal deficit present. Mental Status: She is alert and oriented to person, place, and time. Cranial Nerves: No cranial nerve deficit. Psychiatric:         Mood and Affect: Mood normal.         Behavior: Behavior normal.         Judgment: Judgment normal.   Is a Mr. Gilford Kleine that I did not [employer]    Data:  Lab Results   Component Value Date/Time    WBC 7.0 02/16/2021 10:00 PM    RBC 4.39 02/16/2021 10:00 PM    HGB 14.2 02/16/2021 10:00 PM    HCT 43.3 02/16/2021 10:00 PM     02/16/2021 10:00 PM      Lab Results   Component Value Date/Time    CHOL 185 02/18/2021 07:42 AM    TRIG 122 02/18/2021 07:42 AM    HDL 42 02/18/2021 07:42 AM    LDLCALC 119 02/18/2021 07:42 AM     Lab Results   Component Value Date/Time     02/16/2021 10:00 PM    K 3.7 02/16/2021 10:00 PM    K 3.9 01/24/2019 11:44 AM     02/16/2021 10:00 PM    CO2 26 02/16/2021 10:00 PM    GLUCOSE 96 02/16/2021 10:00 PM    BUN 11 02/16/2021 10:00 PM    CREATININE 0.7 02/16/2021 10:00 PM    CALCIUM 9.5 02/16/2021 10:00 PM    ALT 13 02/16/2021 10:00 PM    AST 16 02/16/2021 10:00 PM     Lab Results   Component Value Date/Time    TSH 2.160 07/30/2019 11:09 AM       Assessment:     Diagnosis Orders   1. Sinoatrial node dysfunction (HCC)        2. Pacemaker        3. Ventricular tachyarrhythmia        4. Essential hypertension              Sinoatrial node dysfunction/pacemaker  Pacemaker check showed adequate battery status @11. years estimated. Mode: AAIR-DDDR. Lead impedances are stable  Pacing: AP 0.5%,  0.1%. Appropriate diagnostics and safety margins noted.   Sustained arrythmia: one 2 second NSVT, brief SVT  Reprogramming done for sensitivity and threshold testing. Please see the scanned interrogation report    Hypertension-stable on current therapies with metoprolol and lisinopril hctz    Ventricular tachyarrhythmia-history of-brief 2-second episode on pacemaker check today. Patient was asymptomatic    Stable cardiovascular status. No evidence of overt heart failure,angina or dysrhythmia. Plan    Return in about 6 months (around 8/21/2023) for JOHN. [facility]link in [place] 05/22/23    Call with any questionsor concerns  Follow up with JOHN Noriega for non cardiac problems  Report any new problems  Cardiovascular Fitness-Exercise as tolerated. Strive for 15 minutes of exercise most days of the week. Cardiac / HealthyDiet  Continue current medications as directed  Continue plan of treatment  It is always recommended that you bring your medicationsbottles with you to each visit - this is for your safety!        Gunnar Spatz, APRN

## 2023-02-21 NOTE — PATIENT INSTRUCTIONS
Return in about 6 months (around 8/21/2023) for APRN.   [facility] in [facility] 05/22/23

## 2023-05-30 ENCOUNTER — HOSPITAL ENCOUNTER (OUTPATIENT)
Dept: WOMENS IMAGING | Age: 58
Discharge: HOME | End: 2023-05-30
Payer: MEDICARE

## 2023-05-30 DIAGNOSIS — Z12.31 ENCOUNTER FOR SCREENING MAMMOGRAM FOR MALIGNANT NEOPLASM OF BREAST: ICD-10-CM

## 2023-05-30 PROCEDURE — 77067 SCR MAMMO BI INCL CAD: CPT | Performed by: GENERAL PRACTICE

## 2023-05-30 PROCEDURE — 77063 BREAST TOMOSYNTHESIS BI: CPT

## 2023-06-27 ENCOUNTER — TELEPHONE (OUTPATIENT)
Dept: CARDIOLOGY CLINIC | Age: 58
End: 2023-06-27

## 2023-07-24 ENCOUNTER — TELEPHONE (OUTPATIENT)
Dept: CARDIOLOGY CLINIC | Age: 58
End: 2023-07-24

## 2023-08-04 ENCOUNTER — TELEPHONE (OUTPATIENT)
Dept: CARDIOLOGY CLINIC | Age: 58
End: 2023-08-04

## 2023-08-24 ENCOUNTER — OFFICE VISIT (OUTPATIENT)
Dept: CARDIOLOGY CLINIC | Age: 58
End: 2023-08-24
Payer: MEDICARE

## 2023-08-24 VITALS
BODY MASS INDEX: 29.71 KG/M2 | WEIGHT: 174 LBS | SYSTOLIC BLOOD PRESSURE: 130 MMHG | HEIGHT: 64 IN | HEART RATE: 81 BPM | DIASTOLIC BLOOD PRESSURE: 88 MMHG

## 2023-08-24 DIAGNOSIS — I49.5 SINOATRIAL NODE DYSFUNCTION (HCC): Primary | ICD-10-CM

## 2023-08-24 DIAGNOSIS — I47.20 VENTRICULAR TACHYARRHYTHMIA (HCC): ICD-10-CM

## 2023-08-24 DIAGNOSIS — I10 ESSENTIAL HYPERTENSION: ICD-10-CM

## 2023-08-24 DIAGNOSIS — Z95.0 PACEMAKER: ICD-10-CM

## 2023-08-24 PROCEDURE — 4004F PT TOBACCO SCREEN RCVD TLK: CPT | Performed by: CLINICAL NURSE SPECIALIST

## 2023-08-24 PROCEDURE — 3075F SYST BP GE 130 - 139MM HG: CPT | Performed by: CLINICAL NURSE SPECIALIST

## 2023-08-24 PROCEDURE — 93280 PM DEVICE PROGR EVAL DUAL: CPT | Performed by: CLINICAL NURSE SPECIALIST

## 2023-08-24 PROCEDURE — G8427 DOCREV CUR MEDS BY ELIG CLIN: HCPCS | Performed by: CLINICAL NURSE SPECIALIST

## 2023-08-24 PROCEDURE — G8417 CALC BMI ABV UP PARAM F/U: HCPCS | Performed by: CLINICAL NURSE SPECIALIST

## 2023-08-24 PROCEDURE — 99213 OFFICE O/P EST LOW 20 MIN: CPT | Performed by: CLINICAL NURSE SPECIALIST

## 2023-08-24 PROCEDURE — 3079F DIAST BP 80-89 MM HG: CPT | Performed by: CLINICAL NURSE SPECIALIST

## 2023-08-24 PROCEDURE — 3017F COLORECTAL CA SCREEN DOC REV: CPT | Performed by: CLINICAL NURSE SPECIALIST

## 2023-08-24 ASSESSMENT — ENCOUNTER SYMPTOMS
FACIAL SWELLING: 0
COUGH: 0
NAUSEA: 0
ABDOMINAL PAIN: 0
SHORTNESS OF BREATH: 0
WHEEZING: 0
CHEST TIGHTNESS: 0
EYE REDNESS: 0
VOMITING: 0

## 2023-08-24 NOTE — PROGRESS NOTES
distress. Breath sounds: Normal breath sounds. No wheezing or rales. Abdominal:      Palpations: Abdomen is soft. Tenderness: There is no abdominal tenderness. Musculoskeletal:         General: No swelling or deformity. Cervical back: Neck supple. No muscular tenderness. Right lower leg: No edema. Left lower leg: No edema. Comments: Ambulates with cane   Skin:     General: Skin is warm and dry. Findings: No rash. Neurological:      General: No focal deficit present. Mental Status: She is alert and oriented to person, place, and time. Cranial Nerves: No cranial nerve deficit. Psychiatric:         Mood and Affect: Mood normal.         Behavior: Behavior normal.         Judgment: Judgment normal.       Data:  Lab Results   Component Value Date/Time    WBC 7.0 02/16/2021 10:00 PM    RBC 4.39 02/16/2021 10:00 PM    HGB 14.2 02/16/2021 10:00 PM    HCT 43.3 02/16/2021 10:00 PM     02/16/2021 10:00 PM      Lab Results   Component Value Date/Time    CHOL 185 02/18/2021 07:42 AM    TRIG 122 02/18/2021 07:42 AM    HDL 42 02/18/2021 07:42 AM    LDLCALC 119 02/18/2021 07:42 AM     Lab Results   Component Value Date/Time     02/16/2021 10:00 PM    K 3.7 02/16/2021 10:00 PM    K 3.9 01/24/2019 11:44 AM     02/16/2021 10:00 PM    CO2 26 02/16/2021 10:00 PM    GLUCOSE 96 02/16/2021 10:00 PM    BUN 11 02/16/2021 10:00 PM    CREATININE 0.7 02/16/2021 10:00 PM    CALCIUM 9.5 02/16/2021 10:00 PM    ALT 13 02/16/2021 10:00 PM    AST 16 02/16/2021 10:00 PM     Lab Results   Component Value Date/Time    TSH 2.160 07/30/2019 11:09 AM       Assessment:     Diagnosis Orders   1. Sinoatrial node dysfunction (HCC)        2. Pacemaker        3. Essential hypertension        4. Ventricular tachyarrhythmia (HCC)                Sinoatrial node dysfunction/pacemaker  Pacemaker check showed adequate battery status @10.5 years estimated. Mode: AAIR-DDDR.   Lead impedances are

## 2023-08-24 NOTE — PATIENT INSTRUCTIONS
Return in about 6 months (around 2/24/2024) for JOHN. Carelink in 3mo 11/24/23    Call with any questionsor concerns  Follow up with Marylee Job, APRN for non cardiac problems  Report any new problems  Cardiovascular Fitness-Exercise as tolerated. Strive for 15 minutes of exercise most days of the week. Cardiac / HealthyDiet  Continue current medications as directed  Continue plan of treatment  It is always recommended that you bring your medicationsbottles with you to each visit - this is for your safety!

## 2023-11-06 NOTE — PROGRESS NOTES
I have placed a referral for you to see dermatology. There are many dermatologists in the area. You can call to schedule your own appointment. Please let us know if you have trouble getting this scheduled.     [facility] [phone]  in [place] for acne . Wait for them to call you by next week    DOVE soap is a nice alternative for cleansing skin    Brush teeth twice day, dentist if you can  Go to the bathroom before you go to bed        Patient:   Sandie Saldivar  MR#:    [id number]   Room:    12/812-01   YOB: 1965  Date of Progress Note: 1/25/2018  Time of Note                           8:45 AM  Consulting Physician:   Sherly Hughes M.D. Attending Physician:  Sherly Hughes MD     Chief complaint stroke/abnormal brain scan    S:This 50 y.o. female  who presented to [facility] ER on 1/1/8 with c/o left sided weakness & facial drooping,right-sided numbness,dizziness, dysarthria that had started about 1 week prior. CT of head done in ER revealed right frontal lobe hypodensities are most consistent with  nonhemorrhagic subacute ischemia of the right middle cerebral artery distribution. She was admitted for further treatment with consult for neurology. MRI of brain done revealed seeral areas of small acute inarction in the right hemisphere,as well as a 17x42 mm enhancing lesion on the right basal ganglia. She was seen by Neurologist Dr. Nses Navarro who felt the infarcts were consistent with embolic process. Cardiology was consulted for evaluation WAQAR and loop recorder. She was seen by Dr. Prince Kurtz and taken for WAQAR and loop recorder placement on 1/4/18. She tolerated the procedure well. Neurosurgery was consulted to evaluate lesion. She was seen by Dr. Cheri Vazquez. Dr. Shaw Session believed that the findings of the MRI are consistent with an ischemic event followed by a small amount of hemorrhage that is resulting in cerebral edema, however, the possibility of an underlying mass is not entirely excluded. He will follow her very closely to watch the dynamics of the mass. He felt that a biopsy of a deep cerebral lesion that could possibly be a stroke poses more risk than benefit at this time and recommended repeat MRI brain in 4-6 weeks with an outpatient follow up. She continues to have left sided weakness,dysarthria and dysphasia. She was evaluated by SPT therapy and is currently on mechanical soft diet with nectar thick liquids.  She is participating in PT/OT as well. She currently lives with her  at [facility]. She is felt to need a stay on Rehab for continue PT/OT/SPT before for she will be ready to return ther with her . She is now felt ready to start the Rehab program. No new issues overnight.         REVIEW OF SYSTEMS:  Constitutional: No fevers No chills  Neck:No stiffness  Respiratory: No shortness of breath  Cardiovascular: No chest pain No palpitations  Gastrointestinal: No abdominal pain    Genitourinary: No Dysuria  Neurological: No headache, no confusion    Past Medical History:      Diagnosis Date    Anxiety     [facility] ([facility]     Depression     Hypertension     Status post placement of implantable loop recorder 2018       Past Surgical History:      Procedure Laterality Date     SECTION      x2    CHOLECYSTECTOMY      COSMETIC SURGERY      HAND SURGERY         Medications in Hospital:      Current Facility-Administered Medications:     aluminum & magnesium hydroxide-simethicone (MAALOX) 200-200-20 MG/5ML suspension 30 mL, 30 mL, Oral, Q6H PRN, Lona Piedra MD, 30 mL at 18 1458    acetaminophen (TYLENOL) tablet 650 mg, 650 mg, Oral, Q4H PRN, Solomon Mancia DO    magnesium hydroxide (MILK OF MAGNESIA) 400 MG/5ML suspension 30 mL, 30 mL, Oral, Daily PRN, Solomon Mancia DO, 30 mL at 18 2058    aspirin EC tablet 81 mg, 81 mg, Oral, Daily, Solomon Mancia DO, 81 mg at 18 0750    FLUoxetine (PROZAC) capsule 20 mg, 20 mg, Oral, Daily, Solomon Mancia DO, 20 mg at 18 0749    fluticasone (FLONASE) 50 MCG/ACT nasal spray 1 spray, 1 spray, Nasal, Daily, Solomon Mancia DO, 1 spray at 18 0749    gabapentin (NEURONTIN) capsule 300 mg, 300 mg, Oral, TID, Solomon Mancia DO, 300 mg at 18 0750    hydrOXYzine (VISTARIL) capsule 25 mg, 25 mg, Oral, TID PRN, Solomon Mancia DO    OLANZapine (ZYPREXA) tablet 5 mg, 5 mg, Oral, Nightly, Scottemerson Carrascoeo, DO, 5 mg at 18    pravastatin (PRAVACHOL) tablet 40 mg, 40 mg, Oral, Nightly, Andi Cast, DO, 40 mg at 18    docusate sodium (COLACE) capsule 100 mg, 100 mg, Oral, BID PRN, Burnell Apgar, MD, 100 mg at 18    bisacodyl (DULCOLAX) suppository 10 mg, 10 mg, Rectal, Daily PRN, Burnell Apgar, MD    enoxaparin (LOVENOX) injection 40 mg, 40 mg, Subcutaneous, Nightly, Burnell Apgar, MD, 40 mg at 18    Allergies:  Penicillins    Social History:   TOBACCO:   reports that she has been smoking. She has been smoking about 0.50 packs per day. She has never used smokeless tobacco.  ETOH:   reports that she drinks alcohol. Family History:       Problem Relation Age of Onset    Mental Illness Mother     Diabetes Mother     Cancer Mother     Mental Illness Father          PHYSICAL EXAM:  /75   Pulse 81   Temp 97.1 °F (36.2 °C) (Temporal)   Resp 16   Ht 5' 2\" (1.575 m)   Wt 198 lb 0.2 oz (89.8 kg)   SpO2 93%   BMI 36.22 kg/m²     Constitutional: she appears well-developed and well-nourished. Eyes - conjunctiva normal.    Ear, nose, throat - No scars, masses, or lesions over external nose or ears, no atrophy of tongue  Neck-symmetric, no masses noted, no jugular vein distension  Respiration- chest wall appears symmetric, good expansion,   normal effort without use of accessory muscles  Musculoskeletal - no significant wasting of muscles noted, no bony deformities Extremities-no clubbing, cyanosis or edema  Skin - warm, dry, and intact. No rash, erythema, or pallor.   Psychiatric - mood, affect, and behavior appear normal.      Neurology  NEUROLOGICAL EXAM:      Mental status   Awake, alert, fluent oriented x 3 appropriate affect  Attention and concentration appear appropriate  Recent and remote memory appears unremarkable  Speech normal without dysarthria  No clear issues with language       Cranial Nerves   CN III, IV,VI-EOMI, No nystagmus, conjugate eye movements, no ptosis    CN VII-left facial droop           Motor function  Antigravity x 4 drift left     Sensory function      Cerebellar      Tremor None present     Gait                  Not tested           Nursing/pcp notes, imaging,labs and vitals reviewed. PT,OT and/or speech notes reviewed    Lab Results   Component Value Date    WBC 6.2 01/25/2018    HGB 12.1 01/25/2018    HCT 36.9 (L) 01/25/2018    MCV 99.2 (H) 01/25/2018     01/25/2018     Lab Results   Component Value Date     01/06/2018    K 3.8 01/06/2018     01/06/2018    CO2 28 01/06/2018    BUN 12 01/06/2018    CREATININE 0.7 01/06/2018    GLUCOSE 97 01/06/2018    CALCIUM 9.1 01/06/2018    PROT 6.7 01/06/2018    LABALBU 3.7 01/06/2018    BILITOT <0.2 01/06/2018    ALKPHOS 63 01/06/2018    AST 16 01/06/2018    ALT 19 01/06/2018    LABGLOM >60 01/06/2018    GLOB 3.0 11/12/2016     Lab Results   Component Value Date    INR 1.03 01/01/2018    PROTIME 13.4 01/01/2018             RECORD REVIEW: Previous medical records, medications were reviewed at today's visit    IMPRESSION:   1. Stroke-on aspirin/statin-cardioembolic status post loop recorder/WAQAR  2. Abnormal brain scan- repeat MRI of the brain in 4-6 weeks  3. Mood disorder-on medications continue monitor  4. DVT prophylaxis-on Lovenox  5. Allergies Flonase  6. Hyperlipidemia-on statin  7.  PT/OT/speech    Continue medical care  LIBBY cooper 1 week        [address] [place]  Dr Lona Piedra

## 2023-12-01 ENCOUNTER — TELEPHONE (OUTPATIENT)
Dept: CARDIOLOGY CLINIC | Age: 58
End: 2023-12-01

## 2024-01-09 ENCOUNTER — TELEPHONE (OUTPATIENT)
Dept: CARDIOLOGY CLINIC | Age: 59
End: 2024-01-09

## 2024-01-09 NOTE — TELEPHONE ENCOUNTER
I spoke with the patient regarding their [facility] report that is due to be sent in. They verbalized an understanding and will be sending a report in as soon as they are able.

## 2024-02-23 ENCOUNTER — TELEPHONE (OUTPATIENT)
Dept: CARDIOLOGY CLINIC | Age: 59
End: 2024-02-23

## 2024-02-27 ENCOUNTER — OFFICE VISIT (OUTPATIENT)
Dept: CARDIOLOGY CLINIC | Age: 59
End: 2024-02-27
Payer: MEDICARE

## 2024-02-27 VITALS
SYSTOLIC BLOOD PRESSURE: 116 MMHG | HEIGHT: 64 IN | WEIGHT: 171 LBS | DIASTOLIC BLOOD PRESSURE: 84 MMHG | BODY MASS INDEX: 29.19 KG/M2 | HEART RATE: 88 BPM

## 2024-02-27 DIAGNOSIS — I10 ESSENTIAL HYPERTENSION: ICD-10-CM

## 2024-02-27 DIAGNOSIS — E78.2 MIXED HYPERLIPIDEMIA: ICD-10-CM

## 2024-02-27 DIAGNOSIS — Z95.0 PACEMAKER: ICD-10-CM

## 2024-02-27 DIAGNOSIS — F17.218 CIGARETTE NICOTINE DEPENDENCE WITH OTHER NICOTINE-INDUCED DISORDER: ICD-10-CM

## 2024-02-27 DIAGNOSIS — I49.5 SINOATRIAL NODE DYSFUNCTION (HCC): Primary | ICD-10-CM

## 2024-02-27 DIAGNOSIS — I47.20 VENTRICULAR TACHYARRHYTHMIA (HCC): ICD-10-CM

## 2024-02-27 DIAGNOSIS — Z86.73 HISTORY OF CVA (CEREBROVASCULAR ACCIDENT): ICD-10-CM

## 2024-02-27 PROCEDURE — G8484 FLU IMMUNIZE NO ADMIN: HCPCS | Performed by: CLINICAL NURSE SPECIALIST

## 2024-02-27 PROCEDURE — 3017F COLORECTAL CA SCREEN DOC REV: CPT | Performed by: CLINICAL NURSE SPECIALIST

## 2024-02-27 PROCEDURE — 99214 OFFICE O/P EST MOD 30 MIN: CPT | Performed by: CLINICAL NURSE SPECIALIST

## 2024-02-27 PROCEDURE — G8427 DOCREV CUR MEDS BY ELIG CLIN: HCPCS | Performed by: CLINICAL NURSE SPECIALIST

## 2024-02-27 PROCEDURE — 93280 PM DEVICE PROGR EVAL DUAL: CPT | Performed by: CLINICAL NURSE SPECIALIST

## 2024-02-27 PROCEDURE — G8417 CALC BMI ABV UP PARAM F/U: HCPCS | Performed by: CLINICAL NURSE SPECIALIST

## 2024-02-27 PROCEDURE — 3079F DIAST BP 80-89 MM HG: CPT | Performed by: CLINICAL NURSE SPECIALIST

## 2024-02-27 PROCEDURE — 99406 BEHAV CHNG SMOKING 3-10 MIN: CPT | Performed by: CLINICAL NURSE SPECIALIST

## 2024-02-27 PROCEDURE — 3074F SYST BP LT 130 MM HG: CPT | Performed by: CLINICAL NURSE SPECIALIST

## 2024-02-27 PROCEDURE — 4004F PT TOBACCO SCREEN RCVD TLK: CPT | Performed by: CLINICAL NURSE SPECIALIST

## 2024-02-27 ASSESSMENT — ENCOUNTER SYMPTOMS
SHORTNESS OF BREATH: 0
VOMITING: 0
FACIAL SWELLING: 0
CHEST TIGHTNESS: 0
EYE REDNESS: 0
COUGH: 0
WHEEZING: 0
ABDOMINAL PAIN: 0
NAUSEA: 0

## 2024-02-27 NOTE — PROGRESS NOTES
[facility], [facility]  [address] [address]  Phone: [phone]  Fax: [phone]    OFFICE VISIT:  2024    Nitza Zamora - : 1965    Reason For Visit:  Nitza is a 59 y.o. female who is here for Follow-up       Diagnosis Orders   1. Sinoatrial node dysfunction (HCC)        2. Pacemaker        3. Essential hypertension        4. Ventricular tachyarrhythmia (HCC)        5. History of CVA (cerebrovascular accident)        6. Mixed hyperlipidemia        7. Cigarette nicotine dependence with other nicotine-induced disorder                    HPI:   Patient follows with our office with a history of sinoatrial node dysfunction, pacemaker, hypertension, history of stroke, NSVT, history of CVA    She denies chest pain, unusual dyspnea, orthopnea, PND, edema, palpitations.     She has some mobility issues with a history of CVA and ambulates with a cane    She is working on smoking cessation and is down to 2 cigarettes every other day    John Gutierrez APRN is PCP.  Nitza Zamora has the following history as recorded in [facility]:    Patient Active Problem List    Diagnosis Date Noted    Sinoatrial node dysfunction (HCC) 2019    History of CVA (cerebrovascular accident) 2024    Cannabis use disorder, mild, abuse 2021    Alcohol use 2021    Tobacco use disorder 2021    Major depressive disorder, recurrent severe without psychotic features (HCC) 2021    Pacemaker 2019    Sinus pause 2019    Adhesive capsulitis of left shoulder 2018    Mixed hyperlipidemia 2018    Weakness 2018    Memory loss 2018    Status post placement of implantable loop recorder 2018    Cryptogenic stroke (HCC)     Lesion of basal ganglia     Hemiplegia affecting non-dominant side, post-stroke 2018    Gait abnormality 2018    Dysarthria 2018    Cerebrovascular accident (CVA) (HCC) 2018

## 2024-02-27 NOTE — PATIENT INSTRUCTIONS
[facility]link in [facility]   Quit smoking.  Call the [place] Tobacco Quit Line [phone]-QUIT-NOW

## 2024-05-30 ENCOUNTER — TELEPHONE (OUTPATIENT)
Dept: CARDIOLOGY CLINIC | Age: 59
End: 2024-05-30

## 2024-06-18 ENCOUNTER — TELEPHONE (OUTPATIENT)
Dept: CARDIOLOGY CLINIC | Age: 59
End: 2024-06-18

## 2024-07-31 ENCOUNTER — TELEPHONE (OUTPATIENT)
Dept: CARDIOLOGY CLINIC | Age: 59
End: 2024-07-31

## 2024-09-05 ENCOUNTER — TELEPHONE (OUTPATIENT)
Dept: CARDIOLOGY CLINIC | Age: 59
End: 2024-09-05

## 2024-09-10 ENCOUNTER — OFFICE VISIT (OUTPATIENT)
Dept: CARDIOLOGY CLINIC | Age: 59
End: 2024-09-10

## 2024-09-10 VITALS
DIASTOLIC BLOOD PRESSURE: 78 MMHG | WEIGHT: 175 LBS | SYSTOLIC BLOOD PRESSURE: 126 MMHG | HEIGHT: 64 IN | HEART RATE: 88 BPM | BODY MASS INDEX: 29.88 KG/M2

## 2024-09-10 DIAGNOSIS — Z86.73 HISTORY OF CVA (CEREBROVASCULAR ACCIDENT): ICD-10-CM

## 2024-09-10 DIAGNOSIS — I10 ESSENTIAL HYPERTENSION: ICD-10-CM

## 2024-09-10 DIAGNOSIS — F17.218 CIGARETTE NICOTINE DEPENDENCE WITH OTHER NICOTINE-INDUCED DISORDER: ICD-10-CM

## 2024-09-10 DIAGNOSIS — I49.5 SINOATRIAL NODE DYSFUNCTION (HCC): Primary | ICD-10-CM

## 2024-09-10 DIAGNOSIS — I47.20 VENTRICULAR TACHYARRHYTHMIA (HCC): ICD-10-CM

## 2024-09-10 DIAGNOSIS — Z95.0 PACEMAKER: ICD-10-CM

## 2024-09-10 DIAGNOSIS — E78.2 MIXED HYPERLIPIDEMIA: ICD-10-CM

## 2024-09-10 ASSESSMENT — ENCOUNTER SYMPTOMS
ABDOMINAL PAIN: 0
VOMITING: 0
COUGH: 0
CHEST TIGHTNESS: 0
EYE REDNESS: 0
FACIAL SWELLING: 0
WHEEZING: 0
SHORTNESS OF BREATH: 0
NAUSEA: 0

## 2024-10-02 ENCOUNTER — TELEPHONE (OUTPATIENT)
Dept: CARDIOLOGY CLINIC | Age: 59
End: 2024-10-02

## 2024-10-02 NOTE — TELEPHONE ENCOUNTER
Left message for patient to return call.  Her carelink monitor is not communicating and has not for awhile.  She should have a relay monitor.  Will need to see if we can assist patient in getting it working.

## 2024-12-13 ENCOUNTER — TELEPHONE (OUTPATIENT)
Dept: CARDIOLOGY CLINIC | Age: 59
End: 2024-12-13

## 2024-12-13 NOTE — TELEPHONE ENCOUNTER
I spoke with the patient's daughter regarding their [facility] report that is due to be sent in. They verbalized an understanding and will be asking her to send a report in as soon as they are able.

## 2025-01-28 ENCOUNTER — TELEPHONE (OUTPATIENT)
Dept: CARDIOLOGY CLINIC | Age: 60
End: 2025-01-28

## 2025-03-12 ENCOUNTER — OFFICE VISIT (OUTPATIENT)
Dept: CARDIOLOGY CLINIC | Age: 60
End: 2025-03-12

## 2025-03-12 VITALS
HEIGHT: 64 IN | WEIGHT: 175 LBS | HEART RATE: 86 BPM | DIASTOLIC BLOOD PRESSURE: 74 MMHG | SYSTOLIC BLOOD PRESSURE: 132 MMHG | BODY MASS INDEX: 29.88 KG/M2

## 2025-03-12 DIAGNOSIS — I10 ESSENTIAL HYPERTENSION: ICD-10-CM

## 2025-03-12 DIAGNOSIS — Z86.73 HISTORY OF CVA (CEREBROVASCULAR ACCIDENT): ICD-10-CM

## 2025-03-12 DIAGNOSIS — F17.218 CIGARETTE NICOTINE DEPENDENCE WITH OTHER NICOTINE-INDUCED DISORDER: ICD-10-CM

## 2025-03-12 DIAGNOSIS — I47.20 VENTRICULAR TACHYARRHYTHMIA (HCC): ICD-10-CM

## 2025-03-12 DIAGNOSIS — I49.5 SINOATRIAL NODE DYSFUNCTION (HCC): Primary | ICD-10-CM

## 2025-03-12 DIAGNOSIS — E78.2 MIXED HYPERLIPIDEMIA: ICD-10-CM

## 2025-03-12 DIAGNOSIS — Z95.0 PACEMAKER: ICD-10-CM

## 2025-03-12 ASSESSMENT — ENCOUNTER SYMPTOMS
SHORTNESS OF BREATH: 0
CHEST TIGHTNESS: 0
VOMITING: 0
WHEEZING: 0
ABDOMINAL PAIN: 0
NAUSEA: 0
COUGH: 0
EYE REDNESS: 0
FACIAL SWELLING: 0

## 2025-03-12 NOTE — PROGRESS NOTES
[facility], [facility]  [address] [address], [place] [address]  Phone: [phone]  Fax: [phone]    OFFICE VISIT:  3/12/2025    Nitza Zamora - : 1965    Reason For Visit:  Nitza is a 60 y.o. female who is here for 6 Month Follow-Up and Hypertension       Diagnosis Orders   1. Sinoatrial node dysfunction (HCC)        2. Pacemaker        3. Essential hypertension        4. Ventricular tachyarrhythmia (HCC)        5. Mixed hyperlipidemia        6. History of CVA (cerebrovascular accident)        7. Cigarette nicotine dependence with other nicotine-induced disorder                    HPI:   Patient follows with our office with a history of sinoatrial node dysfunction, pacemaker, hypertension, history of stroke, NSVT, history of CVA    She denies chest pain, unusual dyspnea, orthopnea, PND, edema, palpitations.     She has some mobility issues with a history of CVA and ambulates with a cane.  Her  has passed away since last visit here and she has been under some stress      John Gutierrez APRN is PCP.  Nitza Zamora has the following history as recorded in [facility]:    Patient Active Problem List    Diagnosis Date Noted    Sinoatrial node dysfunction (HCC) 2019    History of CVA (cerebrovascular accident) 2024    Cannabis use disorder, mild, abuse 2021    Alcohol use 2021    Tobacco use disorder 2021    Major depressive disorder, recurrent severe without psychotic features (HCC) 2021    Pacemaker 2019    Sinus pause 2019    Adhesive capsulitis of left shoulder 2018    Mixed hyperlipidemia 2018    Weakness 2018    Memory loss 2018    Status post placement of implantable loop recorder 2018    Cryptogenic stroke (HCC)     Lesion of basal ganglia     Hemiplegia affecting non-dominant side, post-stroke 2018    Gait abnormality 2018    Dysarthria 2018    Cerebrovascular  Attending Only

## 2025-03-12 NOTE — PATIENT INSTRUCTIONS
Return in about 6 months (around 9/12/2025) for JAKI Lawrence in 3mo   Quit smoking.  Call the [place] Tobacco Quit Line [phone]

## 2025-03-20 NOTE — GROUP NOTE
Dr. Membreno is aware of elevated BPs during neph tube recovery. Patient will inform his home health RN at tomorrows visit and instructed him to follow up with primary care physician.    Group Therapy Note    Date: 4/25/2020    Group Start Time: 0830  Group End Time: 0930  Group Topic: Community Meeting    [facility]    Javier Matson RN        Group Therapy Note    Attendees:          Patient's Goal:  \"relaxing\"    Notes:  Pt calm and cooperative during group    Status After Intervention:  Improved    Participation Level:  Active Listener    Participation Quality: Appropriate      Speech:  normal      Thought Process/Content: Logical      Affective Functioning: Flat      Mood: depressed      Level of consciousness:  Alert and Oriented x4      Response to Learning: Able to verbalize current knowledge/experience, Able to verbalize/acknowledge new learning, Able to retain information and Capable of insight      Endings: None Reported    Modes of Intervention: Education and Support      Discipline Responsible: Registered Nurse      Signature:  Ed Brown RN

## 2025-06-24 ENCOUNTER — HOSPITAL ENCOUNTER (OUTPATIENT)
Dept: WOMENS IMAGING | Age: 60
Discharge: HOME | End: 2025-06-24
Attending: INTERNAL MEDICINE
Payer: MEDICARE

## 2025-06-24 DIAGNOSIS — Z12.31 ENCOUNTER FOR SCREENING MAMMOGRAM FOR MALIGNANT NEOPLASM OF BREAST: ICD-10-CM

## 2025-06-24 PROCEDURE — 77067 SCR MAMMO BI INCL CAD: CPT

## 2025-07-29 ENCOUNTER — HOSPITAL ENCOUNTER (OUTPATIENT)
Dept: PHYSICAL THERAPY | Age: 60
Setting detail: THERAPIES SERIES
Discharge: HOME | End: 2025-07-29
Payer: MEDICARE

## 2025-07-29 PROCEDURE — 97162 PT EVAL MOD COMPLEX 30 MIN: CPT

## 2025-07-29 NOTE — PROGRESS NOTES
stand independently using hands  2. Standing Unsupported: Able to stand safely for 2 minutes  3. Sitting with Back Unsupported but Feet Supported on Floor or on a Stool: Able to sit safely and securely for 2 minutes  4. Standing to Sitting: Sits safely with minimal use of hands  5.  Transfers: Able to transfer safely definite need of hands  6. Standing Unsupported with Eyes Closed: Able to stand 10 seconds with supervision  7. Standing Unsupported with Feet Together: Able to place feet together independently but unable to hold for 30 seconds  8. Reach Forward with Outstretched Arm While Standing: Can reach forward 5 cm (2 inches)  9.  Object from Floor from a Standing Position: Unable to  but reaches 2-5 cm (1-2 inches) from slipper and keeps balance independently  10. Turning to Look Behind Over Left and Right Shoulders While Standing: Looks behind from both sides and weight shifts well  11. Turn 360 Degrees: Needs close supervision or verbal cueing  12. Place Alternate Foot on Step or Stool While Standing Unsupported: Able to stand independently and complete 8 steps in greater than 20 seconds  13. Standing Unsupported One Foot in Front: Able to take small step independently and hold 30 seconds  14. Standing on One Leg: Able to lift leg independently and hold 5-10 seconds  Luis Balance Score: 40     Current Score: 40 / 56 (Date: 7/29/2025)    Interpretation of Score: Each section is scored on a 0-4 scale, 0 representing the patient's inability to perform the task and 4 representing independence.  The scores of each section are added together for a total score of 56.  The higher the patient's score, the more independent the patient.  Any score below 45 indicates increased risk for falls. Low risk for falls (41-46), medium risk for falls (21-40), and high risk for falls (0-20).    5 Times Sit to Stand   Current Score: 0 seconds (Date: 7/29/2025)    Interpretation of Score: The 5 Times Sit to Stand Test 
Self

## 2025-08-01 ENCOUNTER — HOSPITAL ENCOUNTER (OUTPATIENT)
Dept: PHYSICAL THERAPY | Age: 60
Setting detail: THERAPIES SERIES
Discharge: HOME | End: 2025-08-01
Payer: MEDICARE

## 2025-08-01 PROCEDURE — 97110 THERAPEUTIC EXERCISES: CPT

## 2025-08-01 ASSESSMENT — PAIN SCALES - GENERAL: PAINLEVEL_OUTOF10: 6

## 2025-08-01 ASSESSMENT — PAIN DESCRIPTION - ORIENTATION: ORIENTATION: RIGHT;LEFT

## 2025-08-01 ASSESSMENT — PAIN DESCRIPTION - DESCRIPTORS: DESCRIPTORS: TIGHTNESS

## 2025-08-01 ASSESSMENT — PAIN DESCRIPTION - LOCATION: LOCATION: FOOT;LEG

## 2025-08-01 NOTE — PROGRESS NOTES
Physical Therapy  Daily Treatment Note  Date: 2025  Patient Name: Nitza Zamora  MRN: [id number]     :   1965    Subjective:   General  Additional Pertinent Hx: 59 y/o F presents with complaint of balance trouble.  PMH includes CVA x 2, pt reported MI, pacemaker placement, hx L shoulder GEORGINA, Bipolar d/o, schizoid personality d/o  PT Visit Information  PT Insurance Information: [employer] (Primary) and [employer] (Secondary)  Total # of Visits Approved: 7  Total # of Visits to Date: 2  Plan of Care/Certification Expiration Date: 25  Progress Note Due Date: 25  Referring Provider (secondary): Darius Suarez DO  Subjective: Just my feet and legs are bothering me    Pain Screening  Patient Currently in Pain: Yes  Pain Assessment: 0-10  Pain Level: 6  Pain Location: Foot;Leg  Pain Orientation: Right;Left  Pain Descriptors: Tightness     Treatment Activities:    Exercises  Exercise 1: LBE for warm-up  level 1 5 min  Exercise 2: LAQ with 1 pound ankle weight  x 10  Exercise 3: HS Curls red t-tand x 10  Exercise 4: Sitting march with 1 pound ankle weight x 10  Exercise 5: Sitting hip abd red t-band  x 10  Exercise 6: Sitting heel/toe raise  x 10  Exercise 7: Repeated sit to stand w/o UE use (start from mat, progress to chair)  x 10  Exercise 8: Mini squats  x 10  Exercise 9: Standing hip abd/ext  with 1 pound ankle weight  x 10  Exercise 10: Standing march with 1 pound ankle weight x 10  Exercise 11: Stance EO/EC/Narrow SUMA (add foam as appropriate)  x 30 sec each  Exercise 12: Tandem stance   x 30 sec each  Exercise 13: SLS  x 20 sec ue support  Exercise 14: Fwd/back ambulation in  bars  x 3  Exercise 15: Sidestepping in  bars  x 3  Exercise 16: Stepping over curbs in  bars  x 3  Exercise 17: 360 turns  x 3 cw/ccw  Exercise 18: Toe taps on cone in  bars  x 10    Assessment:   Conditions Requiring Skilled Therapeutic Intervention  Body Structures, Functions, Activity Limitations

## 2025-08-04 ENCOUNTER — HOSPITAL ENCOUNTER (OUTPATIENT)
Dept: PHYSICAL THERAPY | Age: 60
Setting detail: THERAPIES SERIES
Discharge: HOME | End: 2025-08-04
Payer: MEDICARE

## 2025-08-04 PROCEDURE — 97110 THERAPEUTIC EXERCISES: CPT

## 2025-08-07 ENCOUNTER — HOSPITAL ENCOUNTER (OUTPATIENT)
Dept: PHYSICAL THERAPY | Age: 60
Setting detail: THERAPIES SERIES
Discharge: HOME | End: 2025-08-07
Payer: MEDICARE

## 2025-08-07 PROCEDURE — 97110 THERAPEUTIC EXERCISES: CPT

## 2025-08-12 ENCOUNTER — HOSPITAL ENCOUNTER (OUTPATIENT)
Dept: PHYSICAL THERAPY | Age: 60
Setting detail: THERAPIES SERIES
Discharge: HOME | End: 2025-08-12
Payer: MEDICARE

## 2025-08-12 PROCEDURE — 97110 THERAPEUTIC EXERCISES: CPT

## 2025-08-14 ENCOUNTER — HOSPITAL ENCOUNTER (OUTPATIENT)
Dept: PHYSICAL THERAPY | Age: 60
Setting detail: THERAPIES SERIES
Discharge: HOME | End: 2025-08-14
Payer: MEDICARE

## 2025-08-14 PROCEDURE — 97110 THERAPEUTIC EXERCISES: CPT

## 2025-08-19 ENCOUNTER — HOSPITAL ENCOUNTER (OUTPATIENT)
Dept: PHYSICAL THERAPY | Age: 60
Setting detail: THERAPIES SERIES
Discharge: HOME | End: 2025-08-19
Payer: MEDICARE

## 2025-08-19 PROCEDURE — 97110 THERAPEUTIC EXERCISES: CPT

## 2025-08-22 ENCOUNTER — APPOINTMENT (OUTPATIENT)
Dept: PHYSICAL THERAPY | Age: 60
End: 2025-08-22
Payer: MEDICARE

## 2025-08-26 ENCOUNTER — APPOINTMENT (OUTPATIENT)
Dept: PHYSICAL THERAPY | Age: 60
End: 2025-08-26
Payer: MEDICARE

## 2025-08-29 ENCOUNTER — HOSPITAL ENCOUNTER (OUTPATIENT)
Dept: PHYSICAL THERAPY | Age: 60
Setting detail: THERAPIES SERIES
End: 2025-08-29
Payer: MEDICARE

## (undated) DEVICE — GLOVE SURG SZ 75 L12IN FNGR THK94MIL TRNSLUC YEL LTX

## (undated) DEVICE — DISCONTINUED NO SUB IDED TG GLOVE SURG SENSICARE ALOE LT LF PF ST GRN SZ 8

## (undated) DEVICE — IMMOBILIZER SLING: Brand: DEROYAL

## (undated) DEVICE — STANDARD HYPODERMIC NEEDLE,POLYPROPYLENE HUB: Brand: MONOJECT

## (undated) DEVICE — AIRLIFE™ NASAL OXYGEN CANNULA CURVED, NONFLARED TIP, WITH 7' FEET (2.1 M) CRUSH RESISTANT TUBING, OVER-THE-EAR STYLE: Brand: AIRLIFE™

## (undated) DEVICE — GLOVE SURG SZ 7 L12IN FNGR THK79MIL GRN LTX FREE

## (undated) DEVICE — HYPODERMIC SAFETY NEEDLE: Brand: MAGELLAN

## (undated) DEVICE — SYRINGE, LUER LOCK, 10ML: Brand: MEDLINE

## (undated) DEVICE — ARM SLING: Brand: DEROYAL

## (undated) DEVICE — GLOVE SURG SZ 75 L12IN FNGR THK87MIL DK GRN LTX FREE ISOLEX